# Patient Record
Sex: FEMALE
[De-identification: names, ages, dates, MRNs, and addresses within clinical notes are randomized per-mention and may not be internally consistent; named-entity substitution may affect disease eponyms.]

---

## 2017-07-15 ENCOUNTER — RX ONLY (OUTPATIENT)
Age: 52
Setting detail: RX ONLY
End: 2017-07-15

## 2018-05-18 RX ORDER — NITROGLYCERIN 0.4 MG/1
0.4 TABLET SUBLINGUAL
COMMUNITY
End: 2018-11-21 | Stop reason: SDUPTHER

## 2018-05-18 RX ORDER — ATORVASTATIN CALCIUM 80 MG/1
80 TABLET, FILM COATED ORAL DAILY
COMMUNITY
Start: 2018-01-15 | End: 2018-05-21 | Stop reason: SDUPTHER

## 2018-05-18 RX ORDER — LISINOPRIL 10 MG/1
10 TABLET ORAL DAILY
COMMUNITY
Start: 2018-01-15 | End: 2018-05-21 | Stop reason: SDUPTHER

## 2018-05-18 RX ORDER — METOPROLOL SUCCINATE 100 MG/1
100 TABLET, EXTENDED RELEASE ORAL DAILY
COMMUNITY
Start: 2018-01-15 | End: 2018-05-21 | Stop reason: SDUPTHER

## 2018-05-18 RX ORDER — METFORMIN HYDROCHLORIDE 750 MG/1
750 TABLET, EXTENDED RELEASE ORAL DAILY
COMMUNITY
End: 2019-05-14

## 2018-05-18 RX ORDER — ASPIRIN 81 MG/1
81 TABLET ORAL DAILY
COMMUNITY
Start: 2017-08-23

## 2018-05-21 ENCOUNTER — OFFICE VISIT (OUTPATIENT)
Dept: CARDIOLOGY | Facility: CLINIC | Age: 53
End: 2018-05-21
Attending: INTERNAL MEDICINE
Payer: COMMERCIAL

## 2018-05-21 VITALS
HEIGHT: 64 IN | BODY MASS INDEX: 39.98 KG/M2 | DIASTOLIC BLOOD PRESSURE: 70 MMHG | WEIGHT: 234.2 LBS | SYSTOLIC BLOOD PRESSURE: 110 MMHG | HEART RATE: 83 BPM

## 2018-05-21 DIAGNOSIS — E66.9 OBESITY WITH SERIOUS COMORBIDITY, UNSPECIFIED CLASSIFICATION, UNSPECIFIED OBESITY TYPE: ICD-10-CM

## 2018-05-21 DIAGNOSIS — R94.31 ABNORMAL EKG: ICD-10-CM

## 2018-05-21 DIAGNOSIS — I73.9 CLAUDICATION (CMS/HCC): ICD-10-CM

## 2018-05-21 DIAGNOSIS — E78.5 ELEVATED LIPIDS: ICD-10-CM

## 2018-05-21 DIAGNOSIS — Z72.0 TOBACCO USE: ICD-10-CM

## 2018-05-21 DIAGNOSIS — I21.4 NON-ST ELEVATION (NSTEMI) MYOCARDIAL INFARCTION (CMS/HCC): ICD-10-CM

## 2018-05-21 DIAGNOSIS — I73.9 CLAUDICATION IN PERIPHERAL VASCULAR DISEASE (CMS/HCC): Primary | ICD-10-CM

## 2018-05-21 DIAGNOSIS — I73.9 PERIPHERAL VASCULAR DISEASE (CMS/HCC): ICD-10-CM

## 2018-05-21 PROBLEM — I10 ESSENTIAL HYPERTENSION: Status: ACTIVE | Noted: 2018-05-21

## 2018-05-21 PROCEDURE — 93000 ELECTROCARDIOGRAM COMPLETE: CPT | Performed by: INTERNAL MEDICINE

## 2018-05-21 PROCEDURE — 99214 OFFICE O/P EST MOD 30 MIN: CPT | Performed by: INTERNAL MEDICINE

## 2018-05-21 RX ORDER — METOPROLOL SUCCINATE 100 MG/1
100 TABLET, EXTENDED RELEASE ORAL DAILY
Qty: 30 TABLET | Refills: 5 | Status: SHIPPED | OUTPATIENT
Start: 2018-05-21 | End: 2019-04-24 | Stop reason: SDUPTHER

## 2018-05-21 RX ORDER — LISINOPRIL 10 MG/1
10 TABLET ORAL DAILY
Qty: 30 TABLET | Refills: 5 | Status: SHIPPED | OUTPATIENT
Start: 2018-05-21 | End: 2019-04-24 | Stop reason: SDUPTHER

## 2018-05-21 RX ORDER — ATORVASTATIN CALCIUM 80 MG/1
80 TABLET, FILM COATED ORAL DAILY
Qty: 30 TABLET | Refills: 5 | Status: SHIPPED | OUTPATIENT
Start: 2018-05-21 | End: 2019-04-24 | Stop reason: SDUPTHER

## 2018-05-21 ASSESSMENT — ENCOUNTER SYMPTOMS
WHEEZING: 0
HEMOPTYSIS: 0
MEMORY LOSS: 0
CLAUDICATION: 0
SHORTNESS OF BREATH: 0
BRUISES/BLEEDS EASILY: 0
WEIGHT GAIN: 1
SYNCOPE: 0
IRREGULAR HEARTBEAT: 0
NEAR-SYNCOPE: 0
FLANK PAIN: 0
EXCESSIVE DAYTIME SLEEPINESS: 0
DYSPNEA ON EXERTION: 0
LIGHT-HEADEDNESS: 0
HEMATEMESIS: 0
SNORING: 0
UNUSUAL HAIR DISTRIBUTION: 0
FOCAL WEAKNESS: 0
FEVER: 0
JAUNDICE: 0
COLOR CHANGE: 0
BACK PAIN: 0
FALLS: 0
HEADACHES: 0
PALPITATIONS: 0
CHILLS: 0
DIZZINESS: 0
PND: 0
SPUTUM PRODUCTION: 0
ORTHOPNEA: 0
NERVOUS/ANXIOUS: 0
HEMATURIA: 0
MYALGIAS: 0

## 2018-05-21 NOTE — LETTER
May 21, 2018     Semaj Reina  [address] [address]    Patient: Mirela Lopez   YOB: 1965   Date of Visit: 5/21/2018       Dear Dr. Reina:    Thank you for referring Mirela Lopez to me for evaluation. Below are my notes for this consultation.    If you have questions, please do not hesitate to call me. I look forward to following your patient along with you.         Sincerely,        Zara Mccloud MD        CC: MD Zara Naik MD  5/21/2018  1:37 PM  Signed  HPI   Mirela comes to the office today for follow-up of her coronary artery disease status post non-ST segment elevation myocardial infarction while on vacation in [place] in August 2017.  She has a history of diabetes, hyperlipidemia and obesity.  During her cardiac catheterization, she was also found to have a right iliac occlusion, but in follow-up visit she describes symptoms of iliac claudication.  She had an ultrasound performed that showed a small amount of flow and the subsequent CTA showed short occlusion with reconstitution.  Mirela was referred to vascular surgery and underwent successful percutaneous intervention of the right iliac with Dr. Sandip Ugalde at [facility].  She denies any claudication symptoms.  From a cardiac perspective, she feels well.  She has not had any recurrent symptoms indicative of her angina.  She is exercising several days a week and denies any chest discomfort or unusual shortness of breath with exercise.  She has not smoked since her heart attack.  We reviewed all of her noninvasive studies up to this point.  In February of this year she had PVRs done at [facility] that were abnormal on the left side, although a previous angiogram did not show any significant disease on this side.  She denies any left-sided claudication symptoms.     Medical History:  Past Medical History:   Diagnosis Date   • Coronary artery disease    • GERD (gastroesophageal reflux  disease)    • Hyperlipidemia    • Impaired fasting glucose    • Myocardial infarction     NSTEMI with stent left circumflex   • Obesity    • Peripheral vascular disease (CMS/HCC) (HCC) 2017    Right iliac claudication.  Aortogram demonstrated near occlusion of the right common iliac.  Successful right iliac stent 2017     Surgical History:  Past Surgical History:   Procedure Laterality Date   • CARDIAC CATHETERIZATION      LAD tortuous, no significant disease.  Dominant RCA-patent.  90% proximal cx, 95% mid cx and distal thrombus   • CARPAL TUNNEL RELEASE     •  SECTION     • CORONARY STENT PLACEMENT      NERI ×2 left circumflex-performed in [place]   • ILIAC ARTERY STENT  10/2017    Dr. Ugalde     Social History: reports that she quit smoking about 9 months ago. She has never used smokeless tobacco. She reports that she drinks alcohol.    Family History:  Family History   Problem Relation Age of Onset   • Lung cancer Mother    • Heart disease Father    • Hyperlipidemia Father    • Diabetes type II Maternal Grandmother         Allergies:Adhesive and Adhesive tape-silicones    Current Medications:  Current Outpatient Prescriptions:   •  aspirin 81 mg enteric coated tablet, Take 81 mg by mouth daily., Disp: , Rfl:   •  atorvastatin (LIPITOR) 80 mg tablet, Take 1 tablet (80 mg total) by mouth daily., Disp: 30 tablet, Rfl: 5  •  ferrous sulfate 325 mg (65 mg iron) tablet, Take 1 tablet by mouth 3 (three) times a day., Disp: , Rfl: 6  •  lisinopril (PRINIVIL) 10 mg tablet, Take 1 tablet (10 mg total) by mouth daily., Disp: 30 tablet, Rfl: 5  •  metFORMIN XR (GLUCOPHAGE-XR) 750 mg 24 hr tablet, Take 750 mg by mouth daily. With evening meal., Disp: , Rfl:   •  metoprolol succinate XL (TOPROL-XL) 100 mg 24 hr tablet, Take 1 tablet (100 mg total) by mouth daily., Disp: 30 tablet, Rfl: 5  •  nitroglycerin (NITROSTAT) 0.4 mg SL tablet, Place 0.4 mg under the tongue. place 1 tablet by sublingual  "route at the 1st sign of attack; may repeat every 5 min until relief; if pain persists after 3 tablets in 15 min, prompt medical attention is recommended, Disp: , Rfl:   •  ticagrelor (BRILINTA) 90 mg tablet, Take 1 tablet (90 mg total) by mouth 2 (two) times a day., Disp: 60 tablet, Rfl: 5     Review of Systems   Constitution: Positive for weight gain. Negative for chills and fever.   HENT: Negative for nosebleeds.    Eyes: Negative for visual disturbance.   Cardiovascular: Negative for chest pain, claudication, dyspnea on exertion, irregular heartbeat, leg swelling, near-syncope, orthopnea, palpitations, paroxysmal nocturnal dyspnea and syncope.   Respiratory: Negative for hemoptysis, shortness of breath, snoring, sputum production and wheezing.    Endocrine: Negative for heat intolerance.   Hematologic/Lymphatic: Negative for bleeding problem. Does not bruise/bleed easily.   Skin: Negative for color change and unusual hair distribution.   Musculoskeletal: Negative for back pain, falls and myalgias.   Gastrointestinal: Negative for hematemesis, jaundice and melena.   Genitourinary: Negative for flank pain and hematuria.   Neurological: Negative for excessive daytime sleepiness, dizziness, focal weakness, headaches and light-headedness.   Psychiatric/Behavioral: Negative for memory loss. The patient is not nervous/anxious.    Allergic/Immunologic: Negative for environmental allergies.     Vitals:    05/21/18 1114   BP: 110/70   BP Location: Right upper arm   Patient Position: Sitting   Pulse: 83   Weight: 106 kg (234 lb 3.2 oz)   Height: 1.626 m (5' 4\")     BP Readings from Last 3 Encounters:   05/21/18 110/70     Wt Readings from Last 3 Encounters:   05/21/18 106 kg (234 lb 3.2 oz)     Physical Exam   Constitutional: She is oriented to person, place, and time. She appears well-developed. She is cooperative.   Obese white female in no apparent distress   HENT:   Head: Normocephalic and atraumatic.   Mouth/Throat: " Oropharynx is clear and moist and mucous membranes are normal.   Eyes: Conjunctivae and EOM are normal. No scleral icterus.   Neck: Trachea normal and normal range of motion. Neck supple. No hepatojugular reflux and no JVD present. Carotid bruit is not present. No thyromegaly present.   Cardiovascular: Normal rate, regular rhythm, S1 normal, S2 normal, normal heart sounds and intact distal pulses.  Exam reveals no gallop, no S3, no S4 and no friction rub.    Pulses:       Radial pulses are 2+ on the right side, and 2+ on the left side.        Posterior tibial pulses are 1+ on the right side, and 1+ on the left side.   There is a 1/6 systolic murmur at the left sternal border.   Pulmonary/Chest: Effort normal and breath sounds normal. She has no wheezes. She has no rhonchi. She has no rales.   Abdominal: Soft. Bowel sounds are normal. She exhibits no abdominal bruit. There is no tenderness.   Obese abdomen   Musculoskeletal: Normal range of motion. She exhibits no tenderness or deformity.   Neurological: She is alert and oriented to person, place, and time. No cranial nerve deficit.   Skin: Skin is warm, dry and intact. No rash noted. No cyanosis. Nails show no clubbing.   Psychiatric: She has a normal mood and affect. Her speech is normal and behavior is normal.          Lab Results   Component Value Date    WBC 8.59 10/27/2017    HGB 8.2 (L) 10/27/2017     10/27/2017     10/27/2017    K 3.7 10/27/2017    CREATININE 0.6 10/27/2017    INR 1.1 10/27/2017       Imaging:  CATH LAB:  Cath (LAD tortuous, no significant disease. RCA dominant and patent. Left circumflex with 90% proximal lesion, 95% mid lesion and distal thrombus. Successful NERI ×2 left circumflex.) - 8/13/2017    ECHO/MUGA:  Echo (EF 0.65 (65%), Top normal LV size with overall preserved function. No significant pulmonary hypertension by Doppler. Technically difficult study.) - 9/2017    VASCULAR:  Aortogram w/Runoff (Occluded right common  iliac at origin with collaterals. Patent left common iliac and common femoral.) - 8/13/2017    Abd Ao Duplex (Elevated velocities right common iliac with monophasic flow.) - 9/2017    Periph Intervention (Successful right iliac stent.) - 10/2017    ECG: Sinus Rhythm, RSR(V1) -nondiagnostic.  Mild NSST-T abnl       Diagnoses and all orders for this visit:    Claudication in peripheral vascular disease (CMS/HCC) ([facility])  We reviewed her prior noninvasive studies, angiogram and PVRs.  She has no claudication symptoms in her left leg.  Dr. Del Rio will be following her closely.  If the patient does start to notice any left leg symptoms, she should let us know right away.  In the meantime, she will continue with antiplatelet therapy and statin therapy.  LDL goal less than 70 mg/dL.  She just had lab work done and I am awaiting copies of this.    Non-ST elevation (NSTEMI) myocardial infarction (CMS/HCC) ([facility])  She remains on aspirin 81 mg daily and Brilinta 90 mg twice daily.  At the end of August, she may discontinue her Brilinta and resume single antiplatelet therapy.  Continue to abstain from tobacco.  LDL goal less than 70 mg/dL.  Unfortunately, she has gained weight since her last visit and needs to continue efforts with diet and weight loss.    Tobacco use  Continue to abstain from tobacco.  This was reinforced with patient today.    Elevated lipids  Continue atorvastatin with LDL goal less than 70 mg/dL.  Awaiting updated blood work.    Abnormal EKG  EKG is abnormal but unchanged.  She denies any anginal symptoms.  She states that her exercise capacity is good with no changes recently.  Continue regular physical activity.  Continue aspirin 81 mg daily, Toprol- mg once daily and lisinopril.    Obesity with serious comorbidity, unspecified classification, unspecified obesity type  Continue with weight loss and regular physical activity.  BMI is 40.    Essential hypertension  Controlled on lisinopril 10 mg daily  and Toprol- mg daily.  Continue efforts with weight loss, exercise and dietary discretion.    Other orders-medications renewed today  -     atorvastatin (LIPITOR) 80 mg tablet; Take 1 tablet (80 mg total) by mouth daily.  -     lisinopril (PRINIVIL) 10 mg tablet; Take 1 tablet (10 mg total) by mouth daily.  -     metoprolol succinate XL (TOPROL-XL) 100 mg 24 hr tablet; Take 1 tablet (100 mg total) by mouth daily.  -     ticagrelor (BRILINTA) 90 mg tablet; Take 1 tablet (90 mg total) by mouth 2 (two) times a day.    Return in about 6 months (around 11/21/2018).  She is scheduled for follow-up with Dr. Ugalde over the summer.  I gave her written and verbal instructions that she may discontinue her Brilinta at the end of August.  If she has any issues that arise over the next few months, she will contact the office.  Thank you for allowing me to participate in the care of this patient.  I hope this information is helpful.    Zara Mccloud MD [facility]  5/21/2018  1:22 PM

## 2018-05-21 NOTE — PROGRESS NOTES
EVELIN Dietz comes to the office today for follow-up of her coronary artery disease status post non-ST segment elevation myocardial infarction while on vacation in [place] in August 2017.  She has a history of diabetes, hyperlipidemia and obesity.  During her cardiac catheterization, she was also found to have a right iliac occlusion, but in follow-up visit she describes symptoms of iliac claudication.  She had an ultrasound performed that showed a small amount of flow and the subsequent CTA showed short occlusion with reconstitution.  Mirela was referred to vascular surgery and underwent successful percutaneous intervention of the right iliac with Dr. Sandip Ugalde at [facility].  She denies any claudication symptoms.  From a cardiac perspective, she feels well.  She has not had any recurrent symptoms indicative of her angina.  She is exercising several days a week and denies any chest discomfort or unusual shortness of breath with exercise.  She has not smoked since her heart attack.  We reviewed all of her noninvasive studies up to this point.  In February of this year she had PVRs done at [facility] that were abnormal on the left side, although a previous angiogram did not show any significant disease on this side.  She denies any left-sided claudication symptoms.     Medical History:  Past Medical History:   Diagnosis Date   • Coronary artery disease    • GERD (gastroesophageal reflux disease)    • Hyperlipidemia    • Impaired fasting glucose    • Myocardial infarction 2017    NSTEMI with stent left circumflex   • Obesity    • Peripheral vascular disease (CMS/HCC) ([facility]) 2017    Right iliac claudication.  Aortogram demonstrated near occlusion of the right common iliac.  Successful right iliac stent October 2017     Surgical History:  Past Surgical History:   Procedure Laterality Date   • CARDIAC CATHETERIZATION  2017    LAD tortuous, no significant disease.  Dominant RCA-patent.  90% proximal cx, 95% mid cx and  distal thrombus   • CARPAL TUNNEL RELEASE     •  SECTION     • CORONARY STENT PLACEMENT  2017    NERI ×2 left circumflex-performed in [place]   • ILIAC ARTERY STENT  10/2017    Dr. Ugalde     Social History: reports that she quit smoking about 9 months ago. She has never used smokeless tobacco. She reports that she drinks alcohol.    Family History:  Family History   Problem Relation Age of Onset   • Lung cancer Mother    • Heart disease Father    • Hyperlipidemia Father    • Diabetes type II Maternal Grandmother         Allergies:Adhesive and Adhesive tape-silicones    Current Medications:  Current Outpatient Prescriptions:   •  aspirin 81 mg enteric coated tablet, Take 81 mg by mouth daily., Disp: , Rfl:   •  atorvastatin (LIPITOR) 80 mg tablet, Take 1 tablet (80 mg total) by mouth daily., Disp: 30 tablet, Rfl: 5  •  ferrous sulfate 325 mg (65 mg iron) tablet, Take 1 tablet by mouth 3 (three) times a day., Disp: , Rfl: 6  •  lisinopril (PRINIVIL) 10 mg tablet, Take 1 tablet (10 mg total) by mouth daily., Disp: 30 tablet, Rfl: 5  •  metFORMIN XR (GLUCOPHAGE-XR) 750 mg 24 hr tablet, Take 750 mg by mouth daily. With evening meal., Disp: , Rfl:   •  metoprolol succinate XL (TOPROL-XL) 100 mg 24 hr tablet, Take 1 tablet (100 mg total) by mouth daily., Disp: 30 tablet, Rfl: 5  •  nitroglycerin (NITROSTAT) 0.4 mg SL tablet, Place 0.4 mg under the tongue. place 1 tablet by sublingual route at the 1st sign of attack; may repeat every 5 min until relief; if pain persists after 3 tablets in 15 min, prompt medical attention is recommended, Disp: , Rfl:   •  ticagrelor (BRILINTA) 90 mg tablet, Take 1 tablet (90 mg total) by mouth 2 (two) times a day., Disp: 60 tablet, Rfl: 5     Review of Systems   Constitution: Positive for weight gain. Negative for chills and fever.   HENT: Negative for nosebleeds.    Eyes: Negative for visual disturbance.   Cardiovascular: Negative for chest pain, claudication, dyspnea on  "exertion, irregular heartbeat, leg swelling, near-syncope, orthopnea, palpitations, paroxysmal nocturnal dyspnea and syncope.   Respiratory: Negative for hemoptysis, shortness of breath, snoring, sputum production and wheezing.    Endocrine: Negative for heat intolerance.   Hematologic/Lymphatic: Negative for bleeding problem. Does not bruise/bleed easily.   Skin: Negative for color change and unusual hair distribution.   Musculoskeletal: Negative for back pain, falls and myalgias.   Gastrointestinal: Negative for hematemesis, jaundice and melena.   Genitourinary: Negative for flank pain and hematuria.   Neurological: Negative for excessive daytime sleepiness, dizziness, focal weakness, headaches and light-headedness.   Psychiatric/Behavioral: Negative for memory loss. The patient is not nervous/anxious.    Allergic/Immunologic: Negative for environmental allergies.     Vitals:    05/21/18 1114   BP: 110/70   BP Location: Right upper arm   Patient Position: Sitting   Pulse: 83   Weight: 106 kg (234 lb 3.2 oz)   Height: 1.626 m (5' 4\")     BP Readings from Last 3 Encounters:   05/21/18 110/70     Wt Readings from Last 3 Encounters:   05/21/18 106 kg (234 lb 3.2 oz)     Physical Exam   Constitutional: She is oriented to person, place, and time. She appears well-developed. She is cooperative.   Obese white female in no apparent distress   HENT:   Head: Normocephalic and atraumatic.   Mouth/Throat: Oropharynx is clear and moist and mucous membranes are normal.   Eyes: Conjunctivae and EOM are normal. No scleral icterus.   Neck: Trachea normal and normal range of motion. Neck supple. No hepatojugular reflux and no JVD present. Carotid bruit is not present. No thyromegaly present.   Cardiovascular: Normal rate, regular rhythm, S1 normal, S2 normal, normal heart sounds and intact distal pulses.  Exam reveals no gallop, no S3, no S4 and no friction rub.    Pulses:       Radial pulses are 2+ on the right side, and 2+ on the " left side.        Posterior tibial pulses are 1+ on the right side, and 1+ on the left side.   There is a 1/6 systolic murmur at the left sternal border.   Pulmonary/Chest: Effort normal and breath sounds normal. She has no wheezes. She has no rhonchi. She has no rales.   Abdominal: Soft. Bowel sounds are normal. She exhibits no abdominal bruit. There is no tenderness.   Obese abdomen   Musculoskeletal: Normal range of motion. She exhibits no tenderness or deformity.   Neurological: She is alert and oriented to person, place, and time. No cranial nerve deficit.   Skin: Skin is warm, dry and intact. No rash noted. No cyanosis. Nails show no clubbing.   Psychiatric: She has a normal mood and affect. Her speech is normal and behavior is normal.          Lab Results   Component Value Date    WBC 8.59 10/27/2017    HGB 8.2 (L) 10/27/2017     10/27/2017     10/27/2017    K 3.7 10/27/2017    CREATININE 0.6 10/27/2017    INR 1.1 10/27/2017       Imaging:  CATH LAB:  Cath (LAD tortuous, no significant disease. RCA dominant and patent. Left circumflex with 90% proximal lesion, 95% mid lesion and distal thrombus. Successful NERI ×2 left circumflex.) - 8/13/2017    ECHO/MUGA:  Echo (EF 0.65 (65%), Top normal LV size with overall preserved function. No significant pulmonary hypertension by Doppler. Technically difficult study.) - 9/2017    VASCULAR:  Aortogram w/Runoff (Occluded right common iliac at origin with collaterals. Patent left common iliac and common femoral.) - 8/13/2017    Abd Ao Duplex (Elevated velocities right common iliac with monophasic flow.) - 9/2017    Periph Intervention (Successful right iliac stent.) - 10/2017    ECG: Sinus Rhythm, RSR(V1) -nondiagnostic.  Mild NSST-T abnl       Diagnoses and all orders for this visit:    Claudication in peripheral vascular disease (CMS/HCC) (HCC)  We reviewed her prior noninvasive studies, angiogram and PVRs.  She has no claudication symptoms in her left leg.   Dr. Del Rio will be following her closely.  If the patient does start to notice any left leg symptoms, she should let us know right away.  In the meantime, she will continue with antiplatelet therapy and statin therapy.  LDL goal less than 70 mg/dL.  She just had lab work done and I am awaiting copies of this.    Non-ST elevation (NSTEMI) myocardial infarction (CMS/HCC) (HCC)  She remains on aspirin 81 mg daily and Brilinta 90 mg twice daily.  At the end of August, she may discontinue her Brilinta and resume single antiplatelet therapy.  Continue to abstain from tobacco.  LDL goal less than 70 mg/dL.  Unfortunately, she has gained weight since her last visit and needs to continue efforts with diet and weight loss.    Tobacco use  Continue to abstain from tobacco.  This was reinforced with patient today.    Elevated lipids  Continue atorvastatin with LDL goal less than 70 mg/dL.  Awaiting updated blood work.    Abnormal EKG  EKG is abnormal but unchanged.  She denies any anginal symptoms.  She states that her exercise capacity is good with no changes recently.  Continue regular physical activity.  Continue aspirin 81 mg daily, Toprol- mg once daily and lisinopril.    Obesity with serious comorbidity, unspecified classification, unspecified obesity type  Continue with weight loss and regular physical activity.  BMI is 40.    Essential hypertension  Controlled on lisinopril 10 mg daily and Toprol- mg daily.  Continue efforts with weight loss, exercise and dietary discretion.    Other orders-medications renewed today  -     atorvastatin (LIPITOR) 80 mg tablet; Take 1 tablet (80 mg total) by mouth daily.  -     lisinopril (PRINIVIL) 10 mg tablet; Take 1 tablet (10 mg total) by mouth daily.  -     metoprolol succinate XL (TOPROL-XL) 100 mg 24 hr tablet; Take 1 tablet (100 mg total) by mouth daily.  -     ticagrelor (BRILINTA) 90 mg tablet; Take 1 tablet (90 mg total) by mouth 2 (two) times a day.    Return in  about 6 months (around 11/21/2018).  She is scheduled for follow-up with Dr. Ugalde over the summer.  I gave her written and verbal instructions that she may discontinue her Brilinta at the end of August.  If she has any issues that arise over the next few months, she will contact the office.  Thank you for allowing me to participate in the care of this patient.  I hope this information is helpful.    Zara Mccloud MD [facility]  5/21/2018  1:22 PM

## 2018-07-31 DIAGNOSIS — I73.9 PAD (PERIPHERAL ARTERY DISEASE) (CMS/HCC): Primary | ICD-10-CM

## 2018-11-21 ENCOUNTER — OFFICE VISIT (OUTPATIENT)
Dept: CARDIOLOGY | Facility: CLINIC | Age: 53
End: 2018-11-21
Payer: COMMERCIAL

## 2018-11-21 VITALS
DIASTOLIC BLOOD PRESSURE: 66 MMHG | RESPIRATION RATE: 16 BRPM | HEART RATE: 80 BPM | HEIGHT: 64 IN | WEIGHT: 238.6 LBS | BODY MASS INDEX: 40.74 KG/M2 | SYSTOLIC BLOOD PRESSURE: 110 MMHG

## 2018-11-21 DIAGNOSIS — I10 ESSENTIAL HYPERTENSION: Primary | ICD-10-CM

## 2018-11-21 DIAGNOSIS — I21.4 NON-ST ELEVATION (NSTEMI) MYOCARDIAL INFARCTION (CMS/HCC): ICD-10-CM

## 2018-11-21 DIAGNOSIS — R94.31 ABNORMAL EKG: ICD-10-CM

## 2018-11-21 DIAGNOSIS — I73.9 CLAUDICATION (CMS/HCC): ICD-10-CM

## 2018-11-21 DIAGNOSIS — Z72.0 TOBACCO USE: ICD-10-CM

## 2018-11-21 DIAGNOSIS — E78.5 ELEVATED LIPIDS: ICD-10-CM

## 2018-11-21 PROCEDURE — 93000 ELECTROCARDIOGRAM COMPLETE: CPT | Performed by: INTERNAL MEDICINE

## 2018-11-21 PROCEDURE — 99214 OFFICE O/P EST MOD 30 MIN: CPT | Performed by: INTERNAL MEDICINE

## 2018-11-21 RX ORDER — NITROGLYCERIN 0.4 MG/1
0.4 TABLET SUBLINGUAL EVERY 5 MIN PRN
Qty: 25 TABLET | Refills: 3 | Status: SHIPPED | OUTPATIENT
Start: 2018-11-21 | End: 2023-11-13 | Stop reason: SDUPTHER

## 2018-11-21 ASSESSMENT — ENCOUNTER SYMPTOMS
JAUNDICE: 0
SPUTUM PRODUCTION: 0
MEMORY LOSS: 0
FALLS: 0
SHORTNESS OF BREATH: 0
WHEEZING: 0
FEVER: 0
BRUISES/BLEEDS EASILY: 0
HEMATEMESIS: 0
FOCAL WEAKNESS: 0
FLANK PAIN: 0
NEAR-SYNCOPE: 0
NERVOUS/ANXIOUS: 0
ORTHOPNEA: 0
MYALGIAS: 0
PALPITATIONS: 0
HEMATURIA: 0
PND: 0
HEADACHES: 0
EXCESSIVE DAYTIME SLEEPINESS: 0
SNORING: 0
COLOR CHANGE: 0
BACK PAIN: 0
DYSPNEA ON EXERTION: 0
UNUSUAL HAIR DISTRIBUTION: 0
DIZZINESS: 0
LIGHT-HEADEDNESS: 0
IRREGULAR HEARTBEAT: 0
SYNCOPE: 0
CHILLS: 0
CLAUDICATION: 0
WEIGHT GAIN: 1
HEMOPTYSIS: 0

## 2018-11-21 NOTE — LETTER
November 21, 2018     Semaj Reina  [address] [address]    Patient: Mirlea Lopez   YOB: 1965   Date of Visit: 11/21/2018       Dear Dr. Reina:    Thank you for referring Mirela Lopez to me for evaluation. Below are my notes for this consultation.    If you have questions, please do not hesitate to call me. I look forward to following your patient along with you.         Sincerely,        Zara Mccloud MD        CC: MD Oneyda Naik Erin, MD  11/21/2018  9:55 AM  Signed  Patient ID: Mirela Lopez is a 53 y.o. female. 1965  HPI   Mirela comes to the office to day for follow-up regarding her coronary artery disease status post non-ST segment elevation myocardial infarction while in [place] in August 2017.  Cardiac catheterization at that time showed a 95% mid and distal circumflex with thrombus.  There is also a 90% proximal circumflex lesion and she underwent successful NERI placement x2 with that time.  She has completed 1 year of dual antiplatelet therapy with aspirin and Brilinta and is now on aspirin alone.  She was also found to have right iliac claudication and underwent successful right iliac stent in October 2017.  She says she just received notification that she needs to follow-up with Dr. Ugalde.    I am happy to report that she is not smoking.  She has been exercising 3 times a week, but not as much recently because of a vacation and the fact that she was working a second job.  Mirela denies any chest discomfort or anginal symptoms reminiscent of her original event.  She denies any claudication.  She is compliant with her medications.  She has not used any sublingual nitroglycerin.       Past Medical History:   Diagnosis Date   • Coronary artery disease    • GERD (gastroesophageal reflux disease)    • Hyperlipidemia    • Hypertension    • Impaired fasting glucose    • Myocardial infarction (CMS/[facility]) (HCC) 2017    NSTEMI with stent  left circumflex   • Obesity    • Peripheral vascular disease (CMS/HCC) ([facility]) 2017    Right iliac claudication.  Aortogram demonstrated near occlusion of the right common iliac.  Successful right iliac stent 2017     Past Surgical History:   Procedure Laterality Date   • CARDIAC CATHETERIZATION      LAD tortuous, no significant disease.  Dominant RCA-patent.  90% proximal cx, 95% mid cx and distal thrombus   • CARPAL TUNNEL RELEASE     •  SECTION     • CORONARY STENT PLACEMENT      NERI ×2 left circumflex-performed in [place]   • ILIAC ARTERY STENT  10/2017    Dr. Ugalde     Social History   Substance Use Topics   • Smoking status: Former Smoker     Quit date: 2017   • Smokeless tobacco: Never Used   • Alcohol use Yes      Comment: Occasional social alcohol     Family History   Problem Relation Age of Onset   • Lung cancer Mother    • Heart disease Father    • Hyperlipidemia Father    • Diabetes type II Maternal Grandmother         Allergies:Adhesive and Adhesive tape-silicones    Current Outpatient Prescriptions:   •  aspirin 81 mg enteric coated tablet, Take 81 mg by mouth daily.  •  atorvastatin (LIPITOR) 80 mg tablet, Take 1 tablet (80 mg total) by mouth daily.,   •  ferrous sulfate 325 mg (65 mg iron) tablet, Take 1 tablet by mouth 3 (three) times a day.,   •  lisinopril (PRINIVIL) 10 mg tablet, Take 1 tablet (10 mg total) by mouth daily., Disp:   •  metFORMIN XR (GLUCOPHAGE-XR) 750 mg 24 hr tablet, Take 750 mg by mouth daily. With evening meal.  •  metoprolol succinate XL (TOPROL-XL) 100 mg 24 hr tablet, Take 1 tablet (100 mg total) by mouth daily.,  •  nitroglycerin (NITROSTAT) 0.4 mg SL tablet, Place 0.4 mg under the tongue. place 1 tablet by sublingual route at the 1st sign of attack; may repeat every 5 min until relief; if pain persists after 3 tablets in 15 min, prompt medical attention is recommended,      Review of Systems   Constitution: Positive for weight gain. Negative  "for chills and fever.   HENT: Negative for nosebleeds.    Eyes: Negative for visual disturbance.   Cardiovascular: Negative for chest pain, claudication, dyspnea on exertion, irregular heartbeat, leg swelling, near-syncope, orthopnea, palpitations, paroxysmal nocturnal dyspnea and syncope.   Respiratory: Negative for hemoptysis, shortness of breath, snoring, sputum production and wheezing.    Endocrine: Negative for heat intolerance.   Hematologic/Lymphatic: Negative for bleeding problem. Does not bruise/bleed easily.   Skin: Negative for color change and unusual hair distribution.   Musculoskeletal: Negative for back pain, falls and myalgias.   Gastrointestinal: Negative for hematemesis, jaundice and melena.   Genitourinary: Negative for flank pain and hematuria.   Neurological: Negative for excessive daytime sleepiness, dizziness, focal weakness, headaches and light-headedness.   Psychiatric/Behavioral: Negative for memory loss. The patient is not nervous/anxious.    Allergic/Immunologic: Negative for environmental allergies.     Vitals:    11/21/18 0926   BP: 110/66 millimeters mercury, repeat blood pressure 126/76 mmHg   BP Location: Left upper arm   Patient Position: Sitting   Pulse: 80   Resp: 16   Weight: 108 kg (238 lb 9.6 oz)   Height: 1.626 m (5' 4\")     BP Readings from Last 3 Encounters:   11/21/18 110/66   05/21/18 110/70     Wt Readings from Last 3 Encounters:   11/21/18 108 kg (238 lb 9.6 oz)   05/21/18 106 kg (234 lb 3.2 oz)     Physical Exam   Constitutional: She is oriented to person, place, and time. She appears well-developed. She is cooperative.   Obese white female in no apparent distress   HENT:   Head: Normocephalic and atraumatic.   Mouth/Throat: Oropharynx is clear and moist and mucous membranes are normal.   Eyes: Conjunctivae and EOM are normal. No scleral icterus.   Neck: Trachea normal and normal range of motion. Neck supple. No hepatojugular reflux and no JVD present. Carotid bruit is " not present. No thyromegaly present.   Cardiovascular: Normal rate, regular rhythm, S1 normal, S2 normal and normal heart sounds.  Exam reveals decreased pulses. Exam reveals no gallop, no S3, no S4 and no friction rub.    Pulses:       Radial pulses are 2+ on the right side, and 2+ on the left side.        Posterior tibial pulses are 1+ on the right side, and 1+ on the left side.   There is a 1/6 systolic murmur at the left sternal border.   Pulmonary/Chest: Effort normal and breath sounds normal. She has no wheezes. She has no rhonchi. She has no rales.   Abdominal: Soft. Bowel sounds are normal. She exhibits no abdominal bruit. There is no tenderness.   Obese abdomen   Musculoskeletal: Normal range of motion. She exhibits no tenderness or deformity.   Neurological: She is alert and oriented to person, place, and time. No cranial nerve deficit.   Skin: Skin is warm, dry and intact. No rash noted. No cyanosis. Nails show no clubbing.   Psychiatric: She has a normal mood and affect. Her speech is normal and behavior is normal.          Lab Results   Component Value Date    WBC 8.59 10/27/2017    HGB 8.2 (L) 10/27/2017    HCT 24.0 (L) 10/27/2017    MCV 89.2 10/27/2017     10/27/2017     Lab Results   Component Value Date    GLUCOSE 157 (H) 10/27/2017     10/27/2017    K 3.7 10/27/2017    CO2 23 10/27/2017     10/27/2017    BUN 6 (L) 10/27/2017    CREATININE 0.6 10/27/2017     Imaging:  CATH LAB:  Cath (LAD tortuous, no significant disease. RCA dominant and patent. Left circumflex with 90% proximal lesion, 95% mid lesion and distal thrombus. Successful NERI ×2 left circumflex.) - 8/13/2017     ECHO/MUGA:  Echo (EF 0.65 (65%), Top normal LV size with overall preserved function. No significant pulmonary hypertension by Doppler. Technically difficult study.) - 9/2017     VASCULAR:  Aortogram w/Runoff (Occluded right common iliac at origin with collaterals. Patent left common iliac and common femoral.) -  "2017     Abd Ao Duplex (Elevated velocities right common iliac with monophasic flow.) - 2017     Periph Intervention (Successful right iliac stent.) - 10/2017    EK. Essential hypertension  Mirela has well-controlled blood pressure at the present time on lisinopril 10 mg daily and Toprol- mg daily. Discussed sodium restriction, maintaining ideal body weight and regular exercise program as physiologic means to help achieve blood pressure control.     2. Abnormal EKG  EKG is abnormal but unchanged.  Continue aggressive medical therapy.    3. Claudication (CMS/HCC) (HCC)  Mirela states that her claudication is under good control and really not an issue right now.The patient was encouraged regarding regular physical activity.  We discussed the importance of seeking immediate medical attention with any changes in color, sensation, nonhealing ulcers or wounds or significant change in walking distance.  LDL goal less than 70 mg/dL. Continue antiplatelet therapy.  She will be following up with Dr. Ugalde is recommended.    4. Elevated lipids  Recommend intensive lipid-lowering with LDL goal less than 70 mg/dL. Appropriate medical therapy discussed. The patient is asked to make an attempt to improve diet and exercise patterns to aid in management of this problem.  She anticipates having blood work performed in your office shortly    5. Non-ST elevation (NSTEMI) myocardial infarction (CMS/HCC) (HCC)  Mirela had significant disease in her circumflex, but her LAD and RCA did not have a significant disease.  She does have aggressive atherosclerosis and she is only 53 years old.  I asked her to return to the office for an exercise stress echocardiogram.  We will continue with aggressive medical management.  She was recently in [place] and says that she walked \"miles\" every day without any angina.  We need to be vigilant since she is diabetic.    Return in about 6 months (around 2019) for next scheduled " follow-up, earlier with any change in symptoms.  Thank you for allowing me to participate in the care of this patient.  I hope this information is helpful.    Zara Mccloud MD   11/21/2018  9:54 AM

## 2018-11-21 NOTE — PROGRESS NOTES
Patient ID: Mirela Lopez is a 53 y.o. female. 1965  HPI   Mirela comes to the office to day for follow-up regarding her coronary artery disease status post non-ST segment elevation myocardial infarction while in [place] in 2017.  Cardiac catheterization at that time showed a 95% mid and distal circumflex with thrombus.  There is also a 90% proximal circumflex lesion and she underwent successful NERI placement x2 with that time.  She has completed 1 year of dual antiplatelet therapy with aspirin and Brilinta and is now on aspirin alone.  She was also found to have right iliac claudication and underwent successful right iliac stent in 2017.  She says she just received notification that she needs to follow-up with Dr. Ugalde.    I am happy to report that she is not smoking.  She has been exercising 3 times a week, but not as much recently because of a vacation and the fact that she was working a second job.  Mirela denies any chest discomfort or anginal symptoms reminiscent of her original event.  She denies any claudication.  She is compliant with her medications.  She has not used any sublingual nitroglycerin.       Past Medical History:   Diagnosis Date   • Coronary artery disease    • GERD (gastroesophageal reflux disease)    • Hyperlipidemia    • Hypertension    • Impaired fasting glucose    • Myocardial infarction (CMS/[facility]) ([facility]) 2017    NSTEMI with stent left circumflex   • Obesity    • Peripheral vascular disease (CMS/[facility]) ([facility]) 2017    Right iliac claudication.  Aortogram demonstrated near occlusion of the right common iliac.  Successful right iliac stent 2017     Past Surgical History:   Procedure Laterality Date   • CARDIAC CATHETERIZATION      LAD tortuous, no significant disease.  Dominant RCA-patent.  90% proximal cx, 95% mid cx and distal thrombus   • CARPAL TUNNEL RELEASE     •  SECTION     • CORONARY STENT PLACEMENT  2017    NERI ×2 left circumflex-performed in  [place]   • ILIAC ARTERY STENT  10/2017    Dr. Ugalde     Social History   Substance Use Topics   • Smoking status: Former Smoker     Quit date: 8/12/2017   • Smokeless tobacco: Never Used   • Alcohol use Yes      Comment: Occasional social alcohol     Family History   Problem Relation Age of Onset   • Lung cancer Mother    • Heart disease Father    • Hyperlipidemia Father    • Diabetes type II Maternal Grandmother         Allergies:Adhesive and Adhesive tape-silicones    Current Outpatient Prescriptions:   •  aspirin 81 mg enteric coated tablet, Take 81 mg by mouth daily.  •  atorvastatin (LIPITOR) 80 mg tablet, Take 1 tablet (80 mg total) by mouth daily.,   •  ferrous sulfate 325 mg (65 mg iron) tablet, Take 1 tablet by mouth 3 (three) times a day.,   •  lisinopril (PRINIVIL) 10 mg tablet, Take 1 tablet (10 mg total) by mouth daily., Disp:   •  metFORMIN XR (GLUCOPHAGE-XR) 750 mg 24 hr tablet, Take 750 mg by mouth daily. With evening meal.  •  metoprolol succinate XL (TOPROL-XL) 100 mg 24 hr tablet, Take 1 tablet (100 mg total) by mouth daily.,  •  nitroglycerin (NITROSTAT) 0.4 mg SL tablet, Place 0.4 mg under the tongue. place 1 tablet by sublingual route at the 1st sign of attack; may repeat every 5 min until relief; if pain persists after 3 tablets in 15 min, prompt medical attention is recommended,      Review of Systems   Constitution: Positive for weight gain. Negative for chills and fever.   HENT: Negative for nosebleeds.    Eyes: Negative for visual disturbance.   Cardiovascular: Negative for chest pain, claudication, dyspnea on exertion, irregular heartbeat, leg swelling, near-syncope, orthopnea, palpitations, paroxysmal nocturnal dyspnea and syncope.   Respiratory: Negative for hemoptysis, shortness of breath, snoring, sputum production and wheezing.    Endocrine: Negative for heat intolerance.   Hematologic/Lymphatic: Negative for bleeding problem. Does not bruise/bleed easily.   Skin: Negative for  "color change and unusual hair distribution.   Musculoskeletal: Negative for back pain, falls and myalgias.   Gastrointestinal: Negative for hematemesis, jaundice and melena.   Genitourinary: Negative for flank pain and hematuria.   Neurological: Negative for excessive daytime sleepiness, dizziness, focal weakness, headaches and light-headedness.   Psychiatric/Behavioral: Negative for memory loss. The patient is not nervous/anxious.    Allergic/Immunologic: Negative for environmental allergies.     Vitals:    11/21/18 0926   BP: 110/66 millimeters mercury, repeat blood pressure 126/76 mmHg   BP Location: Left upper arm   Patient Position: Sitting   Pulse: 80   Resp: 16   Weight: 108 kg (238 lb 9.6 oz)   Height: 1.626 m (5' 4\")     BP Readings from Last 3 Encounters:   11/21/18 110/66   05/21/18 110/70     Wt Readings from Last 3 Encounters:   11/21/18 108 kg (238 lb 9.6 oz)   05/21/18 106 kg (234 lb 3.2 oz)     Physical Exam   Constitutional: She is oriented to person, place, and time. She appears well-developed. She is cooperative.   Obese white female in no apparent distress   HENT:   Head: Normocephalic and atraumatic.   Mouth/Throat: Oropharynx is clear and moist and mucous membranes are normal.   Eyes: Conjunctivae and EOM are normal. No scleral icterus.   Neck: Trachea normal and normal range of motion. Neck supple. No hepatojugular reflux and no JVD present. Carotid bruit is not present. No thyromegaly present.   Cardiovascular: Normal rate, regular rhythm, S1 normal, S2 normal and normal heart sounds.  Exam reveals decreased pulses. Exam reveals no gallop, no S3, no S4 and no friction rub.    Pulses:       Radial pulses are 2+ on the right side, and 2+ on the left side.        Posterior tibial pulses are 1+ on the right side, and 1+ on the left side.   There is a 1/6 systolic murmur at the left sternal border.   Pulmonary/Chest: Effort normal and breath sounds normal. She has no wheezes. She has no rhonchi. " She has no rales.   Abdominal: Soft. Bowel sounds are normal. She exhibits no abdominal bruit. There is no tenderness.   Obese abdomen   Musculoskeletal: Normal range of motion. She exhibits no tenderness or deformity.   Neurological: She is alert and oriented to person, place, and time. No cranial nerve deficit.   Skin: Skin is warm, dry and intact. No rash noted. No cyanosis. Nails show no clubbing.   Psychiatric: She has a normal mood and affect. Her speech is normal and behavior is normal.          Lab Results   Component Value Date    WBC 8.59 10/27/2017    HGB 8.2 (L) 10/27/2017    HCT 24.0 (L) 10/27/2017    MCV 89.2 10/27/2017     10/27/2017     Lab Results   Component Value Date    GLUCOSE 157 (H) 10/27/2017     10/27/2017    K 3.7 10/27/2017    CO2 23 10/27/2017     10/27/2017    BUN 6 (L) 10/27/2017    CREATININE 0.6 10/27/2017     Imaging:  CATH LAB:  Cath (LAD tortuous, no significant disease. RCA dominant and patent. Left circumflex with 90% proximal lesion, 95% mid lesion and distal thrombus. Successful NERI ×2 left circumflex.) - 2017     ECHO/MUGA:  Echo (EF 0.65 (65%), Top normal LV size with overall preserved function. No significant pulmonary hypertension by Doppler. Technically difficult study.) - 2017     VASCULAR:  Aortogram w/Runoff (Occluded right common iliac at origin with collaterals. Patent left common iliac and common femoral.) - 2017     Abd Ao Duplex (Elevated velocities right common iliac with monophasic flow.) - 2017     Periph Intervention (Successful right iliac stent.) - 10/2017    EK. Essential hypertension  Mirela has well-controlled blood pressure at the present time on lisinopril 10 mg daily and Toprol- mg daily. Discussed sodium restriction, maintaining ideal body weight and regular exercise program as physiologic means to help achieve blood pressure control.     2. Abnormal EKG  EKG is abnormal but unchanged.  Continue aggressive  "medical therapy.    3. Claudication (CMS/[facility]) (HCC)  Mirela states that her claudication is under good control and really not an issue right now.The patient was encouraged regarding regular physical activity.  We discussed the importance of seeking immediate medical attention with any changes in color, sensation, nonhealing ulcers or wounds or significant change in walking distance.  LDL goal less than 70 mg/dL. Continue antiplatelet therapy.  She will be following up with Dr. Ugalde is recommended.    4. Elevated lipids  Recommend intensive lipid-lowering with LDL goal less than 70 mg/dL. Appropriate medical therapy discussed. The patient is asked to make an attempt to improve diet and exercise patterns to aid in management of this problem.  She anticipates having blood work performed in your office shortly    5. Non-ST elevation (NSTEMI) myocardial infarction (CMS/[facility]) (HCC)  Mirela had significant disease in her circumflex, but her LAD and RCA did not have a significant disease.  She does have aggressive atherosclerosis and she is only 53 years old.  I asked her to return to the office for an exercise stress echocardiogram.  We will continue with aggressive medical management.  She was recently in [place] and says that she walked \"miles\" every day without any angina.  We need to be vigilant since she is diabetic.    Return in about 6 months (around 5/21/2019) for next scheduled follow-up, earlier with any change in symptoms.  Thank you for allowing me to participate in the care of this patient.  I hope this information is helpful.    Zara Mccloud MD   11/21/2018  9:54 AM    "

## 2018-11-27 ENCOUNTER — TELEPHONE (OUTPATIENT)
Dept: VASCULAR SURGERY | Facility: CLINIC | Age: 53
End: 2018-11-27

## 2018-11-27 ENCOUNTER — TRANSCRIBE ORDERS (OUTPATIENT)
Dept: SCHEDULING | Age: 53
End: 2018-11-27

## 2018-11-27 DIAGNOSIS — I73.9 PAD (PERIPHERAL ARTERY DISEASE) (CMS/HCC): Primary | ICD-10-CM

## 2018-11-27 DIAGNOSIS — I73.9 PERIPHERAL VASCULAR DISEASE (CMS/HCC): Primary | ICD-10-CM

## 2018-11-27 NOTE — TELEPHONE ENCOUNTER
Pt needs a precert for her US. Could you please obtain and I can call her with the number?      Thank you

## 2018-12-26 DIAGNOSIS — I73.9 PERIPHERAL VASCULAR DISEASE (CMS/HCC): Primary | ICD-10-CM

## 2019-01-07 ENCOUNTER — TELEPHONE (OUTPATIENT)
Dept: CARDIOLOGY | Facility: HOSPITAL | Age: 54
End: 2019-01-07

## 2019-01-09 ENCOUNTER — OFFICE VISIT (OUTPATIENT)
Dept: VASCULAR SURGERY | Facility: CLINIC | Age: 54
End: 2019-01-09
Payer: COMMERCIAL

## 2019-01-09 ENCOUNTER — HOSPITAL ENCOUNTER (OUTPATIENT)
Dept: CARDIOLOGY | Facility: HOSPITAL | Age: 54
Discharge: HOME | End: 2019-01-09
Attending: NURSE PRACTITIONER
Payer: COMMERCIAL

## 2019-01-09 VITALS — HEIGHT: 64 IN | BODY MASS INDEX: 37.56 KG/M2 | WEIGHT: 220 LBS

## 2019-01-09 DIAGNOSIS — I73.9 PAD (PERIPHERAL ARTERY DISEASE) (CMS/HCC): ICD-10-CM

## 2019-01-09 DIAGNOSIS — I73.9 CLAUDICATION (CMS/HCC): Primary | ICD-10-CM

## 2019-01-09 LAB
BSA FOR ECHO PROCEDURE: 2.12 M2
IMMEDIATE ARM BP: 115 MMHG
IMMEDIATE LEFT ABI: 0.96
IMMEDIATE LEFT TIBIAL: 110 MMHG
IMMEDIATE RIGHT ABI: 1.1
IMMEDIATE RIGHT TIBIAL: 126 MMHG
LEFT 1ST TOE INDEX: 0.72
LEFT 1ST TOE: 85 MMHG
LEFT ABI: 0.95
LEFT ARM BP: 118 MMHG
LEFT DORSALIS PEDIS INDEX: 0.92
LEFT DORSALIS PEDIS: 109 MMHG
LEFT LOW THIGH INDEX: 0.92
LEFT LOW THIGH: 108 MMHG
LEFT POST TIBIAL INDEX: 0.95
LEFT POSTERIOR TIBIAL: 112 MMHG
LEFT PROXIMAL CALF INDEX: 0.88
LEFT PROXIMAL CALF: 104 MMHG
LEFT TBI: 0.72
LT BRACHIAL PRESSURE: 118 MMHG
RIGHT 1ST TOE INDEX: 0.75
RIGHT 1ST TOE: 89 MMHG
RIGHT ABI: 1.12
RIGHT ARM BP: 106 MMHG
RIGHT DORSALIS PEDIS INDEX: 1
RIGHT DORSALIS PEDIS: 118 MMHG
RIGHT LOW THIGH INDEX: 1.34
RIGHT LOW THIGH: 158 MMHG
RIGHT POST TIBIAL INDEX: 1.12
RIGHT POSTERIOR TIBIAL: 132 MMHG
RIGHT PROXIMAL CALF INDEX: 1.08
RIGHT PROXIMAL CALF: 128 MMHG
RIGHT TBI: 0.75
RT BRACHIAL PRESSURE: 106 MMHG
TOE RAISES: 1

## 2019-01-09 PROCEDURE — 99214 OFFICE O/P EST MOD 30 MIN: CPT | Performed by: SURGERY

## 2019-01-09 PROCEDURE — 93923 UPR/LXTR ART STDY 3+ LVLS: CPT

## 2019-01-09 PROCEDURE — 93923 UPR/LXTR ART STDY 3+ LVLS: CPT | Mod: 26 | Performed by: INTERNAL MEDICINE

## 2019-01-09 NOTE — ASSESSMENT & PLAN NOTE
Will recommend another PVR with exercise in 6 months.  She is on aspirin from an antiplatelet standpoint which is adequate from my standpoint.  Applied her continued abstinence of smoking.

## 2019-01-09 NOTE — PROGRESS NOTES
[facility]  Follow-up Office Note  @ [facility]        DETAILS OF CONSULTATION   2019  Consulting Service:  [facility]    PCP:  Semaj Reina    Diagnosis: PAD     HISTORY OF PRESENT ILLNESS        This is a 53 y.o. female returning to the office for continued management of peripheral arterial disease, she is status post bilateral iliac stents about a year and a half ago.  She reports fortunately that she stop smoking which is excellent.    She is taking a number of trips with her family recently including [place] to [place] as well as [place].  She reports that she is walking without any recurrent claudication pain.  Overall she has no significant complaints and is here for routine visit.    She did have a PVR with exercise performed in the [facility] which we reviewed together.      PAST MEDICAL AND SURGICAL HISTORY        Past Medical History:   Diagnosis Date   • Coronary artery disease    • GERD (gastroesophageal reflux disease)    • Hyperlipidemia    • Hypertension    • Impaired fasting glucose    • Myocardial infarction (CMS/[facility]) ([facility]) 2017    NSTEMI with stent left circumflex   • Obesity    • Peripheral vascular disease (CMS/[facility]) ([facility]) 2017    Right iliac claudication.  Aortogram demonstrated near occlusion of the right common iliac.  Successful right iliac stent 2017       Past Surgical History:   Procedure Laterality Date   • CARDIAC CATHETERIZATION      LAD tortuous, no significant disease.  Dominant RCA-patent.  90% proximal cx, 95% mid cx and distal thrombus   • CARPAL TUNNEL RELEASE     •  SECTION     • CORONARY STENT PLACEMENT  2017    NERI ×2 left circumflex-performed in [place]   • ILIAC ARTERY STENT  10/2017    Dr. Ugalde       MEDICATIONS        Current Outpatient Prescriptions on File Prior to Visit   Medication Sig Dispense Refill   • aspirin 81 mg enteric coated tablet Take  81 mg by mouth daily.     • atorvastatin (LIPITOR) 80 mg tablet Take 1 tablet (80 mg total) by mouth daily. 30 tablet 5   • ferrous sulfate 325 mg (65 mg iron) tablet Take 1 tablet by mouth 3 (three) times a day.  6   • lisinopril (PRINIVIL) 10 mg tablet Take 1 tablet (10 mg total) by mouth daily. 30 tablet 5   • metFORMIN XR (GLUCOPHAGE-XR) 750 mg 24 hr tablet Take 750 mg by mouth daily. With evening meal.     • metoprolol succinate XL (TOPROL-XL) 100 mg 24 hr tablet Take 1 tablet (100 mg total) by mouth daily. 30 tablet 5   • nitroglycerin (NITROSTAT) 0.4 mg SL tablet Place 1 tablet (0.4 mg total) under the tongue every 5 (five) minutes as needed for chest pain. 25 tablet 3     No current facility-administered medications on file prior to visit.        ALLERGIES        Adhesive and Adhesive tape-silicones     PHYSICAL EXAMINATION      There were no vitals taken for this visit.  There is no height or weight on file to calculate BMI.      Physical Exam   Constitutional: She is oriented to person, place, and time. She appears well-developed and well-nourished.   HENT:   Head: Normocephalic and atraumatic.   Eyes: EOM are normal. Pupils are equal, round, and reactive to light.   Neck: Normal range of motion. Neck supple. No tracheal deviation present.   Cardiovascular: Normal rate.    Pulses:       Dorsalis pedis pulses are 2+ on the right side, and 2+ on the left side.   Pulmonary/Chest: Effort normal and breath sounds normal.   Abdominal: Soft. She exhibits no mass.   Musculoskeletal: Normal range of motion.   Feet:   Right Foot:   Skin Integrity: Negative for ulcer.   Left Foot:   Skin Integrity: Negative for ulcer.   Neurological: She is alert and oriented to person, place, and time.   Skin: Skin is warm and dry.   Psychiatric: She has a normal mood and affect.   Vitals reviewed.      LABS / IMAGING / EKG        Imaging  Significant findings include:       PVR from the [facility] from January 9, 2019.  I  reviewed the PVR with her, she has normal ankle indices at rest and after exercise there is no drop to speak of bilaterally.  Overall excellent result with no recurrent obstruction.        ASSESSMENT AND PLAN         Problem List Items Addressed This Visit        Nervous    Claudication (CMS/HCC) (HCC) - Primary    Overview     53-year-old former smoker had significant intermittent claudication, status post bilateral stenting, her right common iliac was completely occluded, status post procedure has had no residual symptoms, excellent clinical result.  PVR performed today for surveillance,         Current Assessment & Plan     Will recommend another PVR with exercise in 6 months.  She is on aspirin from an antiplatelet standpoint which is adequate from my standpoint.  Applied her continued abstinence of smoking.         Relevant Orders    Ultrasound arterial leg bilateral             Return in about 6 months (around 7/9/2019) for To review ordered studies.     Sandip Ugalde MD, FACS      Vascular and Endovascular Specialist  [place] [address]  Phone [phone]  Fax  [phone]     Thank you very much for allowing us to participate in the care of your patient.  Please not hesitate to call or email if there are any questions.  I can be reached at [phone] (mobile) or [email or web address].  Sincerely.

## 2019-01-09 NOTE — LETTER
January 9, 2019     Semaj Reina  [address]  [place] [place] [facility] [address]    Patient: Mirela Lopez   YOB: 1965   Date of Visit: 1/9/2019       Dear Dr. Reina:    Thank you for referring Mirela Lopez to me for evaluation. Below are my notes for this consultation.    If you have questions, please do not hesitate to call me. I look forward to following your patient along with you.         Sincerely,        Sandip Ugalde MD        CC: MD Tram Galan Robert J, MD  1/9/2019  1:06 PM  Signed       [facility]  Follow-up Office Note  @ [facility]        DETAILS OF CONSULTATION   1/9/2019  Consulting Service:  [facility]    PCP:  Semaj Reina    Diagnosis: PAD     HISTORY OF PRESENT ILLNESS        This is a 53 y.o. female returning to the office for continued management of peripheral arterial disease, she is status post bilateral iliac stents about a year and a half ago.  She reports fortunately that she stop smoking which is excellent.    She is taking a number of trips with her family recently including [place] to [place] as well as [place].  She reports that she is walking without any recurrent claudication pain.  Overall she has no significant complaints and is here for routine visit.    She did have a PVR with exercise performed in the [facility] which we reviewed together.      PAST MEDICAL AND SURGICAL HISTORY        Past Medical History:   Diagnosis Date   • Coronary artery disease    • GERD (gastroesophageal reflux disease)    • Hyperlipidemia    • Hypertension    • Impaired fasting glucose    • Myocardial infarction (CMS/[facility]) ([facility]) 2017    NSTEMI with stent left circumflex   • Obesity    • Peripheral vascular disease (CMS/[facility]) ([facility]) 2017    Right iliac claudication.  Aortogram demonstrated near occlusion of the right common iliac.  Successful right iliac stent October 2017       Past  Surgical History:   Procedure Laterality Date   • CARDIAC CATHETERIZATION      LAD tortuous, no significant disease.  Dominant RCA-patent.  90% proximal cx, 95% mid cx and distal thrombus   • CARPAL TUNNEL RELEASE     •  SECTION     • CORONARY STENT PLACEMENT  2017    NERI ×2 left circumflex-performed in [place]   • ILIAC ARTERY STENT  10/2017    Dr. Ugalde       MEDICATIONS        Current Outpatient Prescriptions on File Prior to Visit   Medication Sig Dispense Refill   • aspirin 81 mg enteric coated tablet Take 81 mg by mouth daily.     • atorvastatin (LIPITOR) 80 mg tablet Take 1 tablet (80 mg total) by mouth daily. 30 tablet 5   • ferrous sulfate 325 mg (65 mg iron) tablet Take 1 tablet by mouth 3 (three) times a day.  6   • lisinopril (PRINIVIL) 10 mg tablet Take 1 tablet (10 mg total) by mouth daily. 30 tablet 5   • metFORMIN XR (GLUCOPHAGE-XR) 750 mg 24 hr tablet Take 750 mg by mouth daily. With evening meal.     • metoprolol succinate XL (TOPROL-XL) 100 mg 24 hr tablet Take 1 tablet (100 mg total) by mouth daily. 30 tablet 5   • nitroglycerin (NITROSTAT) 0.4 mg SL tablet Place 1 tablet (0.4 mg total) under the tongue every 5 (five) minutes as needed for chest pain. 25 tablet 3     No current facility-administered medications on file prior to visit.        ALLERGIES        Adhesive and Adhesive tape-silicones     PHYSICAL EXAMINATION      There were no vitals taken for this visit.  There is no height or weight on file to calculate BMI.      Physical Exam   Constitutional: She is oriented to person, place, and time. She appears well-developed and well-nourished.   HENT:   Head: Normocephalic and atraumatic.   Eyes: EOM are normal. Pupils are equal, round, and reactive to light.   Neck: Normal range of motion. Neck supple. No tracheal deviation present.   Cardiovascular: Normal rate.    Pulses:       Dorsalis pedis pulses are 2+ on the right side, and 2+ on the left side.   Pulmonary/Chest:  Effort normal and breath sounds normal.   Abdominal: Soft. She exhibits no mass.   Musculoskeletal: Normal range of motion.   Feet:   Right Foot:   Skin Integrity: Negative for ulcer.   Left Foot:   Skin Integrity: Negative for ulcer.   Neurological: She is alert and oriented to person, place, and time.   Skin: Skin is warm and dry.   Psychiatric: She has a normal mood and affect.   Vitals reviewed.      LABS / IMAGING / EKG        Imaging  Significant findings include:       PVR from the [facility] from January 9, 2019.  I reviewed the PVR with her, she has normal ankle indices at rest and after exercise there is no drop to speak of bilaterally.  Overall excellent result with no recurrent obstruction.        ASSESSMENT AND PLAN         Problem List Items Addressed This Visit        Nervous    Claudication (CMS/HCC) (HCC) - Primary    Overview     53-year-old former smoker had significant intermittent claudication, status post bilateral stenting, her right common iliac was completely occluded, status post procedure has had no residual symptoms, excellent clinical result.  PVR performed today for surveillance,         Current Assessment & Plan     Will recommend another PVR with exercise in 6 months.  She is on aspirin from an antiplatelet standpoint which is adequate from my standpoint.  Applied her continued abstinence of smoking.         Relevant Orders    Ultrasound arterial leg bilateral             Return in about 6 months (around 7/9/2019) for To review ordered studies.     Sandip Ugalde MD, FACS      Vascular and Endovascular Specialist  [address] [place] [address]  Phone [phone]  Fax  [phone]     Thank you very much for allowing us to participate in the care of your patient.  Please not hesitate to call or email if there are any questions.  I can be reached at [phone] (mobile) or [email or web address].  Sincerely.

## 2019-03-29 ENCOUNTER — TELEPHONE (OUTPATIENT)
Dept: CARDIOLOGY | Facility: CLINIC | Age: 54
End: 2019-03-29

## 2019-03-29 NOTE — TELEPHONE ENCOUNTER
Kal is scheduled for a stress echo on 2019  Insurance personal ch  ID #[id number]  1965  Will need precert thx

## 2019-04-19 ENCOUNTER — HOSPITAL ENCOUNTER (OUTPATIENT)
Dept: CARDIOLOGY | Facility: CLINIC | Age: 54
Discharge: HOME | End: 2019-04-19
Attending: INTERNAL MEDICINE
Payer: COMMERCIAL

## 2019-04-19 VITALS
BODY MASS INDEX: 37.56 KG/M2 | HEIGHT: 64 IN | HEART RATE: 106 BPM | DIASTOLIC BLOOD PRESSURE: 72 MMHG | WEIGHT: 220 LBS | SYSTOLIC BLOOD PRESSURE: 122 MMHG

## 2019-04-19 DIAGNOSIS — R94.31 ABNORMAL EKG: ICD-10-CM

## 2019-04-19 DIAGNOSIS — I21.4 NON-ST ELEVATION (NSTEMI) MYOCARDIAL INFARCTION (CMS/HCC): ICD-10-CM

## 2019-04-19 PROCEDURE — 93350 STRESS TTE ONLY: CPT | Performed by: INTERNAL MEDICINE

## 2019-04-22 LAB
AORTIC ROOT ANNULUS: 3 CM
AORTIC VALVE MEAN VELOCITY: 1.21 M/S
AORTIC VALVE VELOCITY TIME INTEGRAL: 33.8 CM
ASCENDING AORTA: 2.7 CM
AV MEAN GRADIENT: 7 MMHG
AV PEAK GRADIENT: 10 MMHG
AV PEAK VELOCITY-S: 1.6 M/S
AV VALVE AREA: 1.36 CM2
AV VALVE AREA: 1.66 CM2
BSA FOR ECHO PROCEDURE: 2.12 M2
DOP CALC LVOT STROKE VOLUME: 56.04 ML
E WAVE DECELERATION TIME: 197 MS
E/A RATIO: 1
E/E' RATIO: 9.2
E/LAT E' RATIO: 8.7
EDV (BP): 61.8 CM3
EF (A4C): 64.3 %
EF A2C: 57.8 %
EJECTION FRACTION: 59.7 %
ESV (BP): 24.9 CM3
FRACTIONAL SHORTENING: 31.6 %
INTERVENTRICULAR SEPTUM: 0.95 CM
LA ESV (BP): 28.3 CM3
LA ESV INDEX (A2C): 12.88 CM3/M2
LA ESV INDEX (BP): 13.35 CM3/M2
LA/AORTA RATIO: 1.17
LAAS-AP2: 12.2 CM2
LAAS-AP4: 13.4 CM2
LAD 2D: 3.5 CM
LALD A4C: 4.65 CM
LALD A4C: 4.79 CM
LAV-S: 27.3 CM3
LEFT ATRIUM VOLUME INDEX: 14.15 CM3/M2
LEFT ATRIUM VOLUME: 30 CM3
LEFT INTERNAL DIMENSION IN SYSTOLE: 3.4 CM (ref 3.02–4.57)
LEFT VENTRICLE DIASTOLIC VOLUME INDEX: 29.34 CM3/M2
LEFT VENTRICLE DIASTOLIC VOLUME: 62.2 CM3
LEFT VENTRICLE SYSTOLIC VOLUME INDEX: 10.47 CM3/M2
LEFT VENTRICLE SYSTOLIC VOLUME: 22.2 CM3
LEFT VENTRICULAR INTERNAL DIMENSION IN DIASTOLE: 4.97 CM (ref 5.14–7.14)
LEFT VENTRICULAR POSTERIOR WALL IN END DIASTOLE: 0.86 CM (ref 0.66–1.23)
LV DIASTOLIC VOLUME: 59.9 CM3
LV ESV (APICAL 2 CHAMBER): 25.3 CM3
LVAD-AP2: 23.3 CM2
LVAD-AP4: 23.3 CM2
LVAS-AP2: 13.7 CM2
LVAS-AP4: 12.2 CM2
LVEDVI(A2C): 28.25 CM3/M2
LVEDVI(BP): 29.15 CM3/M2
LVESVI(A2C): 11.93 CM3/M2
LVESVI(BP): 11.75 CM3/M2
LVLD-AP2: 7.25 CM
LVLD-AP4: 7.04 CM
LVLS-AP2: 6.13 CM
LVLS-AP4: 5.51 CM
LVOT 2D: 1.7 CM
LVOT A: 2.27 CM2
LVOT MG: 2 MMHG
LVOT MV: 0.72 M/S
LVOT PEAK VELOCITY: 0.96 M/S
LVOT PG: 4 MMHG
LVOT VTI: 24.7 CM
MV E'TISSUE VEL-LAT: 0.11 M/S
MV E'TISSUE VEL-MED: 0.1 M/S
MV PEAK A VEL: 0.9 M/S
MV PEAK E VEL: 0.92 M/S
POSTERIOR WALL: 0.86 CM
RVOT VMAX: 0.78 M/S
RVOT VTI: 13.8 CM
STRESS ANGINA INDEX: 0
STRESS BASELINE BP: NORMAL MMHG
STRESS BASELINE HR: 80 BPM
STRESS ECHO POST RECOVERY HR: 118 BPM
STRESS PERCENT HR: 89 %
STRESS POST ESTIMATED WORKLOAD: 5 METS
STRESS POST EXERCISE DUR MIN: 3 MIN
STRESS POST PEAK BP: NORMAL MMHG
STRESS POST PEAK HR: 150 BPM
STRESS TARGET HR: 144 BPM
Z-SCORE OF LEFT VENTRICULAR DIMENSION IN END DIASTOLE: -1.98
Z-SCORE OF LEFT VENTRICULAR DIMENSION IN END SYSTOLE: -0.7
Z-SCORE OF LEFT VENTRICULAR POSTERIOR WALL IN END DIASTOLE: -0.25

## 2019-04-24 RX ORDER — METOPROLOL SUCCINATE 100 MG/1
100 TABLET, EXTENDED RELEASE ORAL DAILY
Qty: 30 TABLET | Refills: 5 | Status: SHIPPED | OUTPATIENT
Start: 2019-04-24 | End: 2019-11-18 | Stop reason: SDUPTHER

## 2019-04-24 RX ORDER — LISINOPRIL 10 MG/1
10 TABLET ORAL DAILY
Qty: 30 TABLET | Refills: 5 | Status: SHIPPED | OUTPATIENT
Start: 2019-04-24 | End: 2019-11-18 | Stop reason: SDUPTHER

## 2019-04-24 RX ORDER — ATORVASTATIN CALCIUM 80 MG/1
80 TABLET, FILM COATED ORAL DAILY
Qty: 30 TABLET | Refills: 5 | Status: SHIPPED | OUTPATIENT
Start: 2019-04-24 | End: 2019-11-18 | Stop reason: SDUPTHER

## 2019-05-14 ENCOUNTER — OFFICE VISIT (OUTPATIENT)
Dept: CARDIOLOGY | Facility: CLINIC | Age: 54
End: 2019-05-14
Payer: COMMERCIAL

## 2019-05-14 VITALS
HEIGHT: 64 IN | OXYGEN SATURATION: 97 % | RESPIRATION RATE: 16 BRPM | HEART RATE: 106 BPM | DIASTOLIC BLOOD PRESSURE: 84 MMHG | SYSTOLIC BLOOD PRESSURE: 120 MMHG | BODY MASS INDEX: 41.32 KG/M2 | WEIGHT: 242 LBS

## 2019-05-14 DIAGNOSIS — Z72.0 TOBACCO USE: ICD-10-CM

## 2019-05-14 DIAGNOSIS — R00.0 TACHYCARDIA: ICD-10-CM

## 2019-05-14 DIAGNOSIS — I25.10 CAD IN NATIVE ARTERY: Primary | ICD-10-CM

## 2019-05-14 DIAGNOSIS — R94.31 ABNORMAL EKG: ICD-10-CM

## 2019-05-14 DIAGNOSIS — E66.9 OBESITY WITH SERIOUS COMORBIDITY, UNSPECIFIED CLASSIFICATION, UNSPECIFIED OBESITY TYPE: ICD-10-CM

## 2019-05-14 DIAGNOSIS — I73.9 CLAUDICATION (CMS/HCC): ICD-10-CM

## 2019-05-14 DIAGNOSIS — I10 ESSENTIAL HYPERTENSION: ICD-10-CM

## 2019-05-14 DIAGNOSIS — E78.5 ELEVATED LIPIDS: ICD-10-CM

## 2019-05-14 PROCEDURE — 93000 ELECTROCARDIOGRAM COMPLETE: CPT | Performed by: INTERNAL MEDICINE

## 2019-05-14 PROCEDURE — 99214 OFFICE O/P EST MOD 30 MIN: CPT | Performed by: INTERNAL MEDICINE

## 2019-05-14 RX ORDER — METFORMIN HYDROCHLORIDE 750 MG/1
1500 TABLET, EXTENDED RELEASE ORAL DAILY
COMMUNITY
Start: 2019-05-14 | End: 2021-10-11 | Stop reason: SDUPTHER

## 2019-05-14 NOTE — LETTER
"May 15, 2019     Semaj Reina  [address]  [address]  [place] [address]    Patient: Mirela Lopez   YOB: 1965   Date of Visit: 5/14/2019       Dear Dr. Reina:    Thank you for referring Mirela Lopez to me for evaluation. Below are my notes for this consultation.    If you have questions, please do not hesitate to call me. I look forward to following your patient along with you.         Sincerely,        Zara Mccloud MD        CC: MD Oneyda Naik Erin, MD  5/15/2019 12:03 PM  Signed       Cardiology Office Visit     Patient ID: Mirela Lopez 53 y.o. female 1965    History Present Illness     Mirela Lopez returns to the office today for follow-up of her coronary artery disease and peripheral vascular disease.  As you recall she was on vacation in [place] in August 2017 she developed increasing dyspnea and some atypical type GI/chest discomfort.  She eventually went to the hospital and had a cardiac catheterization demonstrating 95% mid and distal circumflex stenosis with thrombus and a 90% proximal circumflex lesion.  She underwent successful NERI x2 at that time.  The remainder of her vessels did not demonstrate any significant stenosis.  She had an aortogram at that time as well and was found to have an occluded right common iliac artery.    Subsequent evaluation of her peripheral circulation demonstrated near occlusion, but she still had some patency and underwent a successful right common iliac stent in October 2017 with Dr. Ugalde.  She has stopped smoking since undergoing her coronary and peripheral intervention and continues to abstain from tobacco.  Unfortunately, she has not lost any weight and is actually gaining weight.  She mentioned that you discuss this at her last office visit and she said \"I can either smoke or eat\".  We reviewed her most recent lab work and her hemoglobin A1c is poorly controlled.  She did not have lipids obtained at this most " recent visit.  She anticipates having blood work again in 3 months.    On review of systems, she denies chest pain or any change in her baseline exercise capacity.  Her biggest limitation is claudication, although she states it is not progressive.  We reviewed the results of her most recent exercise stress echocardiogram which showed a small basal inferior scar and no significant ischemia.  Her exercise capacity, however, was quite poor finishing only stage I of the Abner protocol.  She says she became fatigued and had claudication at this level of exercise.    Past History     Past Medical History:   Diagnosis Date   • Coronary artery disease    • GERD (gastroesophageal reflux disease)    • Hyperlipidemia    • Hypertension    • Impaired fasting glucose    • Myocardial infarction (CMS/[facility]) ([facility]) 2017    NSTEMI with stent left circumflex   • Obesity    • Peripheral vascular disease (CMS/[facility]) ([facility]) 2017    Right iliac claudication.  Aortogram demonstrated near occlusion of the right common iliac.  Successful right iliac stent 2017     Past Surgical History:   Procedure Laterality Date   • CARDIAC CATHETERIZATION      LAD tortuous, no significant disease.  Dominant RCA-patent.  90% proximal cx, 95% mid cx and distal thrombus   • CARPAL TUNNEL RELEASE     •  SECTION     • CORONARY STENT PLACEMENT  2017    NERI ×2 left circumflex-performed in [place]   • ILIAC ARTERY STENT  10/2017    Dr. Ugalde     Social History:  reports that she quit smoking about 21 months ago. She has never used smokeless tobacco. She reports that she drinks alcohol. She reports that she does not use drugs.    Family History: family history includes Diabetes type II in her maternal grandmother; Heart disease in her father; Hyperlipidemia in her father; Lung cancer in her mother.  Allergies/Medications   Allergies:Adhesive and Adhesive tape-silicones    Medications:  Outpatient Encounter Prescriptions as of 2019:   •   "aspirin 81 mg enteric coated tablet, Take 81 mg by mouth daily.  •  atorvastatin (LIPITOR) 80 mg tablet, Take 1 tablet (80 mg total) by mouth daily.  •  ferrous sulfate 325 mg (65 mg iron) tablet, Take 1 tablet by mouth 3 (three) times a day.  •  lisinopril (PRINIVIL) 10 mg tablet, Take 1 tablet (10 mg total) by mouth daily.  •  metFORMIN XR (GLUCOPHAGE-XR) 750 mg 24 hr tablet, Take 2 tablets (1,500 mg total) by mouth daily. With evening meal.  •  metoprolol succinate XL (TOPROL-XL) 100 mg 24 hr tablet, Take 1 tablet (100 mg total) by mouth daily.  •  nitroglycerin (NITROSTAT) 0.4 mg SL tablet, Place 1 tablet (0.4 mg total) under the tongue every 5 (five) minutes as needed for chest pain.    ROS/Physical Exam   ROS  Pertinent items are noted in HPI.  Weight gain and elevated glucose.  Comprehensive (12+ point) ROS otherwise negative.  Vitals:    05/14/19 0919   BP: 120/84   BP Location: Left upper arm   Patient Position: Sitting   Pulse: (!) 106   Resp: 16   SpO2: 97%   Weight: 110 kg (242 lb)   Height: 1.626 m (5' 4\")     BP Readings from Last 3 Encounters:   05/14/19 120/84   04/19/19 122/72   11/21/18 110/66     Wt Readings from Last 3 Encounters:   05/14/19 110 kg (242 lb)   04/19/19 99.8 kg (220 lb)   01/09/19 99.8 kg (220 lb)     Physical Exam   Constitutional: She is oriented to person, place, and time. She appears well-developed. She is cooperative.   Obese white female in no apparent distress   HENT:   Head: Normocephalic and atraumatic.   Mouth/Throat: Oropharynx is clear and moist and mucous membranes are normal.   Eyes: Conjunctivae and EOM are normal. No scleral icterus.   Neck: Trachea normal and normal range of motion. Neck supple. No hepatojugular reflux and no JVD present. Carotid bruit is not present. No thyromegaly present.   Cardiovascular: Normal rate, regular rhythm, S1 normal, S2 normal and normal heart sounds.  Exam reveals decreased pulses. Exam reveals no gallop, no S3, no S4 and no " friction rub.    Pulses:       Radial pulses are 2+ on the right side, and 2+ on the left side.        Posterior tibial pulses are 1+ on the right side, and 1+ on the left side.   There is a 1/6 systolic murmur at the left sternal border.   Pulmonary/Chest: Effort normal and breath sounds normal. She has no wheezes. She has no rhonchi. She has no rales.   Abdominal: Soft. Bowel sounds are normal. She exhibits no abdominal bruit. There is no tenderness.   Obese abdomen   Musculoskeletal: Normal range of motion. She exhibits no tenderness or deformity.   Neurological: She is alert and oriented to person, place, and time. No cranial nerve deficit.   Skin: Skin is warm, dry and intact. No rash noted. No cyanosis. Nails show no clubbing.   Psychiatric: She has a normal mood and affect. Her speech is normal and behavior is normal.     Labs/Imaging/Procedures   Labs  Glucose 194, BUN 12, potassium 3.7, creatinine 0.6, hemoglobin 11.8, platelets 184,000, hemoglobin A1c 8.3    Imaging  Exercise stress echocardiogram 4/19/2019: Very technically difficult study.  Exercise EKG negative for ischemia or arrhythmia.  Poor exercise capacity completing only stage I Abner protocol.  Exercise EKG negative for ischemia or arrhythmia at that workload.  At rest, normal LV size and function with LVEF 60-65%.  Basal mid inferior hypokinesis.  Mildly dilated RV with preserved function.  Trace TR.  Trace MR.  Trileaflet aortic valve.  No significant stenosis or insufficiency.  Prominent pericardial fat pad.  Immediately following exercise overall LV function improves.  Persistent basal inferior hypokinesis consistent with scar.  No ischemia.      PVR 1/9/2019: Right JOURDAN 1.12 at rest and 1.1 following exercise.  Left JOURDAN 0.95 at rest and 0.96 following exercise.  Normal segmental pressures and PVRs.      Periph Intervention 10/2017: Successful right iliac stent.    Abd Ao Duplex 9/2017: Elevated velocities right MICHAEL with monophasic  flow.    Echo 9/2017: LVEF 65%, Top normal LV size with overall preserved function. No significant pulmonary hypertension by Doppler. Technically difficult study.    Cath 8/13/17: LAD tortuous, no significant disease. RCA dominant and patent. Left circumflex with 90% proximal lesion, 95% mid lesion and distal thrombus. Successful NERI ×2 left circumflex.    Labs 4/19/2019: Occluded right MICHAEL at origin with collaterals. Patent left MICHAEL and CFA    EKG  Assessment/Plan     1. CAD in native artery  Mirela had circumflex disease before age 60 with non-ST segment elevation myocardial infarction and successful NERI in the circumflex.  She needs aggressive risk factor modification.  We discussed the fact that her diabetes is not well controlled.  She needs weight loss and exercise, but admits that she finds this difficult.  Her son is apparently going to be getting a physical fitness patch as part of his extracurricular activities and she is going to try to work with him doing this.  She should continue Toprol- mg daily, lisinopril 10 mg daily, aspirin 81 mg daily and atorvastatin 80 mg daily.  Intensive lipid-lowering goal.    2. Tachycardia  Heart rate today is approximately 90 bpm.  I do think she has an element of anxiety.  She has no documented arrhythmia.    3. Abnormal EKG  EKG is abnormal but unchanged.  Continue current therapy.    4. Claudication (CMS/HCC) (HCC)  She has claudication at fairly low levels of exercise despite the fact that her PVRs and segmental pressures are normal.  This may reflect the fact that she has common iliac disease.  Again, we spoke about the importance of increasing her physical activity and exercise and try to increase her walking distance.  She should continue on antiplatelet therapy and abstain from smoking.    5. Tobacco use  Recommended continued complete tobacco cessation and congratulated her on the fact that she has maintained this since 2017.    6. Obesity with serious  comorbidity, unspecified classification, unspecified obesity type  The patient was counseled about potential long-term consequences of obesity including hypertension, heart disease, heart failure, pulmonary hypertension and joint/mobility problems.  Patient was encouraged to increase physical activity, including low impact exercise if necessary, follow a prudent diet and consider participating in a structured weight loss program    7. Essential hypertension  Continue lisinopril 10 mg daily and Toprol- mg daily.  Again, she would benefit from weight loss.  Low-salt diet.  We discussed the possibility of sleep apnea previously, but she does not wish to pursue any additional evaluation right now    Return in about 6 months (around 11/14/2019) for next scheduled follow-up, earlier with any change in symptoms.  I have reviewed the patient's medical history in detail and updated the computerized patient record.  Thank you for allowing me to participate in the care of this patient.  I hope this information is helpful.    Zara Mccloud MD   5/15/2019  11:33 AM

## 2019-05-15 NOTE — PROGRESS NOTES
"     Cardiology Office Visit     Patient ID: Mirela Lopez 53 y.o. female 1965    History Present Illness     Mirela Lopez returns to the office today for follow-up of her coronary artery disease and peripheral vascular disease.  As you recall she was on vacation in [place] in August 2017 she developed increasing dyspnea and some atypical type GI/chest discomfort.  She eventually went to the hospital and had a cardiac catheterization demonstrating 95% mid and distal circumflex stenosis with thrombus and a 90% proximal circumflex lesion.  She underwent successful NERI x2 at that time.  The remainder of her vessels did not demonstrate any significant stenosis.  She had an aortogram at that time as well and was found to have an occluded right common iliac artery.    Subsequent evaluation of her peripheral circulation demonstrated near occlusion, but she still had some patency and underwent a successful right common iliac stent in October 2017 with Dr. Ugalde.  She has stopped smoking since undergoing her coronary and peripheral intervention and continues to abstain from tobacco.  Unfortunately, she has not lost any weight and is actually gaining weight.  She mentioned that you discuss this at her last office visit and she said \"I can either smoke or eat\".  We reviewed her most recent lab work and her hemoglobin A1c is poorly controlled.  She did not have lipids obtained at this most recent visit.  She anticipates having blood work again in 3 months.    On review of systems, she denies chest pain or any change in her baseline exercise capacity.  Her biggest limitation is claudication, although she states it is not progressive.  We reviewed the results of her most recent exercise stress echocardiogram which showed a small basal inferior scar and no significant ischemia.  Her exercise capacity, however, was quite poor finishing only stage I of the Abner protocol.  She says she became fatigued and had claudication " at this level of exercise.    Past History     Past Medical History:   Diagnosis Date   • Coronary artery disease    • GERD (gastroesophageal reflux disease)    • Hyperlipidemia    • Hypertension    • Impaired fasting glucose    • Myocardial infarction (CMS/[facility]) ([facility])     NSTEMI with stent left circumflex   • Obesity    • Peripheral vascular disease (CMS/[facility]) ([facility])     Right iliac claudication.  Aortogram demonstrated near occlusion of the right common iliac.  Successful right iliac stent 2017     Past Surgical History:   Procedure Laterality Date   • CARDIAC CATHETERIZATION      LAD tortuous, no significant disease.  Dominant RCA-patent.  90% proximal cx, 95% mid cx and distal thrombus   • CARPAL TUNNEL RELEASE     •  SECTION     • CORONARY STENT PLACEMENT      NERI ×2 left circumflex-performed in [place]   • ILIAC ARTERY STENT  10/2017    Dr. Ugalde     Social History:  reports that she quit smoking about 21 months ago. She has never used smokeless tobacco. She reports that she drinks alcohol. She reports that she does not use drugs.    Family History: family history includes Diabetes type II in her maternal grandmother; Heart disease in her father; Hyperlipidemia in her father; Lung cancer in her mother.  Allergies/Medications   Allergies:Adhesive and Adhesive tape-silicones    Medications:  Outpatient Encounter Prescriptions as of 2019:   •  aspirin 81 mg enteric coated tablet, Take 81 mg by mouth daily.  •  atorvastatin (LIPITOR) 80 mg tablet, Take 1 tablet (80 mg total) by mouth daily.  •  ferrous sulfate 325 mg (65 mg iron) tablet, Take 1 tablet by mouth 3 (three) times a day.  •  lisinopril (PRINIVIL) 10 mg tablet, Take 1 tablet (10 mg total) by mouth daily.  •  metFORMIN XR (GLUCOPHAGE-XR) 750 mg 24 hr tablet, Take 2 tablets (1,500 mg total) by mouth daily. With evening meal.  •  metoprolol succinate XL (TOPROL-XL) 100 mg 24 hr tablet, Take 1 tablet (100 mg total) by  "mouth daily.  •  nitroglycerin (NITROSTAT) 0.4 mg SL tablet, Place 1 tablet (0.4 mg total) under the tongue every 5 (five) minutes as needed for chest pain.    ROS/Physical Exam   ROS  Pertinent items are noted in HPI.  Weight gain and elevated glucose.  Comprehensive (12+ point) ROS otherwise negative.  Vitals:    05/14/19 0919   BP: 120/84   BP Location: Left upper arm   Patient Position: Sitting   Pulse: (!) 106   Resp: 16   SpO2: 97%   Weight: 110 kg (242 lb)   Height: 1.626 m (5' 4\")     BP Readings from Last 3 Encounters:   05/14/19 120/84   04/19/19 122/72   11/21/18 110/66     Wt Readings from Last 3 Encounters:   05/14/19 110 kg (242 lb)   04/19/19 99.8 kg (220 lb)   01/09/19 99.8 kg (220 lb)     Physical Exam   Constitutional: She is oriented to person, place, and time. She appears well-developed. She is cooperative.   Obese white female in no apparent distress   HENT:   Head: Normocephalic and atraumatic.   Mouth/Throat: Oropharynx is clear and moist and mucous membranes are normal.   Eyes: Conjunctivae and EOM are normal. No scleral icterus.   Neck: Trachea normal and normal range of motion. Neck supple. No hepatojugular reflux and no JVD present. Carotid bruit is not present. No thyromegaly present.   Cardiovascular: Normal rate, regular rhythm, S1 normal, S2 normal and normal heart sounds.  Exam reveals decreased pulses. Exam reveals no gallop, no S3, no S4 and no friction rub.    Pulses:       Radial pulses are 2+ on the right side, and 2+ on the left side.        Posterior tibial pulses are 1+ on the right side, and 1+ on the left side.   There is a 1/6 systolic murmur at the left sternal border.   Pulmonary/Chest: Effort normal and breath sounds normal. She has no wheezes. She has no rhonchi. She has no rales.   Abdominal: Soft. Bowel sounds are normal. She exhibits no abdominal bruit. There is no tenderness.   Obese abdomen   Musculoskeletal: Normal range of motion. She exhibits no tenderness or " deformity.   Neurological: She is alert and oriented to person, place, and time. No cranial nerve deficit.   Skin: Skin is warm, dry and intact. No rash noted. No cyanosis. Nails show no clubbing.   Psychiatric: She has a normal mood and affect. Her speech is normal and behavior is normal.     Labs/Imaging/Procedures   Labs  Glucose 194, BUN 12, potassium 3.7, creatinine 0.6, hemoglobin 11.8, platelets 184,000, hemoglobin A1c 8.3    Imaging  Exercise stress echocardiogram 4/19/2019: Very technically difficult study.  Exercise EKG negative for ischemia or arrhythmia.  Poor exercise capacity completing only stage I Abner protocol.  Exercise EKG negative for ischemia or arrhythmia at that workload.  At rest, normal LV size and function with LVEF 60-65%.  Basal mid inferior hypokinesis.  Mildly dilated RV with preserved function.  Trace TR.  Trace MR.  Trileaflet aortic valve.  No significant stenosis or insufficiency.  Prominent pericardial fat pad.  Immediately following exercise overall LV function improves.  Persistent basal inferior hypokinesis consistent with scar.  No ischemia.      PVR 1/9/2019: Right JOURDAN 1.12 at rest and 1.1 following exercise.  Left JOURDAN 0.95 at rest and 0.96 following exercise.  Normal segmental pressures and PVRs.      Periph Intervention 10/2017: Successful right iliac stent.    Abd Ao Duplex 9/2017: Elevated velocities right MICHAEL with monophasic flow.    Echo 9/2017: LVEF 65%, Top normal LV size with overall preserved function. No significant pulmonary hypertension by Doppler. Technically difficult study.    Cath 8/13/17: LAD tortuous, no significant disease. RCA dominant and patent. Left circumflex with 90% proximal lesion, 95% mid lesion and distal thrombus. Successful NERI ×2 left circumflex.    Labs 4/19/2019: Occluded right MICHAEL at origin with collaterals. Patent left MICHAEL and CFA    EKG  Assessment/Plan     1. CAD in native artery  Mirela had circumflex disease before age 60 with non-ST  segment elevation myocardial infarction and successful NERI in the circumflex.  She needs aggressive risk factor modification.  We discussed the fact that her diabetes is not well controlled.  She needs weight loss and exercise, but admits that she finds this difficult.  Her son is apparently going to be getting a physical fitness patch as part of his extracurricular activities and she is going to try to work with him doing this.  She should continue Toprol- mg daily, lisinopril 10 mg daily, aspirin 81 mg daily and atorvastatin 80 mg daily.  Intensive lipid-lowering goal.    2. Tachycardia  Heart rate today is approximately 90 bpm.  I do think she has an element of anxiety.  She has no documented arrhythmia.    3. Abnormal EKG  EKG is abnormal but unchanged.  Continue current therapy.    4. Claudication (CMS/HCC) (HCC)  She has claudication at fairly low levels of exercise despite the fact that her PVRs and segmental pressures are normal.  This may reflect the fact that she has common iliac disease.  Again, we spoke about the importance of increasing her physical activity and exercise and try to increase her walking distance.  She should continue on antiplatelet therapy and abstain from smoking.    5. Tobacco use  Recommended continued complete tobacco cessation and congratulated her on the fact that she has maintained this since 2017.    6. Obesity with serious comorbidity, unspecified classification, unspecified obesity type  The patient was counseled about potential long-term consequences of obesity including hypertension, heart disease, heart failure, pulmonary hypertension and joint/mobility problems.  Patient was encouraged to increase physical activity, including low impact exercise if necessary, follow a prudent diet and consider participating in a structured weight loss program    7. Essential hypertension  Continue lisinopril 10 mg daily and Toprol- mg daily.  Again, she would benefit from  weight loss.  Low-salt diet.  We discussed the possibility of sleep apnea previously, but she does not wish to pursue any additional evaluation right now    Return in about 6 months (around 11/14/2019) for next scheduled follow-up, earlier with any change in symptoms.  I have reviewed the patient's medical history in detail and updated the computerized patient record.  Thank you for allowing me to participate in the care of this patient.  I hope this information is helpful.    Zara Mccloud MD   5/15/2019  11:33 AM

## 2019-07-17 ENCOUNTER — OFFICE VISIT (OUTPATIENT)
Dept: VASCULAR SURGERY | Facility: CLINIC | Age: 54
End: 2019-07-17
Payer: COMMERCIAL

## 2019-07-17 ENCOUNTER — HOSPITAL ENCOUNTER (OUTPATIENT)
Dept: CARDIOLOGY | Facility: CLINIC | Age: 54
Setting detail: NUCLEAR MEDICINE
Discharge: HOME | End: 2019-07-17
Attending: INTERNAL MEDICINE
Payer: COMMERCIAL

## 2019-07-17 VITALS — HEIGHT: 64 IN | WEIGHT: 239.4 LBS | BODY MASS INDEX: 40.87 KG/M2

## 2019-07-17 VITALS — DIASTOLIC BLOOD PRESSURE: 70 MMHG | SYSTOLIC BLOOD PRESSURE: 109 MMHG

## 2019-07-17 DIAGNOSIS — I73.9 CLAUDICATION (CMS/HCC): Primary | ICD-10-CM

## 2019-07-17 DIAGNOSIS — I73.9 CLAUDICATION (CMS/HCC): ICD-10-CM

## 2019-07-17 PROCEDURE — 93925 LOWER EXTREMITY STUDY: CPT | Performed by: SURGERY

## 2019-07-17 PROCEDURE — 99214 OFFICE O/P EST MOD 30 MIN: CPT | Performed by: SURGERY

## 2019-07-17 NOTE — PATIENT INSTRUCTIONS
Mirela Lopez    Your doctor is requesting that you follow up in the office  in 1 year after repeat ultrasound.  You will be notified by the office to remind you of your appointment.

## 2019-07-17 NOTE — PROGRESS NOTES
"     [facility]  Follow-up Office Note  @ [facility]        DETAILS OF CONSULTATION   2019  Mirela Lopez               YOB: 1965  [id number]    Consulting Service:  [facility]    PCP:  Semaj Reina    Diagnosis:  PAD     HISTORY OF PRESENT ILLNESS        Mirela Lopez  is a 54 y.o. female returning to the office for continued management of PAD,   Sp bilateral \"kissing\" stents in Oct of 2017 (she had MI with PCI in Aug of 2017), prior to that was a heavy smoker with very limiting claudication pain, now is non-smoker and claudication free.  Continues to feel well, here for routine vascular follow-up, she is on ASA / Lipitor.      PAST MEDICAL AND SURGICAL HISTORY        Past Medical History:   Diagnosis Date   • Coronary artery disease    • GERD (gastroesophageal reflux disease)    • Hyperlipidemia    • Hypertension    • Impaired fasting glucose    • Myocardial infarction (CMS/[facility]) ([facility])     NSTEMI with stent left circumflex   • Obesity    • Peripheral vascular disease (CMS/[facility]) ([facility])     Right iliac claudication.  Aortogram demonstrated near occlusion of the right common iliac.  Successful right iliac stent 2017       Past Surgical History:   Procedure Laterality Date   • CARDIAC CATHETERIZATION      LAD tortuous, no significant disease.  Dominant RCA-patent.  90% proximal cx, 95% mid cx and distal thrombus   • CARPAL TUNNEL RELEASE     •  SECTION     • CORONARY STENT PLACEMENT  2017    NERI ×2 left circumflex-performed in [place]   • ILIAC ARTERY STENT  10/2017    Dr. Ugalde       MEDICATIONS        Current Outpatient Prescriptions on File Prior to Visit   Medication Sig Dispense Refill   • aspirin 81 mg enteric coated tablet Take 81 mg by mouth daily.     • atorvastatin (LIPITOR) 80 mg tablet Take 1 tablet (80 mg total) by mouth daily. 30 tablet 5   • ferrous sulfate 325 mg (65 mg iron) tablet Take 1 " tablet by mouth 3 (three) times a day.  6   • lisinopril (PRINIVIL) 10 mg tablet Take 1 tablet (10 mg total) by mouth daily. 30 tablet 5   • metFORMIN XR (GLUCOPHAGE-XR) 750 mg 24 hr tablet Take 2 tablets (1,500 mg total) by mouth daily. With evening meal.     • metoprolol succinate XL (TOPROL-XL) 100 mg 24 hr tablet Take 1 tablet (100 mg total) by mouth daily. 30 tablet 5   • nitroglycerin (NITROSTAT) 0.4 mg SL tablet Place 1 tablet (0.4 mg total) under the tongue every 5 (five) minutes as needed for chest pain. 25 tablet 3     No current facility-administered medications on file prior to visit.        ALLERGIES        Adhesive and Adhesive tape-silicones     PHYSICAL EXAMINATION      /70   There is no height or weight on file to calculate BMI.      Physical Exam   Constitutional: She is oriented to person, place, and time. She appears well-developed and well-nourished.   HENT:   Head: Normocephalic and atraumatic.   Eyes: Pupils are equal, round, and reactive to light. EOM are normal.   Neck: Normal range of motion. Neck supple. No tracheal deviation present.   Cardiovascular: Normal rate.    Pulses:       Dorsalis pedis pulses are 2+ on the right side, and 1+ on the left side.   Pulmonary/Chest: Effort normal and breath sounds normal.   Abdominal: Soft. She exhibits no mass.   Musculoskeletal: Normal range of motion.   Feet:   Right Foot:   Skin Integrity: Negative for ulcer.   Left Foot:   Skin Integrity: Negative for ulcer.   Neurological: She is alert and oriented to person, place, and time.   Skin: Skin is warm and dry.   Psychiatric: She has a normal mood and affect.   Vitals reviewed.      LABS / IMAGING / EKG        Imaging  I have independently reviewed the pertinent imaging from the last 24 hrs.    Bilateral arterial duplexes were reviewed today in the office.      ASSESSMENT AND PLAN         Problem List Items Addressed This Visit        Nervous    Claudication (CMS/HCC) (HCC) - Primary     Overview     53-year-old former smoker had significant intermittent claudication, status post bilateral stenting, her right common iliac was completely occluded, status post procedure has had no residual symptoms, excellent clinical result.  Noninvasive vascular tests performed today for surveillance,         Current Assessment & Plan     Plan for continued antiplatelet and statin therapy.  Healthy lifestyle, weight loss, exercise, cardiovascular healthy diet was discussed as well.  She continues to be a non-smoker which is excellent.  We will continue to see her at regular intervals, based on exam and imaging her iliac stent continues to be patent and perform well.         Relevant Orders    Ultrasound arterial leg bilateral             Return in about 1 year (around 7/17/2020) for Recheck, To review ordered studies.     Sandip Ugalde MD, FACS      Vascular and Endovascular Specialist  [place] [address]  Phone [phone]  Fax  [phone]     Thank you very much for allowing us to participate in the care of your patient.  Please not hesitate to call or email if there are any questions.  I can be reached at [phone] (mobile) or [email or web address].  Sincerely.

## 2019-07-17 NOTE — LETTER
"July 18, 2019     Semaj Reina  [address]  [address]  [place] [address]    Patient: Mirela Lopez  YOB: 1965  Date of Visit: 7/17/2019      Dear Dr. Reina:    Thank you for referring Mirela Lopez to me for evaluation. Below are my notes for this consultation.    If you have questions, please do not hesitate to call me. I look forward to following your patient along with you.         Sincerely,        Sandip Ugalde MD        CC: No Recipients  Sandip Ugalde MD  7/18/2019  7:09 AM  Signed       [facility]  Follow-up Office Note  @ [facility]        DETAILS OF CONSULTATION   7/18/2019  Mirela Lopez               YOB: 1965  [id number]    Consulting Service:  [facility]    PCP:  Semaj Reina    Diagnosis:  PAD     HISTORY OF PRESENT ILLNESS        Mirela Lopez  is a 54 y.o. female returning to the office for continued management of PAD,   Sp bilateral \"kissing\" stents in Oct of 2017 (she had MI with PCI in Aug of 2017), prior to that was a heavy smoker with very limiting claudication pain, now is non-smoker and claudication free.  Continues to feel well, here for routine vascular follow-up, she is on ASA / Lipitor.      PAST MEDICAL AND SURGICAL HISTORY        Past Medical History:   Diagnosis Date   • Coronary artery disease    • GERD (gastroesophageal reflux disease)    • Hyperlipidemia    • Hypertension    • Impaired fasting glucose    • Myocardial infarction (CMS/HCC) ([facility]) 2017    NSTEMI with stent left circumflex   • Obesity    • Peripheral vascular disease (CMS/HCC) ([facility]) 2017    Right iliac claudication.  Aortogram demonstrated near occlusion of the right common iliac.  Successful right iliac stent October 2017       Past Surgical History:   Procedure Laterality Date   • CARDIAC CATHETERIZATION  2017    LAD tortuous, no significant disease.  Dominant RCA-patent.  90% proximal cx, 95% mid cx and distal " thrombus   • CARPAL TUNNEL RELEASE     •  SECTION     • CORONARY STENT PLACEMENT  2017    NERI ×2 left circumflex-performed in [place]   • ILIAC ARTERY STENT  10/2017    Dr. Ugalde       MEDICATIONS        Current Outpatient Prescriptions on File Prior to Visit   Medication Sig Dispense Refill   • aspirin 81 mg enteric coated tablet Take 81 mg by mouth daily.     • atorvastatin (LIPITOR) 80 mg tablet Take 1 tablet (80 mg total) by mouth daily. 30 tablet 5   • ferrous sulfate 325 mg (65 mg iron) tablet Take 1 tablet by mouth 3 (three) times a day.  6   • lisinopril (PRINIVIL) 10 mg tablet Take 1 tablet (10 mg total) by mouth daily. 30 tablet 5   • metFORMIN XR (GLUCOPHAGE-XR) 750 mg 24 hr tablet Take 2 tablets (1,500 mg total) by mouth daily. With evening meal.     • metoprolol succinate XL (TOPROL-XL) 100 mg 24 hr tablet Take 1 tablet (100 mg total) by mouth daily. 30 tablet 5   • nitroglycerin (NITROSTAT) 0.4 mg SL tablet Place 1 tablet (0.4 mg total) under the tongue every 5 (five) minutes as needed for chest pain. 25 tablet 3     No current facility-administered medications on file prior to visit.        ALLERGIES        Adhesive and Adhesive tape-silicones     PHYSICAL EXAMINATION      /70   There is no height or weight on file to calculate BMI.      Physical Exam   Constitutional: She is oriented to person, place, and time. She appears well-developed and well-nourished.   HENT:   Head: Normocephalic and atraumatic.   Eyes: Pupils are equal, round, and reactive to light. EOM are normal.   Neck: Normal range of motion. Neck supple. No tracheal deviation present.   Cardiovascular: Normal rate.    Pulses:       Dorsalis pedis pulses are 2+ on the right side, and 1+ on the left side.   Pulmonary/Chest: Effort normal and breath sounds normal.   Abdominal: Soft. She exhibits no mass.   Musculoskeletal: Normal range of motion.   Feet:   Right Foot:   Skin Integrity: Negative for ulcer.   Left Foot:    Skin Integrity: Negative for ulcer.   Neurological: She is alert and oriented to person, place, and time.   Skin: Skin is warm and dry.   Psychiatric: She has a normal mood and affect.   Vitals reviewed.      LABS / IMAGING / EKG        Imaging  I have independently reviewed the pertinent imaging from the last 24 hrs.    Bilateral arterial duplexes were reviewed today in the office.      ASSESSMENT AND PLAN         Problem List Items Addressed This Visit        Nervous    Claudication (CMS/HCC) (HCC) - Primary    Overview     53-year-old former smoker had significant intermittent claudication, status post bilateral stenting, her right common iliac was completely occluded, status post procedure has had no residual symptoms, excellent clinical result.  Noninvasive vascular tests performed today for surveillance,         Current Assessment & Plan     Plan for continued antiplatelet and statin therapy.  Healthy lifestyle, weight loss, exercise, cardiovascular healthy diet was discussed as well.  She continues to be a non-smoker which is excellent.  We will continue to see her at regular intervals, based on exam and imaging her iliac stent continues to be patent and perform well.         Relevant Orders    Ultrasound arterial leg bilateral             Return in about 1 year (around 7/17/2020) for Recheck, To review ordered studies.     Sandip Ugalde MD, FACS      Vascular and Endovascular Specialist  [place] [address]  Phone [phone]  Fax  [phone]     Thank you very much for allowing us to participate in the care of your patient.  Please not hesitate to call or email if there are any questions.  I can be reached at [phone] (mobile) or [email or web address].  Sincerely.

## 2019-07-18 NOTE — ASSESSMENT & PLAN NOTE
Plan for continued antiplatelet and statin therapy.  Healthy lifestyle, weight loss, exercise, cardiovascular healthy diet was discussed as well.  She continues to be a non-smoker which is excellent.  We will continue to see her at regular intervals, based on exam and imaging her iliac stent continues to be patent and perform well.

## 2019-07-20 LAB
BSA FOR ECHO PROCEDURE: 2.21 M2
LT CFA DIST PSV: 179 CM/S
LT DORSALIS PEDIS PROX PSV: 47.4 CM/S
LT EXT ILIAC DISTAL PSV: 76 CM/S
LT POPLITEAL DIST PSV: 53.6 CM/S
LT POPLITEAL DIST RATIO: 1.01
LT POPLITEAL MID PSV: 53.1 CM/S
LT POPLITEAL MID RATIO: 1.13
LT POPLITEAL PROX PSV: 47 CM/S
LT POPLITEAL PROX RATIO: 0.56
LT POST TIBIAL DIST PSV: 55.3 CM/S
LT PROFUNDA PROX PSV: 101 CM/S
LT PROFUNDA PROX RATIO: 0.56
LT SFA DIST PSV: 83.2 CM/S
LT SFA MID PSV: 91.3 CM/S
LT SFA PROX PSV: 111 CM/S
LT SFA PROX RATIO: 0.62
RT CFA DIST PSV: 118 CM/S
RT DORSALIS PEDIS PROX PSV: 77.8 CM/S
RT POPLITEAL DIST PSV: 75 CM/S
RT POPLITEAL DIST RATIO: 0.78
RT POPLITEAL MID PSV: 96.7 CM/S
RT POPLITEAL MID RATIO: 1.35
RT POPLITEAL PROX PSV: 71.5 CM/S
RT POPLITEAL PROX RATIO: 0.49
RT POST TIBIAL DIST PSV: 86.2 CM/S
RT PROFUNDA PROX PSV: 186 CM/S
RT PROFUNDA PROX RATIO: 1.58
RT SFA DIST PSV: 146 CM/S
RT SFA MID PSV: 158 CM/S
RT SFA PROX PSV: 127 CM/S
RT SFA PROX RATIO: 1.08

## 2019-08-19 ENCOUNTER — APPOINTMENT (RX ONLY)
Dept: URBAN - METROPOLITAN AREA CLINIC 31 | Facility: CLINIC | Age: 54
Setting detail: DERMATOLOGY
End: 2019-08-19

## 2019-08-19 DIAGNOSIS — D22 MELANOCYTIC NEVI: ICD-10-CM

## 2019-08-19 DIAGNOSIS — L82.0 INFLAMED SEBORRHEIC KERATOSIS: ICD-10-CM

## 2019-08-19 PROBLEM — E13.9 OTHER SPECIFIED DIABETES MELLITUS WITHOUT COMPLICATIONS: Status: ACTIVE | Noted: 2019-08-19

## 2019-08-19 PROBLEM — E78.5 HYPERLIPIDEMIA, UNSPECIFIED: Status: ACTIVE | Noted: 2019-08-19

## 2019-08-19 PROBLEM — D22.39 MELANOCYTIC NEVI OF OTHER PARTS OF FACE: Status: ACTIVE | Noted: 2019-08-19

## 2019-08-19 PROBLEM — D48.5 NEOPLASM OF UNCERTAIN BEHAVIOR OF SKIN: Status: ACTIVE | Noted: 2019-08-19

## 2019-08-19 PROBLEM — I10 ESSENTIAL (PRIMARY) HYPERTENSION: Status: ACTIVE | Noted: 2019-08-19

## 2019-08-19 PROCEDURE — 11102 TANGNTL BX SKIN SINGLE LES: CPT

## 2019-08-19 PROCEDURE — ? BIOPSY BY SHAVE METHOD

## 2019-08-19 PROCEDURE — ? COUNSELING

## 2019-08-19 PROCEDURE — 99202 OFFICE O/P NEW SF 15 MIN: CPT | Mod: 25

## 2019-08-19 ASSESSMENT — LOCATION DETAILED DESCRIPTION DERM
LOCATION DETAILED: LEFT LATERAL MALAR CHEEK
LOCATION DETAILED: RIGHT MEDIAL MALAR CHEEK

## 2019-08-19 ASSESSMENT — LOCATION ZONE DERM: LOCATION ZONE: FACE

## 2019-08-19 ASSESSMENT — LOCATION SIMPLE DESCRIPTION DERM
LOCATION SIMPLE: LEFT CHEEK
LOCATION SIMPLE: RIGHT CHEEK

## 2019-08-19 NOTE — PROCEDURE: BIOPSY BY SHAVE METHOD
Biopsy Type: H and E
Consent: Written consent was obtained and risks were reviewed including but not limited to scarring, infection, bleeding, scabbing, incomplete removal, nerve damage and allergy to anesthesia.
Depth Of Biopsy: dermis
X Size Of Lesion In Cm: 0
Anesthesia Volume In Cc (Will Not Render If 0): 0.3
Type Of Destruction Used: Curettage
Electrodesiccation And Curettage Text: The wound bed was treated with electrodesiccation and curettage after the biopsy was performed.
Dressing: bandage
Render In Bullet Format When Appropriate: No
Billing Type: Third-Party Bill
Biopsy Method: Personna blade
Hemostasis: Aluminum Chloride
Was A Bandage Applied: Yes
Silver Nitrate Text: The wound bed was treated with silver nitrate after the biopsy was performed.
Lab: -20
Curettage Text: The wound bed was treated with curettage after the biopsy was performed.
Detail Level: Detailed
Anesthesia Type: 1% lidocaine with epinephrine and a 1:10 solution of 8.4% sodium bicarbonate
Post-Care Instructions: I reviewed with the patient in detail post-care instructions. Patient is to keep the biopsy site dry overnight, and then apply bacitracin twice daily until healed. Patient may apply hydrogen peroxide soaks to remove any crusting.
Cryotherapy Text: The wound bed was treated with cryotherapy after the biopsy was performed.
Electrodesiccation Text: The wound bed was treated with electrodesiccation after the biopsy was performed.
Notification Instructions: Patient will be notified of biopsy results. However, patient instructed to call the office if not contacted within 2 weeks.
Wound Care: Petrolatum

## 2019-08-19 NOTE — HPI: SKIN LESION
What Type Of Note Output Would You Prefer (Optional)?: Standard Output
How Severe Is Your Skin Lesion?: mild
Has Your Skin Lesion Been Treated?: not been treated
Is This A New Presentation, Or A Follow-Up?: Skin Lesion
Additional History: Patient has a growth on her left cheek for a couple of months that has gotten slightly larger.

## 2019-11-18 ENCOUNTER — OFFICE VISIT (OUTPATIENT)
Dept: CARDIOLOGY | Facility: CLINIC | Age: 54
End: 2019-11-18
Payer: COMMERCIAL

## 2019-11-18 VITALS
DIASTOLIC BLOOD PRESSURE: 80 MMHG | RESPIRATION RATE: 16 BRPM | HEIGHT: 64 IN | HEART RATE: 95 BPM | BODY MASS INDEX: 41.21 KG/M2 | SYSTOLIC BLOOD PRESSURE: 140 MMHG | WEIGHT: 241.4 LBS

## 2019-11-18 DIAGNOSIS — R94.31 ABNORMAL EKG: ICD-10-CM

## 2019-11-18 DIAGNOSIS — I73.9 PERIPHERAL VASCULAR DISEASE (CMS/HCC): ICD-10-CM

## 2019-11-18 DIAGNOSIS — I10 ESSENTIAL HYPERTENSION: Primary | ICD-10-CM

## 2019-11-18 DIAGNOSIS — E66.9 OBESITY WITH SERIOUS COMORBIDITY, UNSPECIFIED CLASSIFICATION, UNSPECIFIED OBESITY TYPE: ICD-10-CM

## 2019-11-18 DIAGNOSIS — I25.10 CORONARY ARTERY DISEASE INVOLVING NATIVE CORONARY ARTERY OF NATIVE HEART WITHOUT ANGINA PECTORIS: ICD-10-CM

## 2019-11-18 PROCEDURE — 99214 OFFICE O/P EST MOD 30 MIN: CPT | Performed by: INTERNAL MEDICINE

## 2019-11-18 PROCEDURE — 93000 ELECTROCARDIOGRAM COMPLETE: CPT | Performed by: INTERNAL MEDICINE

## 2019-11-18 RX ORDER — METOPROLOL SUCCINATE 100 MG/1
100 TABLET, EXTENDED RELEASE ORAL DAILY
Qty: 30 TABLET | Refills: 5 | Status: SHIPPED | OUTPATIENT
Start: 2019-11-18 | End: 2020-08-17 | Stop reason: SDUPTHER

## 2019-11-18 RX ORDER — LISINOPRIL 10 MG/1
10 TABLET ORAL DAILY
Qty: 30 TABLET | Refills: 5 | Status: SHIPPED | OUTPATIENT
Start: 2019-11-18 | End: 2020-08-17 | Stop reason: SDUPTHER

## 2019-11-18 RX ORDER — ATORVASTATIN CALCIUM 80 MG/1
80 TABLET, FILM COATED ORAL DAILY
Qty: 30 TABLET | Refills: 5 | Status: SHIPPED | OUTPATIENT
Start: 2019-11-18 | End: 2020-08-17 | Stop reason: SDUPTHER

## 2019-11-18 NOTE — LETTER
November 26, 2019     Semaj Reina  [address] [address]    Patient: Mirela Lopez  YOB: 1965  Date of Visit: 11/18/2019      Dear Dr. Reina:    Thank you for referring Mirela Lopez to me for evaluation. Below are my notes for this consultation.    If you have questions, please do not hesitate to call me. I look forward to following your patient along with you.         Sincerely,        Zara Mccloud MD        CC: MD Oneyda Naik Erin, MD  11/26/2019  5:26 PM  Signed       Cardiology Office Visit     Patient ID: Mirela Lopez 54 y.o. female 1965  PCP: Smeaj Reina   History Present Illness     Mirela Lopez returns to the office today for follow-up of her CAD status post non-ST segment elevation myocardial infarction while on vacation in [place] in August 2017.  At that time she was found to have a 95% mid and distal circumflex stenosis with thrombus as well as a 90% proximal circumflex lesion.  She underwent successful NERI x2.  The remainder of her vessels did not demonstrate any significant stenosis, but an aortogram was reported as an occluded right common iliac artery.  Subsequent evaluation of her peripheral circulation showed near occlusion but she still had some patency and underwent a successful right common iliac stents in October 2017 with Dr. Ugalde.  Fortunately, Mirela stoped smoking and continues to abstain from tobacco, but has not made much progress with weight loss.  She denies any exertional chest pain or symptoms of her presenting angina.  Mirela denies PND or orthopnea.  Her claudication is not much of an issue at this time.    Past History     Past Medical History:   Diagnosis Date   • Coronary artery disease    • GERD (gastroesophageal reflux disease)    • Hyperlipidemia    • Hypertension    • Impaired fasting glucose    • Myocardial infarction (CMS/[facility]) 2017    NSTEMI with stent left circumflex   • Obesity    • Peripheral  vascular disease (CMS/HCC) 2017    Right iliac claudication.  Aortogram demonstrated near occlusion of the right common iliac.  Successful right iliac stent 2017     Past Surgical History:   Procedure Laterality Date   • CARDIAC CATHETERIZATION      LAD tortuous, no significant disease.  Dominant RCA-patent.  90% proximal cx, 95% mid cx and distal thrombus   • CARPAL TUNNEL RELEASE     •  SECTION     • CORONARY STENT PLACEMENT      NERI ×2 left circumflex-performed in [place]   • ILIAC ARTERY STENT  10/2017    Dr. Ugalde     Social History:  reports that she quit smoking about 2 years ago. She has never used smokeless tobacco. She reports that she drinks alcohol. She reports that she does not use drugs.    Family History: family history includes Diabetes type II in her maternal grandmother; Heart disease in her biological father; Hyperlipidemia in her biological father; Lung cancer in her biological mother.  Allergies/Medications   Allergies:Adhesive and Adhesive tape-silicones    Medications:  Outpatient Encounter Medications as of 2019:   •  aspirin 81 mg enteric coated tablet, Take 81 mg by mouth daily.  •  atorvastatin (LIPITOR) 80 mg tablet, Take 1 tablet (80 mg total) by mouth daily.  •  ferrous sulfate 134 mg (27 mg iron) tablet, Take 1 tablet by mouth daily with breakfast.    •  lisinopril (PRINIVIL) 10 mg tablet, Take 1 tablet (10 mg total) by mouth daily.  •  metFORMIN XR (GLUCOPHAGE-XR) 750 mg 24 hr tablet, Take 2 tablets (1,500 mg total) by mouth daily. With evening meal.  •  metoprolol succinate XL (TOPROL-XL) 100 mg 24 hr tablet, Take 1 tablet (100 mg total) by mouth daily.  •  nitroglycerin (NITROSTAT) 0.4 mg SL tablet, Place 1 tablet (0.4 mg total) under the tongue every 5 (five) minutes as needed for chest pain.    ROS/Physical Exam   ROS  Pertinent items are noted in HPI.  Comprehensive (12+ point) ROS otherwise negative.  Vitals:    19 1045   BP: 140/80   BP  "Location: Left upper arm   Patient Position: Sitting   Pulse: 95   Resp: 16   Weight: 109 kg (241 lb 6.4 oz)   Height: 1.626 m (5' 4\")     BP Readings from Last 3 Encounters:   11/18/19 140/80   07/17/19 109/70   05/14/19 120/84     Wt Readings from Last 3 Encounters:   11/18/19 109 kg (241 lb 6.4 oz)   07/17/19 109 kg (239 lb 6.4 oz)   05/14/19 110 kg (242 lb)     Physical Exam   Constitutional: She is oriented to person, place, and time. She appears well-developed. She is cooperative.   Obese white female in no apparent distress   HENT:   Head: Normocephalic and atraumatic.   Mouth/Throat: Oropharynx is clear and moist and mucous membranes are normal.   Eyes: Conjunctivae and EOM are normal. No scleral icterus.   Neck: Trachea normal and normal range of motion. Neck supple. No hepatojugular reflux and no JVD present. Carotid bruit is not present. No thyromegaly present.   Cardiovascular: Normal rate, regular rhythm, S1 normal, S2 normal and normal heart sounds. Exam reveals decreased pulses. Exam reveals no gallop, no S3, no S4 and no friction rub.   Pulses:       Radial pulses are 2+ on the right side, and 2+ on the left side.        Posterior tibial pulses are 1+ on the right side, and 1+ on the left side.   There is a 1/6 systolic murmur at the left sternal border.   Pulmonary/Chest: Effort normal and breath sounds normal. She has no wheezes. She has no rhonchi. She has no rales.   Abdominal: Soft. Bowel sounds are normal. She exhibits no abdominal bruit. There is no tenderness.   Obese abdomen   Musculoskeletal: Normal range of motion. She exhibits no tenderness or deformity.   Neurological: She is alert and oriented to person, place, and time. No cranial nerve deficit.   Skin: Skin is warm, dry and intact. No rash noted. No cyanosis. Nails show no clubbing.   Psychiatric: She has a normal mood and affect. Her speech is normal and behavior is normal.     Labs/Imaging/Procedures   Labs  April 2019:Glucose 194, " BUN 12, potassium 3.7, creatinine 0.6, hemoglobin 11.8, platelets 184,000, hemoglobin A1c 8.3    Imaging  LE arterial duplex bilateral 7/17/2019: Triphasic and biphasic waveforms throughout the right and left lower extremities.  No evidence of discrete stenosis or obstruction.      Exercise stress echocardiogram 4/19/2019: Very technically difficult study.  Exercise EKG negative for ischemia or arrhythmia.  Poor exercise capacity completing only stage I Abner protocol.  Exercise EKG negative for ischemia or arrhythmia at that workload.  At rest, normal LV size and function with LVEF 60-65%.  Basal mid inferior hypokinesis.  Mildly dilated RV with preserved function.  Trace TR.  Trace MR.  Trileaflet aortic valve.  No significant stenosis or insufficiency.  Prominent pericardial fat pad.  Immediately following exercise overall LV function improves.  Persistent basal inferior hypokinesis consistent with scar.  No ischemia.       PVR 1/9/2019: Right JOURDAN 1.12 at rest and 1.1 following exercise.  Left JOURDAN 0.95 at rest and 0.96 following exercise.  Normal segmental pressures and PVRs.       Periph Intervention 10/2017: Successful right iliac stent.     Abd Ao Duplex 9/2017: Elevated velocities right MICHAEL with monophasic flow.     Echo 9/2017: LVEF 65%, Top normal LV size with overall preserved function. No significant pulmonary hypertension by Doppler. Technically difficult study.     Cath 8/13/17: LAD tortuous, no significant disease. RCA dominant and patent. Left circumflex with 90% proximal lesion, 95% mid lesion and distal thrombus. Successful NERI ×2 left circumflex.     Labs 4/19/2019: Occluded right MICHAEL at origin with collaterals. Patent left MICHAEL and CFA    EKG  Assessment/Plan     1. Essential hypertension  Mirela has a controlled blood pressure, but I would not want to see it any higher.  On recheck in the office it came down to approximately 130/78 mmHg.  She admits to some feelings of anxiety, but she would  certainly benefit from weight loss, which we have discussed.  I also told her to speak with you about the addition of Jardiance, which could also have some effect on her blood pressure.  She is awaiting follow-up blood work.    2. Peripheral vascular disease (CMS/HCC)  We reviewed her recent noninvasive studies.  She follows regularly with Dr. Ugalde.  She should continue to abstain from smoking.  Continue aspirin and atorvastatin.  Intensive lipid-lowering goal.  - atorvastatin (LIPITOR) 80 mg tablet; Take 1 tablet (80 mg total) by mouth daily.  Dispense: 30 tablet; Refill: 5    3. Coronary artery disease involving native coronary artery of native heart without angina pectoris  Continue aspirin, beta-blocker and statin.  LDL should be less than 70 mg/dL.  She would benefit from increased physical activity.  No recurrent anginal symptoms.    4. Abnormal EKG  EKG is abnormal but unchanged.  Continue current therapy.    5. Obesity with serious comorbidity, unspecified classification, unspecified obesity type  The patient was counseled about potential long-term consequences of obesity including hypertension, heart disease, heart failure, pulmonary hypertension and joint/mobility problems.  Patient was encouraged to increase physical activity, including low impact exercise if necessary, follow a prudent diet and consider participating in a structured weight loss program. Strategies for gradual weight loss reviewed.    Return in about 6 months (around 5/18/2020) for next scheduled follow-up, earlier with any change in symptoms.  I have reviewed the patient's medical history in detail and updated the computerized patient record.  Thank you for allowing me to participate in the care of this patient.  I hope this information is helpful.    Zara Mccloud MD [facility]  11/26/2019  5:21 PM  This document was generated utilizing voice recognition technology. Please excuse any typographical errors which may be present.

## 2019-11-26 NOTE — PROGRESS NOTES
Cardiology Office Visit     Patient ID: Mirela Lopez 54 y.o. female 1965  PCP: Semaj Reina   History Present Illness     Mirela Lopez returns to the office today for follow-up of her CAD status post non-ST segment elevation myocardial infarction while on vacation in [place] in 2017.  At that time she was found to have a 95% mid and distal circumflex stenosis with thrombus as well as a 90% proximal circumflex lesion.  She underwent successful NERI x2.  The remainder of her vessels did not demonstrate any significant stenosis, but an aortogram was reported as an occluded right common iliac artery.  Subsequent evaluation of her peripheral circulation showed near occlusion but she still had some patency and underwent a successful right common iliac stents in 2017 with Dr. Ugalde.  Fortunately, Mirela stoped smoking and continues to abstain from tobacco, but has not made much progress with weight loss.  She denies any exertional chest pain or symptoms of her presenting angina.  Mirela denies PND or orthopnea.  Her claudication is not much of an issue at this time.    Past History     Past Medical History:   Diagnosis Date   • Coronary artery disease    • GERD (gastroesophageal reflux disease)    • Hyperlipidemia    • Hypertension    • Impaired fasting glucose    • Myocardial infarction (CMS/[facility]) 2017    NSTEMI with stent left circumflex   • Obesity    • Peripheral vascular disease (CMS/[facility]) 2017    Right iliac claudication.  Aortogram demonstrated near occlusion of the right common iliac.  Successful right iliac stent 2017     Past Surgical History:   Procedure Laterality Date   • CARDIAC CATHETERIZATION      LAD tortuous, no significant disease.  Dominant RCA-patent.  90% proximal cx, 95% mid cx and distal thrombus   • CARPAL TUNNEL RELEASE     •  SECTION     • CORONARY STENT PLACEMENT  2017    NERI ×2 left circumflex-performed in [place]   • ILIAC ARTERY STENT  10/2017  "   Dr. Ugalde     Social History:  reports that she quit smoking about 2 years ago. She has never used smokeless tobacco. She reports that she drinks alcohol. She reports that she does not use drugs.    Family History: family history includes Diabetes type II in her maternal grandmother; Heart disease in her biological father; Hyperlipidemia in her biological father; Lung cancer in her biological mother.  Allergies/Medications   Allergies:Adhesive and Adhesive tape-silicones    Medications:  Outpatient Encounter Medications as of 11/18/2019:   •  aspirin 81 mg enteric coated tablet, Take 81 mg by mouth daily.  •  atorvastatin (LIPITOR) 80 mg tablet, Take 1 tablet (80 mg total) by mouth daily.  •  ferrous sulfate 134 mg (27 mg iron) tablet, Take 1 tablet by mouth daily with breakfast.    •  lisinopril (PRINIVIL) 10 mg tablet, Take 1 tablet (10 mg total) by mouth daily.  •  metFORMIN XR (GLUCOPHAGE-XR) 750 mg 24 hr tablet, Take 2 tablets (1,500 mg total) by mouth daily. With evening meal.  •  metoprolol succinate XL (TOPROL-XL) 100 mg 24 hr tablet, Take 1 tablet (100 mg total) by mouth daily.  •  nitroglycerin (NITROSTAT) 0.4 mg SL tablet, Place 1 tablet (0.4 mg total) under the tongue every 5 (five) minutes as needed for chest pain.    ROS/Physical Exam   ROS  Pertinent items are noted in HPI.  Comprehensive (12+ point) ROS otherwise negative.  Vitals:    11/18/19 1045   BP: 140/80   BP Location: Left upper arm   Patient Position: Sitting   Pulse: 95   Resp: 16   Weight: 109 kg (241 lb 6.4 oz)   Height: 1.626 m (5' 4\")     BP Readings from Last 3 Encounters:   11/18/19 140/80   07/17/19 109/70   05/14/19 120/84     Wt Readings from Last 3 Encounters:   11/18/19 109 kg (241 lb 6.4 oz)   07/17/19 109 kg (239 lb 6.4 oz)   05/14/19 110 kg (242 lb)     Physical Exam   Constitutional: She is oriented to person, place, and time. She appears well-developed. She is cooperative.   Obese white female in no apparent distress "   HENT:   Head: Normocephalic and atraumatic.   Mouth/Throat: Oropharynx is clear and moist and mucous membranes are normal.   Eyes: Conjunctivae and EOM are normal. No scleral icterus.   Neck: Trachea normal and normal range of motion. Neck supple. No hepatojugular reflux and no JVD present. Carotid bruit is not present. No thyromegaly present.   Cardiovascular: Normal rate, regular rhythm, S1 normal, S2 normal and normal heart sounds. Exam reveals decreased pulses. Exam reveals no gallop, no S3, no S4 and no friction rub.   Pulses:       Radial pulses are 2+ on the right side, and 2+ on the left side.        Posterior tibial pulses are 1+ on the right side, and 1+ on the left side.   There is a 1/6 systolic murmur at the left sternal border.   Pulmonary/Chest: Effort normal and breath sounds normal. She has no wheezes. She has no rhonchi. She has no rales.   Abdominal: Soft. Bowel sounds are normal. She exhibits no abdominal bruit. There is no tenderness.   Obese abdomen   Musculoskeletal: Normal range of motion. She exhibits no tenderness or deformity.   Neurological: She is alert and oriented to person, place, and time. No cranial nerve deficit.   Skin: Skin is warm, dry and intact. No rash noted. No cyanosis. Nails show no clubbing.   Psychiatric: She has a normal mood and affect. Her speech is normal and behavior is normal.     Labs/Imaging/Procedures   Labs  April 2019:Glucose 194, BUN 12, potassium 3.7, creatinine 0.6, hemoglobin 11.8, platelets 184,000, hemoglobin A1c 8.3    Imaging  LE arterial duplex bilateral 7/17/2019: Triphasic and biphasic waveforms throughout the right and left lower extremities.  No evidence of discrete stenosis or obstruction.      Exercise stress echocardiogram 4/19/2019: Very technically difficult study.  Exercise EKG negative for ischemia or arrhythmia.  Poor exercise capacity completing only stage I Abner protocol.  Exercise EKG negative for ischemia or arrhythmia at that  workload.  At rest, normal LV size and function with LVEF 60-65%.  Basal mid inferior hypokinesis.  Mildly dilated RV with preserved function.  Trace TR.  Trace MR.  Trileaflet aortic valve.  No significant stenosis or insufficiency.  Prominent pericardial fat pad.  Immediately following exercise overall LV function improves.  Persistent basal inferior hypokinesis consistent with scar.  No ischemia.       PVR 1/9/2019: Right JOURDAN 1.12 at rest and 1.1 following exercise.  Left JOURDAN 0.95 at rest and 0.96 following exercise.  Normal segmental pressures and PVRs.       Periph Intervention 10/2017: Successful right iliac stent.     Abd Ao Duplex 9/2017: Elevated velocities right MICHAEL with monophasic flow.     Echo 9/2017: LVEF 65%, Top normal LV size with overall preserved function. No significant pulmonary hypertension by Doppler. Technically difficult study.     Cath 8/13/17: LAD tortuous, no significant disease. RCA dominant and patent. Left circumflex with 90% proximal lesion, 95% mid lesion and distal thrombus. Successful NERI ×2 left circumflex.     Labs 4/19/2019: Occluded right MICHAEL at origin with collaterals. Patent left MICHAEL and CFA    EKG  Assessment/Plan     1. Essential hypertension  Mirela has a controlled blood pressure, but I would not want to see it any higher.  On recheck in the office it came down to approximately 130/78 mmHg.  She admits to some feelings of anxiety, but she would certainly benefit from weight loss, which we have discussed.  I also told her to speak with you about the addition of Jardiance, which could also have some effect on her blood pressure.  She is awaiting follow-up blood work.    2. Peripheral vascular disease (CMS/HCC)  We reviewed her recent noninvasive studies.  She follows regularly with Dr. Ugalde.  She should continue to abstain from smoking.  Continue aspirin and atorvastatin.  Intensive lipid-lowering goal.  - atorvastatin (LIPITOR) 80 mg tablet; Take 1 tablet (80 mg total)  by mouth daily.  Dispense: 30 tablet; Refill: 5    3. Coronary artery disease involving native coronary artery of native heart without angina pectoris  Continue aspirin, beta-blocker and statin.  LDL should be less than 70 mg/dL.  She would benefit from increased physical activity.  No recurrent anginal symptoms.    4. Abnormal EKG  EKG is abnormal but unchanged.  Continue current therapy.    5. Obesity with serious comorbidity, unspecified classification, unspecified obesity type  The patient was counseled about potential long-term consequences of obesity including hypertension, heart disease, heart failure, pulmonary hypertension and joint/mobility problems.  Patient was encouraged to increase physical activity, including low impact exercise if necessary, follow a prudent diet and consider participating in a structured weight loss program. Strategies for gradual weight loss reviewed.    Return in about 6 months (around 5/18/2020) for next scheduled follow-up, earlier with any change in symptoms.  I have reviewed the patient's medical history in detail and updated the computerized patient record.  Thank you for allowing me to participate in the care of this patient.  I hope this information is helpful.    Zara Mccloud MD [facility]  11/26/2019  5:21 PM  This document was generated utilizing voice recognition technology. Please excuse any typographical errors which may be present.

## 2020-04-09 ENCOUNTER — TELEPHONE (OUTPATIENT)
Dept: CARDIOLOGY | Facility: CLINIC | Age: 55
End: 2020-04-09

## 2020-04-09 NOTE — TELEPHONE ENCOUNTER
RAMSEY HOFFMAN for Mirela that we changed her ov on 4- to a telemed visit. Asked that she call back to confirm  THX

## 2020-04-28 ENCOUNTER — TELEPHONE (OUTPATIENT)
Dept: CARDIOLOGY | Facility: CLINIC | Age: 55
End: 2020-04-28

## 2020-04-29 NOTE — TELEPHONE ENCOUNTER
Pt stated she didn't get a call for her telemed appt,, pt would like a call back she can be reached at [phone]

## 2020-04-29 NOTE — TELEPHONE ENCOUNTER
Lm for Mirela that we tried several time yesterday to reach her, unfortunatly she didn't answer her phone. Asked her to call back and we would set her up for another tele medicine visit.

## 2020-07-22 ENCOUNTER — HOSPITAL ENCOUNTER (OUTPATIENT)
Dept: CARDIOLOGY | Facility: CLINIC | Age: 55
Discharge: HOME | End: 2020-07-22
Attending: SURGERY
Payer: COMMERCIAL

## 2020-07-22 ENCOUNTER — OFFICE VISIT (OUTPATIENT)
Dept: VASCULAR SURGERY | Facility: CLINIC | Age: 55
End: 2020-07-22
Payer: COMMERCIAL

## 2020-07-22 VITALS — HEIGHT: 64 IN | BODY MASS INDEX: 41.15 KG/M2 | WEIGHT: 241 LBS

## 2020-07-22 DIAGNOSIS — I73.9 CLAUDICATION (CMS/HCC): ICD-10-CM

## 2020-07-22 DIAGNOSIS — I73.9 CLAUDICATION (CMS/HCC): Primary | ICD-10-CM

## 2020-07-22 PROBLEM — Z72.0 TOBACCO USE: Status: RESOLVED | Noted: 2017-08-23 | Resolved: 2020-07-22

## 2020-07-22 LAB
BSA FOR ECHO PROCEDURE: 2.22 M2
LT CFA DIST PSV: 135 CM/S
LT DORSALIS PEDIS PROX PSV: 49.2 CM/S
LT POPLITEAL DIST PSV: 45.8 CM/S
LT POPLITEAL PROX PSV: 36.1 CM/S
LT POPLITEAL PROX RATIO: 0.53
LT POST TIBIAL DIST PSV: 56.2 CM/S
LT PROFUNDA PROX PSV: 31.5 CM/S
LT PROFUNDA PROX RATIO: 0.23
LT SFA DIST PSV: 68 CM/S
LT SFA MID PSV: 88.9 CM/S
LT SFA PROX PSV: 82.9 CM/S
LT SFA PROX RATIO: 0.61
RT CFA DIST PSV: 99.6 CM/S
RT POPLITEAL DIST PSV: 76.3 CM/S
RT POPLITEAL PROX PSV: 57.1 CM/S
RT POPLITEAL PROX RATIO: 0.54
RT POST TIBIAL DIST PSV: 106 CM/S
RT PROFUNDA PROX PSV: 150 CM/S
RT PROFUNDA PROX RATIO: 1.51
RT SFA DIST PSV: 105 CM/S
RT SFA MID PSV: 131 CM/S
RT SFA PROX PSV: 106 CM/S
RT SFA PROX RATIO: 1.06

## 2020-07-22 PROCEDURE — 93925 LOWER EXTREMITY STUDY: CPT | Performed by: SURGERY

## 2020-07-22 PROCEDURE — 99214 OFFICE O/P EST MOD 30 MIN: CPT | Performed by: SURGERY

## 2020-07-22 NOTE — ASSESSMENT & PLAN NOTE
Continue aspirin therapy.  Continue statin therapy.  We will make arrangements for a one-year follow-up with PVR.

## 2020-07-22 NOTE — LETTER
July 22, 2020     Semaj Reina MD  [address] [address]    Patient: Mirela Lopez  YOB: 1965  Date of Visit: 7/22/2020      Dear Dr. Reina:    Thank you for referring Mirela Lopez to me for evaluation. Below are my notes for this consultation.    If you have questions, please do not hesitate to call me. I look forward to following your patient along with you.         Sincerely,        Sandip Ugalde MD        CC: No Recipients  Sandip Ugalde MD  7/22/2020  3:16 PM  Signed       [facility]  Follow-up Office Note  @ [facility]        DETAILS OF CONSULTATION   7/22/2020  Mirela Lopez               YOB: 1965  [id number]    Consulting Service:  [facility] IN [facility]    PCP:  Semaj Reina MD    Diagnosis: PAD     HISTORY OF PRESENT ILLNESS        Mirela Lopez  is a 55 y.o. female returning to the office for continued management of PAD, Mirela is following up after kissing iliac stent placement in 2017.  She was previously a heavy smoker but has been a non-smoker for the last 3 years or more.  She continues to be claudication free, she is here for routine vascular follow-up.    She does continue to be compliant with her home medication including aspirin and Lipitor.    She did have a PVR study which we reviewed together today.      PAST MEDICAL AND SURGICAL HISTORY        Past Medical History:   Diagnosis Date   • Coronary artery disease    • GERD (gastroesophageal reflux disease)    • Hyperlipidemia    • Hypertension    • Impaired fasting glucose    • Myocardial infarction (CMS/[facility]) 2017    NSTEMI with stent left circumflex   • Obesity    • Peripheral vascular disease (CMS/[facility]) 2017    Right iliac claudication.  Aortogram demonstrated near occlusion of the right common iliac.  Successful right iliac stent October 2017       Past Surgical History:   Procedure Laterality Date   • CARDIAC CATHETERIZATION       LAD tortuous, no significant disease.  Dominant RCA-patent.  90% proximal cx, 95% mid cx and distal thrombus   • CARPAL TUNNEL RELEASE     •  SECTION     • CORONARY STENT PLACEMENT  2017    NERI ×2 left circumflex-performed in [place]   • ILIAC ARTERY STENT  10/2017    Dr. Ugalde       MEDICATIONS        Current Outpatient Medications on File Prior to Visit   Medication Sig Dispense Refill   • aspirin 81 mg enteric coated tablet Take 81 mg by mouth daily.     • atorvastatin (LIPITOR) 80 mg tablet Take 1 tablet (80 mg total) by mouth daily. 30 tablet 5   • ferrous sulfate 134 mg (27 mg iron) tablet Take 1 tablet by mouth daily with breakfast.    6   • lisinopril (PRINIVIL) 10 mg tablet Take 1 tablet (10 mg total) by mouth daily. 30 tablet 5   • metFORMIN XR (GLUCOPHAGE-XR) 750 mg 24 hr tablet Take 2 tablets (1,500 mg total) by mouth daily. With evening meal.     • metoprolol succinate XL (TOPROL-XL) 100 mg 24 hr tablet Take 1 tablet (100 mg total) by mouth daily. 30 tablet 5   • nitroglycerin (NITROSTAT) 0.4 mg SL tablet Place 1 tablet (0.4 mg total) under the tongue every 5 (five) minutes as needed for chest pain. 25 tablet 3     No current facility-administered medications on file prior to visit.        ALLERGIES        Adhesive and Adhesive tape-silicones     PHYSICAL EXAMINATION      There were no vitals taken for this visit.  There is no height or weight on file to calculate BMI.      Physical Exam   Constitutional: She is oriented to person, place, and time. She appears well-developed and well-nourished.   HENT:   Head: Normocephalic and atraumatic.   Eyes: Pupils are equal, round, and reactive to light. EOM are normal.   Neck: Normal range of motion. Neck supple. No tracheal deviation present.   Cardiovascular: Normal rate.   Pulses:       Dorsalis pedis pulses are 2+ on the right side, and 2+ on the left side.   Pulmonary/Chest: Effort normal and breath sounds normal.   Abdominal: Soft. She  exhibits no mass.   Musculoskeletal: Normal range of motion.   Feet:   Right Foot:   Skin Integrity: Negative for ulcer.   Left Foot:   Skin Integrity: Negative for ulcer.   Neurological: She is alert and oriented to person, place, and time.   Skin: Skin is warm and dry.   Psychiatric: She has a normal mood and affect.   Vitals reviewed.      LABS / IMAGING / EKG        Imaging  I have independently reviewed the pertinent imaging from the last 24 hrs. and Significant findings include:     Lower extremity arterial performed today July 22 thousand 20    Study Findings and Details     Study Details Arterial duplex examination of the bilateral lower extremities using B-mode, color Doppler and spectral Doppler.   Vascular Findings     Right Lower Arterial The right distal common femoral artery has triphasic flow.   The right proximal femoral artery has triphasic flow.   The right mid femoral artery has triphasic flow.   The right distal femoral artery has triphasic flow.   The right proximal popliteal artery has triphasic flow.   The right mid popliteal artery has triphasic flow.   The right distal popliteal artery has triphasic flow.   The right distal posterior tibial artery has triphasic flow.   The right proximal dorsalis pedis artery has triphasic flow.   Left Lower Arterial The left distal common femoral artery has biphasic flow.   The left proximal profunda artery has monophasic flow.   The left proximal femoral artery has biphasic flow.   The left mid femoral artery has biphasic flow.   The left distal femoral artery has biphasic flow.   The left proximal popliteal artery has biphasic flow.   The left distal popliteal artery has biphasic flow.   The left distal posterior tibial artery has biphasic flow.   The left proximal dorsalis pedis artery has biphasic flow.   Right Lower Arterial Measurements     Upper Leg PSV (cm/s) Ratio   CFA Dist 99.6 cm/s             Profunda Prox 150 cm/s       1.51          FA Prox  106 cm/s       1.06          FA Mid 131 cm/s             FA Dist 105 cm/s             Popliteal Prox 57.1 cm/s       0.54          Popliteal Dist 76.3 cm/s       1.34             Lower Leg PSV (cm/s) Ratio   Post Tibial Dist 106 cm/s       1.39                      Left Lower Arterial Measurements     Upper Leg PSV (cm/s) Ratio   CFA Dist 135 cm/s             Profunda Prox 31.5 cm/s       0.23          FA Prox 82.9 cm/s       0.61          FA Mid 88.9 cm/s       1.07          FA Dist 68 cm/s             Popliteal Prox 36.1 cm/s       0.53          Popliteal Dist 45.8 cm/s       1.27             Lower Leg PSV (cm/s) Ratio   Post Tibial Dist 56.2 cm/s       1.23          Dorsalis Pedis Prox 49.2 cm/s                   ASSESSMENT AND PLAN         Problem List Items Addressed This Visit        Nervous    Claudication (CMS/HCC) - Primary    Overview     55-year-old former smoker had significant intermittent claudication, status post bilateral stenting, her right common iliac was completely occluded, status post procedure has had no residual symptoms, excellent clinical result.  Noninvasive vascular tests performed today for surveillance,         Current Assessment & Plan     Continue aspirin therapy.  Continue statin therapy.  We will make arrangements for a one-year follow-up with PVR.           Relevant Orders    Ultrasound JOURDAN extremity             Return in about 1 year (around 7/22/2021) for Recheck.     Sandip Ugalde MD, FACS      Vascular and Endovascular Specialist  [place] [address]  Phone [phone]  Fax  [phone]     Thank you very much for allowing us to participate in the care of your patient.  Please not hesitate to call or email if there are any questions.  I can be reached at [phone] (mobile) or [email or web address].  Sincerely.    This document was generated utilizing voice recognition technology. An attempt at proofreading has been made to minimize  errors but typographical errors may be present.

## 2020-07-22 NOTE — PROGRESS NOTES
[facility]  Follow-up Office Note  @ [facility]        DETAILS OF CONSULTATION   2020  Mirela Lopez               YOB: 1965  [id number]    Consulting Service:  [facility] IN [facility]    PCP:  Semaj Reina MD    Diagnosis: PAD     HISTORY OF PRESENT ILLNESS        Mirela Lopez  is a 55 y.o. female returning to the office for continued management of PAD, Mirela is following up after kissing iliac stent placement in .  She was previously a heavy smoker but has been a non-smoker for the last 3 years or more.  She continues to be claudication free, she is here for routine vascular follow-up.    She does continue to be compliant with her home medication including aspirin and Lipitor.    She did have a PVR study which we reviewed together today.      PAST MEDICAL AND SURGICAL HISTORY        Past Medical History:   Diagnosis Date   • Coronary artery disease    • GERD (gastroesophageal reflux disease)    • Hyperlipidemia    • Hypertension    • Impaired fasting glucose    • Myocardial infarction (CMS/[facility]) 2017    NSTEMI with stent left circumflex   • Obesity    • Peripheral vascular disease (CMS/[facility]) 2017    Right iliac claudication.  Aortogram demonstrated near occlusion of the right common iliac.  Successful right iliac stent 2017       Past Surgical History:   Procedure Laterality Date   • CARDIAC CATHETERIZATION  2017    LAD tortuous, no significant disease.  Dominant RCA-patent.  90% proximal cx, 95% mid cx and distal thrombus   • CARPAL TUNNEL RELEASE     •  SECTION     • CORONARY STENT PLACEMENT  2017    NERI ×2 left circumflex-performed in [place]   • ILIAC ARTERY STENT  10/2017    Dr. Ugalde       MEDICATIONS        Current Outpatient Medications on File Prior to Visit   Medication Sig Dispense Refill   • aspirin 81 mg enteric coated tablet Take 81 mg by mouth daily.     • atorvastatin (LIPITOR) 80 mg tablet Take 1  tablet (80 mg total) by mouth daily. 30 tablet 5   • ferrous sulfate 134 mg (27 mg iron) tablet Take 1 tablet by mouth daily with breakfast.    6   • lisinopril (PRINIVIL) 10 mg tablet Take 1 tablet (10 mg total) by mouth daily. 30 tablet 5   • metFORMIN XR (GLUCOPHAGE-XR) 750 mg 24 hr tablet Take 2 tablets (1,500 mg total) by mouth daily. With evening meal.     • metoprolol succinate XL (TOPROL-XL) 100 mg 24 hr tablet Take 1 tablet (100 mg total) by mouth daily. 30 tablet 5   • nitroglycerin (NITROSTAT) 0.4 mg SL tablet Place 1 tablet (0.4 mg total) under the tongue every 5 (five) minutes as needed for chest pain. 25 tablet 3     No current facility-administered medications on file prior to visit.        ALLERGIES        Adhesive and Adhesive tape-silicones     PHYSICAL EXAMINATION      There were no vitals taken for this visit.  There is no height or weight on file to calculate BMI.      Physical Exam   Constitutional: She is oriented to person, place, and time. She appears well-developed and well-nourished.   HENT:   Head: Normocephalic and atraumatic.   Eyes: Pupils are equal, round, and reactive to light. EOM are normal.   Neck: Normal range of motion. Neck supple. No tracheal deviation present.   Cardiovascular: Normal rate.   Pulses:       Dorsalis pedis pulses are 2+ on the right side, and 2+ on the left side.   Pulmonary/Chest: Effort normal and breath sounds normal.   Abdominal: Soft. She exhibits no mass.   Musculoskeletal: Normal range of motion.   Feet:   Right Foot:   Skin Integrity: Negative for ulcer.   Left Foot:   Skin Integrity: Negative for ulcer.   Neurological: She is alert and oriented to person, place, and time.   Skin: Skin is warm and dry.   Psychiatric: She has a normal mood and affect.   Vitals reviewed.      LABS / IMAGING / EKG        Imaging  I have independently reviewed the pertinent imaging from the last 24 hrs. and Significant findings include:     Lower extremity arterial  performed today July 22 thousand 20    Study Findings and Details     Study Details Arterial duplex examination of the bilateral lower extremities using B-mode, color Doppler and spectral Doppler.   Vascular Findings     Right Lower Arterial The right distal common femoral artery has triphasic flow.   The right proximal femoral artery has triphasic flow.   The right mid femoral artery has triphasic flow.   The right distal femoral artery has triphasic flow.   The right proximal popliteal artery has triphasic flow.   The right mid popliteal artery has triphasic flow.   The right distal popliteal artery has triphasic flow.   The right distal posterior tibial artery has triphasic flow.   The right proximal dorsalis pedis artery has triphasic flow.   Left Lower Arterial The left distal common femoral artery has biphasic flow.   The left proximal profunda artery has monophasic flow.   The left proximal femoral artery has biphasic flow.   The left mid femoral artery has biphasic flow.   The left distal femoral artery has biphasic flow.   The left proximal popliteal artery has biphasic flow.   The left distal popliteal artery has biphasic flow.   The left distal posterior tibial artery has biphasic flow.   The left proximal dorsalis pedis artery has biphasic flow.   Right Lower Arterial Measurements     Upper Leg PSV (cm/s) Ratio   CFA Dist 99.6 cm/s             Profunda Prox 150 cm/s       1.51          FA Prox 106 cm/s       1.06          FA Mid 131 cm/s             FA Dist 105 cm/s             Popliteal Prox 57.1 cm/s       0.54          Popliteal Dist 76.3 cm/s       1.34             Lower Leg PSV (cm/s) Ratio   Post Tibial Dist 106 cm/s       1.39                      Left Lower Arterial Measurements     Upper Leg PSV (cm/s) Ratio   CFA Dist 135 cm/s             Profunda Prox 31.5 cm/s       0.23          FA Prox 82.9 cm/s       0.61          FA Mid 88.9 cm/s       1.07          FA Dist 68 cm/s             Popliteal  Prox 36.1 cm/s       0.53          Popliteal Dist 45.8 cm/s       1.27             Lower Leg PSV (cm/s) Ratio   Post Tibial Dist 56.2 cm/s       1.23          Dorsalis Pedis Prox 49.2 cm/s                   ASSESSMENT AND PLAN         Problem List Items Addressed This Visit        Nervous    Claudication (CMS/HCC) - Primary    Overview     55-year-old former smoker had significant intermittent claudication, status post bilateral stenting, her right common iliac was completely occluded, status post procedure has had no residual symptoms, excellent clinical result.  Noninvasive vascular tests performed today for surveillance,         Current Assessment & Plan     Continue aspirin therapy.  Continue statin therapy.  We will make arrangements for a one-year follow-up with PVR.           Relevant Orders    Ultrasound JOURDAN extremity             Return in about 1 year (around 7/22/2021) for Recheck.     Sandip Ugalde MD, FACS      Vascular and Endovascular Specialist  [place] [address]  Phone [phone]  Fax  [phone]     Thank you very much for allowing us to participate in the care of your patient.  Please not hesitate to call or email if there are any questions.  I can be reached at [phone] (mobile) or [email or web address].  Sincerely.    This document was generated utilizing voice recognition technology. An attempt at proofreading has been made to minimize errors but typographical errors may be present.

## 2020-07-22 NOTE — PATIENT INSTRUCTIONS
Mirela Lopez    Your doctor is requesting that you follow up in the office  in 1 year.  You will be notified by the office to remind you to make an appointment.  At that time we will include scheduling instructions on the tests the doctor has requested you to have prior to that appointment.

## 2020-07-22 NOTE — Clinical Note
July 22, 2020     Semaj Reina MD  [address] [address]    Patient: Mirela Lopez  YOB: 1965  Date of Visit: 7/22/2020      Dear Dr. Reina:    Thank you for referring Mirela Lopez to me for evaluation. Below are my notes for this consultation.    If you have questions, please do not hesitate to call me. I look forward to following your patient along with you.         Sincerely,        Sandip Ugalde MD        CC: No Recipients  Sandip Ugalde MD  7/22/2020  3:16 PM  Sign at close encounter       [facility]  Follow-up Office Note  @ [facility]        DETAILS OF CONSULTATION   7/22/2020  Mirela Lopez               YOB: 1965  [id number]    Consulting Service:  [facility] IN [facility]    PCP:  Semaj Reina MD    Diagnosis: PAD     HISTORY OF PRESENT ILLNESS        Mirela Lopez  is a 55 y.o. female returning to the office for continued management of PAD, Mirela is following up after kissing iliac stent placement in 2017.  She was previously a heavy smoker but has been a non-smoker for the last 3 years or more.  She continues to be claudication free, she is here for routine vascular follow-up.    She does continue to be compliant with her home medication including aspirin and Lipitor.    She did have a PVR study which we reviewed together today.      PAST MEDICAL AND SURGICAL HISTORY        Past Medical History:   Diagnosis Date   • Coronary artery disease    • GERD (gastroesophageal reflux disease)    • Hyperlipidemia    • Hypertension    • Impaired fasting glucose    • Myocardial infarction (CMS/[facility]) 2017    NSTEMI with stent left circumflex   • Obesity    • Peripheral vascular disease (CMS/[facility]) 2017    Right iliac claudication.  Aortogram demonstrated near occlusion of the right common iliac.  Successful right iliac stent October 2017       Past Surgical History:   Procedure Laterality Date   • CARDIAC  CATHETERIZATION      LAD tortuous, no significant disease.  Dominant RCA-patent.  90% proximal cx, 95% mid cx and distal thrombus   • CARPAL TUNNEL RELEASE     •  SECTION     • CORONARY STENT PLACEMENT  2017    NERI ×2 left circumflex-performed in [place]   • ILIAC ARTERY STENT  10/2017    Dr. Ugalde       MEDICATIONS        Current Outpatient Medications on File Prior to Visit   Medication Sig Dispense Refill   • aspirin 81 mg enteric coated tablet Take 81 mg by mouth daily.     • atorvastatin (LIPITOR) 80 mg tablet Take 1 tablet (80 mg total) by mouth daily. 30 tablet 5   • ferrous sulfate 134 mg (27 mg iron) tablet Take 1 tablet by mouth daily with breakfast.    6   • lisinopril (PRINIVIL) 10 mg tablet Take 1 tablet (10 mg total) by mouth daily. 30 tablet 5   • metFORMIN XR (GLUCOPHAGE-XR) 750 mg 24 hr tablet Take 2 tablets (1,500 mg total) by mouth daily. With evening meal.     • metoprolol succinate XL (TOPROL-XL) 100 mg 24 hr tablet Take 1 tablet (100 mg total) by mouth daily. 30 tablet 5   • nitroglycerin (NITROSTAT) 0.4 mg SL tablet Place 1 tablet (0.4 mg total) under the tongue every 5 (five) minutes as needed for chest pain. 25 tablet 3     No current facility-administered medications on file prior to visit.        ALLERGIES        Adhesive and Adhesive tape-silicones     PHYSICAL EXAMINATION      There were no vitals taken for this visit.  There is no height or weight on file to calculate BMI.      Physical Exam   Constitutional: She is oriented to person, place, and time. She appears well-developed and well-nourished.   HENT:   Head: Normocephalic and atraumatic.   Eyes: Pupils are equal, round, and reactive to light. EOM are normal.   Neck: Normal range of motion. Neck supple. No tracheal deviation present.   Cardiovascular: Normal rate.   Pulses:       Dorsalis pedis pulses are 2+ on the right side, and 2+ on the left side.   Pulmonary/Chest: Effort normal and breath sounds normal.    Abdominal: Soft. She exhibits no mass.   Musculoskeletal: Normal range of motion.   Feet:   Right Foot:   Skin Integrity: Negative for ulcer.   Left Foot:   Skin Integrity: Negative for ulcer.   Neurological: She is alert and oriented to person, place, and time.   Skin: Skin is warm and dry.   Psychiatric: She has a normal mood and affect.   Vitals reviewed.      LABS / IMAGING / EKG        Imaging  I have independently reviewed the pertinent imaging from the last 24 hrs. and Significant findings include:     Lower extremity arterial performed today July 22 thousand 20    Study Findings and Details     Study Details Arterial duplex examination of the bilateral lower extremities using B-mode, color Doppler and spectral Doppler.   Vascular Findings     Right Lower Arterial The right distal common femoral artery has triphasic flow.   The right proximal femoral artery has triphasic flow.   The right mid femoral artery has triphasic flow.   The right distal femoral artery has triphasic flow.   The right proximal popliteal artery has triphasic flow.   The right mid popliteal artery has triphasic flow.   The right distal popliteal artery has triphasic flow.   The right distal posterior tibial artery has triphasic flow.   The right proximal dorsalis pedis artery has triphasic flow.   Left Lower Arterial The left distal common femoral artery has biphasic flow.   The left proximal profunda artery has monophasic flow.   The left proximal femoral artery has biphasic flow.   The left mid femoral artery has biphasic flow.   The left distal femoral artery has biphasic flow.   The left proximal popliteal artery has biphasic flow.   The left distal popliteal artery has biphasic flow.   The left distal posterior tibial artery has biphasic flow.   The left proximal dorsalis pedis artery has biphasic flow.   Right Lower Arterial Measurements     Upper Leg PSV (cm/s) Ratio   CFA Dist 99.6 cm/s             Profunda Prox 150 cm/s        1.51          FA Prox 106 cm/s       1.06          FA Mid 131 cm/s             FA Dist 105 cm/s             Popliteal Prox 57.1 cm/s       0.54          Popliteal Dist 76.3 cm/s       1.34             Lower Leg PSV (cm/s) Ratio   Post Tibial Dist 106 cm/s       1.39                      Left Lower Arterial Measurements     Upper Leg PSV (cm/s) Ratio   CFA Dist 135 cm/s             Profunda Prox 31.5 cm/s       0.23          FA Prox 82.9 cm/s       0.61          FA Mid 88.9 cm/s       1.07          FA Dist 68 cm/s             Popliteal Prox 36.1 cm/s       0.53          Popliteal Dist 45.8 cm/s       1.27             Lower Leg PSV (cm/s) Ratio   Post Tibial Dist 56.2 cm/s       1.23          Dorsalis Pedis Prox 49.2 cm/s                   ASSESSMENT AND PLAN         Problem List Items Addressed This Visit        Nervous    Claudication (CMS/HCC) - Primary    Overview     55-year-old former smoker had significant intermittent claudication, status post bilateral stenting, her right common iliac was completely occluded, status post procedure has had no residual symptoms, excellent clinical result.  Noninvasive vascular tests performed today for surveillance,         Current Assessment & Plan     Continue aspirin therapy.  Continue statin therapy.  We will make arrangements for a one-year follow-up with PVR.           Relevant Orders    Ultrasound JOURDAN extremity             Return in about 1 year (around 7/22/2021) for Recheck.     Sandip Ugalde MD, FACS      Vascular and Endovascular Specialist  [place] [address]  Phone [phone]  Fax  [phone]     Thank you very much for allowing us to participate in the care of your patient.  Please not hesitate to call or email if there are any questions.  I can be reached at [phone] (mobile) or [email or web address].  Sincerely.    This document was generated utilizing voice recognition technology. An attempt at proofreading has  been made to minimize errors but typographical errors may be present.

## 2020-08-17 ENCOUNTER — OFFICE VISIT (OUTPATIENT)
Dept: CARDIOLOGY | Facility: CLINIC | Age: 55
End: 2020-08-17
Payer: COMMERCIAL

## 2020-08-17 VITALS
HEIGHT: 64 IN | SYSTOLIC BLOOD PRESSURE: 120 MMHG | DIASTOLIC BLOOD PRESSURE: 80 MMHG | BODY MASS INDEX: 40.19 KG/M2 | WEIGHT: 235.4 LBS | RESPIRATION RATE: 16 BRPM | HEART RATE: 75 BPM

## 2020-08-17 DIAGNOSIS — I25.10 CAD IN NATIVE ARTERY: ICD-10-CM

## 2020-08-17 DIAGNOSIS — R94.31 ABNORMAL EKG: ICD-10-CM

## 2020-08-17 DIAGNOSIS — I10 ESSENTIAL HYPERTENSION: Primary | ICD-10-CM

## 2020-08-17 DIAGNOSIS — I73.9 PERIPHERAL VASCULAR DISEASE (CMS/HCC): ICD-10-CM

## 2020-08-17 PROCEDURE — 93000 ELECTROCARDIOGRAM COMPLETE: CPT | Performed by: INTERNAL MEDICINE

## 2020-08-17 PROCEDURE — 99214 OFFICE O/P EST MOD 30 MIN: CPT | Performed by: INTERNAL MEDICINE

## 2020-08-17 RX ORDER — ATORVASTATIN CALCIUM 80 MG/1
80 TABLET, FILM COATED ORAL DAILY
Qty: 30 TABLET | Refills: 5 | Status: SHIPPED | OUTPATIENT
Start: 2020-08-17 | End: 2021-04-19

## 2020-08-17 RX ORDER — METOPROLOL SUCCINATE 100 MG/1
100 TABLET, EXTENDED RELEASE ORAL DAILY
Qty: 30 TABLET | Refills: 5 | Status: SHIPPED | OUTPATIENT
Start: 2020-08-17 | End: 2021-04-19

## 2020-08-17 RX ORDER — LISINOPRIL 10 MG/1
10 TABLET ORAL DAILY
Qty: 30 TABLET | Refills: 5 | Status: SHIPPED | OUTPATIENT
Start: 2020-08-17 | End: 2021-04-19

## 2020-08-17 NOTE — LETTER
August 24, 2020     Semaj Reina MD  [address] [place] [id number]    Patient: Mirela Lopez  YOB: 1965  Date of Visit: 8/17/2020      Dear Dr. Reina:    Thank you for referring Mirela Lopez to me for evaluation. Below are my notes for this consultation.    If you have questions, please do not hesitate to call me. I look forward to following your patient along with you.         Sincerely,        Zara Mccloud MD        CC: MD Oneyda Naik Erin, MD  8/24/2020  6:10 PM  Signed       Cardiology Office Visit     Patient ID: Mirela Lopez 55 y.o. female 1965  PCP: Semaj Reina MD   History Present Illness     Mirela Lopez returns to the office today for follow-up of her coronary artery disease status post non-ST segment elevation MI modification in [place] in August 2017.  At that time she was found to be 95% mid and distal circumflex stenosis with thrombus as well as a 90% proximal circumflex lesion.  Both of these lesions underwent successful NERI and she had no other obstructive disease in other vessels.  Aortography performed at the time of her catheterization showed an occluded right iliac but subsequent evaluation after returning home showed some patency.  Mirela was referred to vascular surgery and underwent successful right common iliac stenting in October 2017.  Mirela has stopped smoking and continues to abstain from tobacco.    Today in the office she says she feels well.  She denies any claudication.  She has not had any nonhealing wounds.  She anticipates having blood work done later today.  I reviewed the records from her last primary care visit and her blood pressure was 120/68 mmHg.  Mirela denies any recurrent symptoms of angina and continues to work full-time.  She does not engage in any regular exercise.    Past History   History review: Pertinent allergies, medications, medical history, surgical history, social history and family history  "reviewed and updated appropriately.    Allergies/Medications   Allergies:Adhesive and Adhesive tape-silicones    Medications:  Outpatient Encounter Medications as of 8/17/2020:   •  aspirin 81 mg enteric coated tablet, Take 81 mg by mouth daily.  •  atorvastatin (LIPITOR) 80 mg tablet, Take 1 tablet (80 mg total) by mouth daily.  •  ferrous sulfate 134 mg (27 mg iron) tablet, Take 1 tablet by mouth daily with breakfast.    •  lisinopriL (PRINIVIL) 10 mg tablet, Take 1 tablet (10 mg total) by mouth daily.  •  metFORMIN XR (GLUCOPHAGE-XR) 750 mg 24 hr tablet, Take 2 tablets (1,500 mg total) by mouth daily. With evening meal.  •  metoprolol succinate XL (TOPROL-XL) 100 mg 24 hr tablet, Take 1 tablet (100 mg total) by mouth daily.  •  nitroglycerin (NITROSTAT) 0.4 mg SL tablet, Place 1 tablet (0.4 mg total) under the tongue every 5 (five) minutes as needed for chest pain.    ROS/Physical Exam   ROS  Pertinent items are noted in HPI.  Weight is down approximately 6 pounds.  Comprehensive (12+ point) ROS otherwise negative.  Vitals:    08/17/20 1024   BP: 120/80   BP Location: Left upper arm   Patient Position: Sitting   Pulse: 75   Resp: 16   Weight: 107 kg (235 lb 6.4 oz)   Height: 1.626 m (5' 4\")     BP Readings from Last 3 Encounters:   08/17/20 120/80   11/18/19 140/80   07/17/19 109/70     Wt Readings from Last 3 Encounters:   08/17/20 107 kg (235 lb 6.4 oz)   07/22/20 109 kg (241 lb)   11/18/19 109 kg (241 lb 6.4 oz)     Physical Exam   Constitutional: She is oriented to person, place, and time. She appears well-developed. She is cooperative.   Obese white female in no apparent distress   HENT:   Head: Normocephalic and atraumatic.   Mouth/Throat: Oropharynx is clear and moist and mucous membranes are normal.   Eyes: Conjunctivae and EOM are normal. No scleral icterus.   Neck: Trachea normal and normal range of motion. Neck supple. No hepatojugular reflux and no JVD present. Carotid bruit is not present. No " thyromegaly present.   Cardiovascular: Normal rate, regular rhythm, S1 normal, S2 normal and normal heart sounds. Exam reveals decreased pulses. Exam reveals no gallop, no S3, no S4 and no friction rub.   Pulses:       Radial pulses are 2+ on the right side, and 2+ on the left side.        Posterior tibial pulses are 1+ on the right side, and 1+ on the left side.   There is a 1/6 systolic murmur at the left sternal border.   Pulmonary/Chest: Effort normal and breath sounds normal. She has no wheezes. She has no rhonchi. She has no rales.   Abdominal: Soft. Bowel sounds are normal. She exhibits no abdominal bruit. There is no tenderness.   Obese abdomen   Musculoskeletal: Normal range of motion. She exhibits no tenderness or deformity.   Neurological: She is alert and oriented to person, place, and time. No cranial nerve deficit.   Skin: Skin is warm, dry and intact. No rash noted. No cyanosis. Nails show no clubbing.   Psychiatric: She has a normal mood and affect. Her speech is normal and behavior is normal.     Labs/Imaging/Procedures   Labs  8/17/2020: WBC 6.4, hemoglobin 11.8, platelets 164,000, glucose 292, BUN 9, creatinine 0.68, sodium 137, potassium 4.4, chloride 100, CO2 21, alkaline phosphatase 119, otherwise LFTs normal.  Hemoglobin A1c 9.  Total cholesterol 103, triglycerides 172, HDL 31, LDL 38 mg/dL.      April 2019:Glucose 194, BUN 12, potassium 3.7, creatinine 0.6, hemoglobin 11.8, platelets 184,000, hemoglobin A1c 8.3     Imaging  LE arterial duplex bilateral 7/17/2019: Triphasic and biphasic waveforms throughout the right and left lower extremities.  No evidence of discrete stenosis or obstruction.       Exercise stress echocardiogram 4/19/2019: Very technically difficult study.  Exercise EKG negative for ischemia or arrhythmia.  Poor exercise capacity completing only stage I Abner protocol.  Exercise EKG negative for ischemia or arrhythmia at that workload.  At rest, normal LV size and function  with LVEF 60-65%.  Basal mid inferior hypokinesis.  Mildly dilated RV with preserved function.  Trace TR.  Trace MR.  Trileaflet aortic valve.  No significant stenosis or insufficiency.  Prominent pericardial fat pad.  Immediately following exercise overall LV function improves.  Persistent basal inferior hypokinesis consistent with scar.  No ischemia.       PVR 1/9/2019: Right JOURDAN 1.12 at rest and 1.1 following exercise.  Left JUORDAN 0.95 at rest and 0.96 following exercise.  Normal segmental pressures and PVRs.       Periph Intervention 10/2017: Successful right iliac stent.     Abd Ao Duplex 9/2017: Elevated velocities right MICHAEL with monophasic flow.     Echo 9/2017: LVEF 65%, Top normal LV size with overall preserved function. No significant pulmonary hypertension by Doppler. Technically difficult study.     Cath 8/13/17: LAD tortuous, no significant disease. RCA dominant and patent. Left circumflex with 90% proximal lesion, 95% mid lesion and distal thrombus. Successful NERI ×2 left circumflex. Occluded right MICHAEL at origin with collaterals. Patent left MICHAEL and CFA    EKG  Assessment/Plan     1. Essential hypertension  Mirela has a well-controlled blood pressure and should remain on lisinopril 10 mg daily with Toprol- mg daily.    2. Peripheral vascular disease (CMS/HCC)  Mirela denies claudication and her examination is stable.  Continue aspirin and statin therapy.  She has achieved an LDL goal less than 70 mg/dL.    3. CAD in native artery  No new anginal symptoms and EKG is unchanged.  Continue aspirin, atorvastatin, lisinopril and beta-blocker.    4. Abnormal EKG  EKG is abnormal but unchanged.  No additional recommendations at this time.    5.  Diabetes mellitus  Patient's lab work was reviewed after the visit and her hemoglobin A1c is up to 9.  She is on metformin 1500 mg daily. I recommended the following to the patient: lose weight, increase physical activity with a goal of 150 minutes of moderate  intensity or 75 minutes vigorous intensity aerobic exercise per week, Mediterranean diet incorporating vegetables, fruits, whole grains, plant-based protein, lean animal protein and fish, decrease sugar intake including sweetened drinks and refined carbohydrates and consider addition of SGLT2 inhibitor given her cardiovascular history.    Return in about 6 months (around 2/17/2021) for follow-up, earlier if any change in symptoms.  I have reviewed the patient's medical history in detail and updated the computerized patient record.  Thank you for allowing me to participate in the care of this patient.  I hope this information is helpful.  Social distancing and careful monitoring for any COVID symptoms discussed with patient.      Zara Mccloud MD [facility], [facility]  8/24/2020  6:01 PM  This document was generated utilizing voice recognition technology. Please excuse any typographical errors which may be present.

## 2020-08-21 ENCOUNTER — APPOINTMENT (RX ONLY)
Dept: URBAN - METROPOLITAN AREA CLINIC 23 | Facility: CLINIC | Age: 55
Setting detail: DERMATOLOGY
End: 2020-08-21

## 2020-08-21 DIAGNOSIS — L72.8 OTHER FOLLICULAR CYSTS OF THE SKIN AND SUBCUTANEOUS TISSUE: ICD-10-CM

## 2020-08-21 PROBLEM — D48.5 NEOPLASM OF UNCERTAIN BEHAVIOR OF SKIN: Status: ACTIVE | Noted: 2020-08-21

## 2020-08-21 PROCEDURE — 99212 OFFICE O/P EST SF 10 MIN: CPT

## 2020-08-21 PROCEDURE — ? COUNSELING

## 2020-08-21 PROCEDURE — ? DEFER

## 2020-08-21 ASSESSMENT — LOCATION ZONE DERM: LOCATION ZONE: ARM

## 2020-08-21 ASSESSMENT — LOCATION DETAILED DESCRIPTION DERM: LOCATION DETAILED: RIGHT POSTERIOR SHOULDER

## 2020-08-21 ASSESSMENT — LOCATION SIMPLE DESCRIPTION DERM: LOCATION SIMPLE: RIGHT SHOULDER

## 2020-08-21 NOTE — PROCEDURE: DEFER
Introduction Text (Please End With A Colon): The following procedure was deferred:
Detail Level: Detailed
Procedure To Be Performed At Next Visit: Excision
Scheduling Instructions (Optional): schedule with Dr. Bellamy

## 2020-08-24 PROBLEM — I25.10 CORONARY ARTERY DISEASE: Status: ACTIVE | Noted: 2020-08-11

## 2020-08-24 PROBLEM — R22.1 SUBCUTANEOUS MASS OF NECK: Status: ACTIVE | Noted: 2020-08-11

## 2020-08-24 NOTE — PROGRESS NOTES
Cardiology Office Visit     Patient ID: Mirela Lopez 55 y.o. female 1965  PCP: Semaj Reina MD   History Present Illness     Mirela Lopez returns to the office today for follow-up of her coronary artery disease status post non-ST segment elevation MI modification in [place] in August 2017.  At that time she was found to be 95% mid and distal circumflex stenosis with thrombus as well as a 90% proximal circumflex lesion.  Both of these lesions underwent successful NERI and she had no other obstructive disease in other vessels.  Aortography performed at the time of her catheterization showed an occluded right iliac but subsequent evaluation after returning home showed some patency.  Mirela was referred to vascular surgery and underwent successful right common iliac stenting in October 2017.  Mirela has stopped smoking and continues to abstain from tobacco.    Today in the office she says she feels well.  She denies any claudication.  She has not had any nonhealing wounds.  She anticipates having blood work done later today.  I reviewed the records from her last primary care visit and her blood pressure was 120/68 mmHg.  Mirela denies any recurrent symptoms of angina and continues to work full-time.  She does not engage in any regular exercise.    Past History   History review: Pertinent allergies, medications, medical history, surgical history, social history and family history reviewed and updated appropriately.    Allergies/Medications   Allergies:Adhesive and Adhesive tape-silicones    Medications:  Outpatient Encounter Medications as of 8/17/2020:   •  aspirin 81 mg enteric coated tablet, Take 81 mg by mouth daily.  •  atorvastatin (LIPITOR) 80 mg tablet, Take 1 tablet (80 mg total) by mouth daily.  •  ferrous sulfate 134 mg (27 mg iron) tablet, Take 1 tablet by mouth daily with breakfast.    •  lisinopriL (PRINIVIL) 10 mg tablet, Take 1 tablet (10 mg total) by mouth daily.  •  metFORMIN XR  "(GLUCOPHAGE-XR) 750 mg 24 hr tablet, Take 2 tablets (1,500 mg total) by mouth daily. With evening meal.  •  metoprolol succinate XL (TOPROL-XL) 100 mg 24 hr tablet, Take 1 tablet (100 mg total) by mouth daily.  •  nitroglycerin (NITROSTAT) 0.4 mg SL tablet, Place 1 tablet (0.4 mg total) under the tongue every 5 (five) minutes as needed for chest pain.    ROS/Physical Exam   ROS  Pertinent items are noted in HPI.  Weight is down approximately 6 pounds.  Comprehensive (12+ point) ROS otherwise negative.  Vitals:    08/17/20 1024   BP: 120/80   BP Location: Left upper arm   Patient Position: Sitting   Pulse: 75   Resp: 16   Weight: 107 kg (235 lb 6.4 oz)   Height: 1.626 m (5' 4\")     BP Readings from Last 3 Encounters:   08/17/20 120/80   11/18/19 140/80   07/17/19 109/70     Wt Readings from Last 3 Encounters:   08/17/20 107 kg (235 lb 6.4 oz)   07/22/20 109 kg (241 lb)   11/18/19 109 kg (241 lb 6.4 oz)     Physical Exam   Constitutional: She is oriented to person, place, and time. She appears well-developed. She is cooperative.   Obese white female in no apparent distress   HENT:   Head: Normocephalic and atraumatic.   Mouth/Throat: Oropharynx is clear and moist and mucous membranes are normal.   Eyes: Conjunctivae and EOM are normal. No scleral icterus.   Neck: Trachea normal and normal range of motion. Neck supple. No hepatojugular reflux and no JVD present. Carotid bruit is not present. No thyromegaly present.   Cardiovascular: Normal rate, regular rhythm, S1 normal, S2 normal and normal heart sounds. Exam reveals decreased pulses. Exam reveals no gallop, no S3, no S4 and no friction rub.   Pulses:       Radial pulses are 2+ on the right side, and 2+ on the left side.        Posterior tibial pulses are 1+ on the right side, and 1+ on the left side.   There is a 1/6 systolic murmur at the left sternal border.   Pulmonary/Chest: Effort normal and breath sounds normal. She has no wheezes. She has no rhonchi. She has " no rales.   Abdominal: Soft. Bowel sounds are normal. She exhibits no abdominal bruit. There is no tenderness.   Obese abdomen   Musculoskeletal: Normal range of motion. She exhibits no tenderness or deformity.   Neurological: She is alert and oriented to person, place, and time. No cranial nerve deficit.   Skin: Skin is warm, dry and intact. No rash noted. No cyanosis. Nails show no clubbing.   Psychiatric: She has a normal mood and affect. Her speech is normal and behavior is normal.     Labs/Imaging/Procedures   Labs  8/17/2020: WBC 6.4, hemoglobin 11.8, platelets 164,000, glucose 292, BUN 9, creatinine 0.68, sodium 137, potassium 4.4, chloride 100, CO2 21, alkaline phosphatase 119, otherwise LFTs normal.  Hemoglobin A1c 9.  Total cholesterol 103, triglycerides 172, HDL 31, LDL 38 mg/dL.      April 2019:Glucose 194, BUN 12, potassium 3.7, creatinine 0.6, hemoglobin 11.8, platelets 184,000, hemoglobin A1c 8.3     Imaging  LE arterial duplex bilateral 7/17/2019: Triphasic and biphasic waveforms throughout the right and left lower extremities.  No evidence of discrete stenosis or obstruction.       Exercise stress echocardiogram 4/19/2019: Very technically difficult study.  Exercise EKG negative for ischemia or arrhythmia.  Poor exercise capacity completing only stage I Abner protocol.  Exercise EKG negative for ischemia or arrhythmia at that workload.  At rest, normal LV size and function with LVEF 60-65%.  Basal mid inferior hypokinesis.  Mildly dilated RV with preserved function.  Trace TR.  Trace MR.  Trileaflet aortic valve.  No significant stenosis or insufficiency.  Prominent pericardial fat pad.  Immediately following exercise overall LV function improves.  Persistent basal inferior hypokinesis consistent with scar.  No ischemia.       PVR 1/9/2019: Right JOURDAN 1.12 at rest and 1.1 following exercise.  Left JOURDAN 0.95 at rest and 0.96 following exercise.  Normal segmental pressures and PVRs.       Periph  Intervention 10/2017: Successful right iliac stent.     Abd Ao Duplex 9/2017: Elevated velocities right MICHAEL with monophasic flow.     Echo 9/2017: LVEF 65%, Top normal LV size with overall preserved function. No significant pulmonary hypertension by Doppler. Technically difficult study.     Cath 8/13/17: LAD tortuous, no significant disease. RCA dominant and patent. Left circumflex with 90% proximal lesion, 95% mid lesion and distal thrombus. Successful NERI ×2 left circumflex. Occluded right MICHAEL at origin with collaterals. Patent left MICHAEL and CFA    EKG  Assessment/Plan     1. Essential hypertension  Mirela has a well-controlled blood pressure and should remain on lisinopril 10 mg daily with Toprol- mg daily.    2. Peripheral vascular disease (CMS/HCC)  Mirela denies claudication and her examination is stable.  Continue aspirin and statin therapy.  She has achieved an LDL goal less than 70 mg/dL.    3. CAD in native artery  No new anginal symptoms and EKG is unchanged.  Continue aspirin, atorvastatin, lisinopril and beta-blocker.    4. Abnormal EKG  EKG is abnormal but unchanged.  No additional recommendations at this time.    5.  Diabetes mellitus  Patient's lab work was reviewed after the visit and her hemoglobin A1c is up to 9.  She is on metformin 1500 mg daily. I recommended the following to the patient: lose weight, increase physical activity with a goal of 150 minutes of moderate intensity or 75 minutes vigorous intensity aerobic exercise per week, Mediterranean diet incorporating vegetables, fruits, whole grains, plant-based protein, lean animal protein and fish, decrease sugar intake including sweetened drinks and refined carbohydrates and consider addition of SGLT2 inhibitor given her cardiovascular history.    Return in about 6 months (around 2/17/2021) for follow-up, earlier if any change in symptoms.  I have reviewed the patient's medical history in detail and updated the computerized patient  record.  Thank you for allowing me to participate in the care of this patient.  I hope this information is helpful.  Social distancing and careful monitoring for any COVID symptoms discussed with patient.      Zara Mccloud MD [facility]  8/24/2020  6:01 PM  This document was generated utilizing voice recognition technology. Please excuse any typographical errors which may be present.

## 2020-09-28 ENCOUNTER — APPOINTMENT (RX ONLY)
Dept: URBAN - METROPOLITAN AREA CLINIC 23 | Facility: CLINIC | Age: 55
Setting detail: DERMATOLOGY
End: 2020-09-28

## 2020-09-28 DIAGNOSIS — D17 BENIGN LIPOMATOUS NEOPLASM: ICD-10-CM

## 2020-09-28 PROBLEM — D48.5 NEOPLASM OF UNCERTAIN BEHAVIOR OF SKIN: Status: ACTIVE | Noted: 2020-09-28

## 2020-09-28 PROCEDURE — 12032 INTMD RPR S/A/T/EXT 2.6-7.5: CPT

## 2020-09-28 PROCEDURE — ? EXCISION

## 2020-09-28 PROCEDURE — 11404 EXC TR-EXT B9+MARG 3.1-4 CM: CPT

## 2020-09-28 ASSESSMENT — LOCATION SIMPLE DESCRIPTION DERM: LOCATION SIMPLE: RIGHT SHOULDER

## 2020-09-28 ASSESSMENT — LOCATION ZONE DERM: LOCATION ZONE: ARM

## 2020-09-28 ASSESSMENT — LOCATION DETAILED DESCRIPTION DERM: LOCATION DETAILED: RIGHT POSTERIOR SHOULDER

## 2020-09-28 NOTE — PROCEDURE: EXCISION
Medical Necessity Information: It is in your best interest to select a reason for this procedure from the list below. All of these items fulfill various CMS LCD requirements except lesion extends to a margin.
Include Z78.9 (Other Specified Conditions Influencing Health Status) As An Associated Diagnosis?: No
Medical Necessity Clause: This procedure was medically necessary because the lesion that was treated was:
Lab: -[id number]
Biopsy Photograph Reviewed: Yes
Size Of Lesion In Cm: 4
X Size Of Lesion In Cm (Optional): 0
Anesthesia Volume In Cc: 10
Excision Method: Elliptical
Repair Type: Intermediate
Suturegard Retention Suture: 2-0 Nylon
Retention Suture Bite Size: 3 mm
Number Of Hemigard Strips Per Side: 1
Intermediate / Complex Repair - Final Wound Length In Cm: 3.3
Undermining Type: Entire Wound
Debridement Text: The wound edges were debrided prior to proceeding with the closure to facilitate wound healing.
Helical Rim Text: The closure involved the helical rim.
Vermilion Border Text: The closure involved the vermilion border.
Nostril Rim Text: The closure involved the nostril rim.
Retention Suture Text: Retention sutures were placed to support the closure and prevent dehiscence.
Suture Removal: 11 days
Epidermal Closure Graft Donor Site (Optional): simple interrupted
Detail Level: Detailed
Excision Depth: adipose tissue
Scalpel Size: 15 blade
Anesthesia Type: 1% lidocaine without epinephrine
Additional Anesthesia Volume In Cc: 6
Hemostasis: Electrocautery
Estimated Blood Loss (Cc): minimal
Anesthesia Type: 1% lidocaine with epinephrine
Deep Sutures: 3-0 Monocryl
Epidermal Sutures: 4-0 Ethilon
Additional Epidermal Closure (Optional): vertical mattress
Wound Care: Petrolatum
Dressing: steri-strips and pressure dressing
Suturegard Intro: Intraoperative tissue expansion was performed, utilizing the SUTUREGARD device, in order to reduce wound tension.
Suturegard Body: The suture ends were repeatedly re-tightened and re-clamped to achieve the desired tissue expansion.
Hemigard Intro: Due to skin fragility and wound tension, it was decided to use HEMIGARD adhesive retention suture devices to permit a linear closure. The skin was cleaned and dried for a 6cm distance away from the wound. Excessive hair, if present, was removed to allow for adhesion.
Hemigard Postcare Instructions: The HEMIGARD strips are to remain completely dry for at least 5-7 days.
Complex Repair Preamble Text (Leave Blank If You Do Not Want): Extensive wide undermining was performed.
Intermediate Repair Preamble Text (Leave Blank If You Do Not Want): Undermining was performed with blunt dissection.
Fusiform Excision Additional Text (Leave Blank If You Do Not Want): The margin was drawn around the clinically apparent lesion.  A fusiform shape was then drawn on the skin incorporating the lesion and margins.  Incisions were then made along these lines to the appropriate tissue plane and the lesion was extirpated.
Eliptical Excision Additional Text (Leave Blank If You Do Not Want): The margin was drawn around the clinically apparent lesion.  An elliptical shape was then drawn on the skin incorporating the lesion and margins.  Incisions were then made along these lines to the appropriate tissue plane and the lesion was extirpated.
Saucerization Excision Additional Text (Leave Blank If You Do Not Want): The margin was drawn around the clinically apparent lesion.  Incisions were then made along these lines, in a tangential fashion, to the appropriate tissue plane and the lesion was extirpated.
Slit Excision Additional Text (Leave Blank If You Do Not Want): A linear line was drawn on the skin overlying the lesion. An incision was made slowly until the lesion was visualized.  Once visualized, the lesion was removed with blunt dissection.
Excisional Biopsy Additional Text (Leave Blank If You Do Not Want): The margin was drawn around the clinically apparent lesion. An elliptical shape was then drawn on the skin incorporating the lesion and margins.  Incisions were then made along these lines to the appropriate tissue plane and the lesion was extirpated.
Perilesional Excision Additional Text (Leave Blank If You Do Not Want): The margin was drawn around the clinically apparent lesion. Incisions were then made along these lines to the appropriate tissue plane and the lesion was extirpated.
Repair Performed By Another Provider Text (Leave Blank If You Do Not Want): After the tissue was excised the defect was repaired by another provider.
No Repair - Repaired With Adjacent Surgical Defect Text (Leave Blank If You Do Not Want): After the excision the defect was repaired concurrently with another surgical defect which was in close approximation.
Advancement Flap (Single) Text: The defect edges were debeveled with a #15 scalpel blade.  Given the location of the defect and the proximity to free margins a single advancement flap was deemed most appropriate.  Using a sterile surgical marker, an appropriate advancement flap was drawn incorporating the defect and placing the expected incisions within the relaxed skin tension lines where possible.    The area thus outlined was incised deep to adipose tissue with a #15 scalpel blade.  The skin margins were undermined to an appropriate distance in all directions utilizing iris scissors.
Advancement Flap (Double) Text: The defect edges were debeveled with a #15 scalpel blade.  Given the location of the defect and the proximity to free margins a double advancement flap was deemed most appropriate.  Using a sterile surgical marker, the appropriate advancement flaps were drawn incorporating the defect and placing the expected incisions within the relaxed skin tension lines where possible.    The area thus outlined was incised deep to adipose tissue with a #15 scalpel blade.  The skin margins were undermined to an appropriate distance in all directions utilizing iris scissors.
Burow's Advancement Flap Text: The defect edges were debeveled with a #15 scalpel blade.  Given the location of the defect and the proximity to free margins a Burow's advancement flap was deemed most appropriate.  Using a sterile surgical marker, the appropriate advancement flap was drawn incorporating the defect and placing the expected incisions within the relaxed skin tension lines where possible.    The area thus outlined was incised deep to adipose tissue with a #15 scalpel blade.  The skin margins were undermined to an appropriate distance in all directions utilizing iris scissors.
Chonodrocutaneous Helical Advancement Flap Text: The defect edges were debeveled with a #15 scalpel blade.  Given the location of the defect and the proximity to free margins a chondrocutaneous helical advancement flap was deemed most appropriate.  Using a sterile surgical marker, the appropriate advancement flap was drawn incorporating the defect and placing the expected incisions within the relaxed skin tension lines where possible.    The area thus outlined was incised deep to adipose tissue with a #15 scalpel blade.  The skin margins were undermined to an appropriate distance in all directions utilizing iris scissors.
Crescentic Advancement Flap Text: The defect edges were debeveled with a #15 scalpel blade.  Given the location of the defect and the proximity to free margins a crescentic advancement flap was deemed most appropriate.  Using a sterile surgical marker, the appropriate advancement flap was drawn incorporating the defect and placing the expected incisions within the relaxed skin tension lines where possible.    The area thus outlined was incised deep to adipose tissue with a #15 scalpel blade.  The skin margins were undermined to an appropriate distance in all directions utilizing iris scissors.
A-T Advancement Flap Text: The defect edges were debeveled with a #15 scalpel blade.  Given the location of the defect, shape of the defect and the proximity to free margins an A-T advancement flap was deemed most appropriate.  Using a sterile surgical marker, an appropriate advancement flap was drawn incorporating the defect and placing the expected incisions within the relaxed skin tension lines where possible.    The area thus outlined was incised deep to adipose tissue with a #15 scalpel blade.  The skin margins were undermined to an appropriate distance in all directions utilizing iris scissors.
O-T Advancement Flap Text: The defect edges were debeveled with a #15 scalpel blade.  Given the location of the defect, shape of the defect and the proximity to free margins an O-T advancement flap was deemed most appropriate.  Using a sterile surgical marker, an appropriate advancement flap was drawn incorporating the defect and placing the expected incisions within the relaxed skin tension lines where possible.    The area thus outlined was incised deep to adipose tissue with a #15 scalpel blade.  The skin margins were undermined to an appropriate distance in all directions utilizing iris scissors.
O-L Flap Text: The defect edges were debeveled with a #15 scalpel blade.  Given the location of the defect, shape of the defect and the proximity to free margins an O-L flap was deemed most appropriate.  Using a sterile surgical marker, an appropriate advancement flap was drawn incorporating the defect and placing the expected incisions within the relaxed skin tension lines where possible.    The area thus outlined was incised deep to adipose tissue with a #15 scalpel blade.  The skin margins were undermined to an appropriate distance in all directions utilizing iris scissors.
O-Z Flap Text: The defect edges were debeveled with a #15 scalpel blade.  Given the location of the defect, shape of the defect and the proximity to free margins an O-Z flap was deemed most appropriate.  Using a sterile surgical marker, an appropriate transposition flap was drawn incorporating the defect and placing the expected incisions within the relaxed skin tension lines where possible. The area thus outlined was incised deep to adipose tissue with a #15 scalpel blade.  The skin margins were undermined to an appropriate distance in all directions utilizing iris scissors.
Double O-Z Flap Text: The defect edges were debeveled with a #15 scalpel blade.  Given the location of the defect, shape of the defect and the proximity to free margins a Double O-Z flap was deemed most appropriate.  Using a sterile surgical marker, an appropriate transposition flap was drawn incorporating the defect and placing the expected incisions within the relaxed skin tension lines where possible. The area thus outlined was incised deep to adipose tissue with a #15 scalpel blade.  The skin margins were undermined to an appropriate distance in all directions utilizing iris scissors.
V-Y Flap Text: The defect edges were debeveled with a #15 scalpel blade.  Given the location of the defect, shape of the defect and the proximity to free margins a V-Y flap was deemed most appropriate.  Using a sterile surgical marker, an appropriate advancement flap was drawn incorporating the defect and placing the expected incisions within the relaxed skin tension lines where possible.    The area thus outlined was incised deep to adipose tissue with a #15 scalpel blade.  The skin margins were undermined to an appropriate distance in all directions utilizing iris scissors.
Mercedes Flap Text: The defect edges were debeveled with a #15 scalpel blade.  Given the location of the defect, shape of the defect and the proximity to free margins a Mercedes flap was deemed most appropriate.  Using a sterile surgical marker, an appropriate advancement flap was drawn incorporating the defect and placing the expected incisions within the relaxed skin tension lines where possible. The area thus outlined was incised deep to adipose tissue with a #15 scalpel blade.  The skin margins were undermined to an appropriate distance in all directions utilizing iris scissors.
Modified Advancement Flap Text: The defect edges were debeveled with a #15 scalpel blade.  Given the location of the defect, shape of the defect and the proximity to free margins a modified advancement flap was deemed most appropriate.  Using a sterile surgical marker, an appropriate advancement flap was drawn incorporating the defect and placing the expected incisions within the relaxed skin tension lines where possible.    The area thus outlined was incised deep to adipose tissue with a #15 scalpel blade.  The skin margins were undermined to an appropriate distance in all directions utilizing iris scissors.
Mucosal Advancement Flap Text: Given the location of the defect, shape of the defect and the proximity to free margins a mucosal advancement flap was deemed most appropriate. Incisions were made with a 15 blade scalpel in the appropriate fashion along the cutaneous vermillion border and the mucosal lip. The remaining actinically damaged mucosal tissue was excised.  The mucosal advancement flap was then elevated to the gingival sulcus with care taken to preserve the neurovascular structures and advanced into the primary defect. Care was taken to ensure that precise realignment of the vermilion border was achieved.
Hatchet Flap Text: The defect edges were debeveled with a #15 scalpel blade.  Given the location of the defect, shape of the defect and the proximity to free margins a hatchet flap was deemed most appropriate.  Using a sterile surgical marker, an appropriate hatchet flap was drawn incorporating the defect and placing the expected incisions within the relaxed skin tension lines where possible.    The area thus outlined was incised deep to adipose tissue with a #15 scalpel blade.  The skin margins were undermined to an appropriate distance in all directions utilizing iris scissors.
Rotation Flap Text: The defect edges were debeveled with a #15 scalpel blade.  Given the location of the defect, shape of the defect and the proximity to free margins a rotation flap was deemed most appropriate.  Using a sterile surgical marker, an appropriate rotation flap was drawn incorporating the defect and placing the expected incisions within the relaxed skin tension lines where possible.    The area thus outlined was incised deep to adipose tissue with a #15 scalpel blade.  The skin margins were undermined to an appropriate distance in all directions utilizing iris scissors.
Spiral Flap Text: The defect edges were debeveled with a #15 scalpel blade.  Given the location of the defect, shape of the defect and the proximity to free margins a spiral flap was deemed most appropriate.  Using a sterile surgical marker, an appropriate rotation flap was drawn incorporating the defect and placing the expected incisions within the relaxed skin tension lines where possible. The area thus outlined was incised deep to adipose tissue with a #15 scalpel blade.  The skin margins were undermined to an appropriate distance in all directions utilizing iris scissors.
Star Wedge Flap Text: The defect edges were debeveled with a #15 scalpel blade.  Given the location of the defect, shape of the defect and the proximity to free margins a star wedge flap was deemed most appropriate.  Using a sterile surgical marker, an appropriate rotation flap was drawn incorporating the defect and placing the expected incisions within the relaxed skin tension lines where possible. The area thus outlined was incised deep to adipose tissue with a #15 scalpel blade.  The skin margins were undermined to an appropriate distance in all directions utilizing iris scissors.
Transposition Flap Text: The defect edges were debeveled with a #15 scalpel blade.  Given the location of the defect and the proximity to free margins a transposition flap was deemed most appropriate.  Using a sterile surgical marker, an appropriate transposition flap was drawn incorporating the defect.    The area thus outlined was incised deep to adipose tissue with a #15 scalpel blade.  The skin margins were undermined to an appropriate distance in all directions utilizing iris scissors.
Muscle Hinge Flap Text: The defect edges were debeveled with a #15 scalpel blade.  Given the size, depth and location of the defect and the proximity to free margins a muscle hinge flap was deemed most appropriate.  Using a sterile surgical marker, an appropriate hinge flap was drawn incorporating the defect. The area thus outlined was incised with a #15 scalpel blade.  The skin margins were undermined to an appropriate distance in all directions utilizing iris scissors.
Melolabial Transposition Flap Text: The defect edges were debeveled with a #15 scalpel blade.  Given the location of the defect and the proximity to free margins a melolabial flap was deemed most appropriate.  Using a sterile surgical marker, an appropriate melolabial transposition flap was drawn incorporating the defect.    The area thus outlined was incised deep to adipose tissue with a #15 scalpel blade.  The skin margins were undermined to an appropriate distance in all directions utilizing iris scissors.
Rhombic Flap Text: The defect edges were debeveled with a #15 scalpel blade.  Given the location of the defect and the proximity to free margins a rhombic flap was deemed most appropriate.  Using a sterile surgical marker, an appropriate rhombic flap was drawn incorporating the defect.    The area thus outlined was incised deep to adipose tissue with a #15 scalpel blade.  The skin margins were undermined to an appropriate distance in all directions utilizing iris scissors.
Rhomboid Transposition Flap Text: The defect edges were debeveled with a #15 scalpel blade.  Given the location of the defect and the proximity to free margins a rhomboid transposition flap was deemed most appropriate.  Using a sterile surgical marker, an appropriate rhomboid flap was drawn incorporating the defect.    The area thus outlined was incised deep to adipose tissue with a #15 scalpel blade.  The skin margins were undermined to an appropriate distance in all directions utilizing iris scissors.
Bi-Rhombic Flap Text: The defect edges were debeveled with a #15 scalpel blade.  Given the location of the defect and the proximity to free margins a bi-rhombic flap was deemed most appropriate.  Using a sterile surgical marker, an appropriate rhombic flap was drawn incorporating the defect. The area thus outlined was incised deep to adipose tissue with a #15 scalpel blade.  The skin margins were undermined to an appropriate distance in all directions utilizing iris scissors.
Helical Rim Advancement Flap Text: The defect edges were debeveled with a #15 blade scalpel.  Given the location of the defect and the proximity to free margins (helical rim) a double helical rim advancement flap was deemed most appropriate.  Using a sterile surgical marker, the appropriate advancement flaps were drawn incorporating the defect and placing the expected incisions between the helical rim and antihelix where possible.  The area thus outlined was incised through and through with a #15 scalpel blade.  With a skin hook and iris scissors, the flaps were gently and sharply undermined and freed up.
Bilateral Helical Rim Advancement Flap Text: The defect edges were debeveled with a #15 blade scalpel.  Given the location of the defect and the proximity to free margins (helical rim) a bilateral helical rim advancement flap was deemed most appropriate.  Using a sterile surgical marker, the appropriate advancement flaps were drawn incorporating the defect and placing the expected incisions between the helical rim and antihelix where possible.  The area thus outlined was incised through and through with a #15 scalpel blade.  With a skin hook and iris scissors, the flaps were gently and sharply undermined and freed up.
Ear Star Wedge Flap Text: The defect edges were debeveled with a #15 blade scalpel.  Given the location of the defect and the proximity to free margins (helical rim) an ear star wedge flap was deemed most appropriate.  Using a sterile surgical marker, the appropriate flap was drawn incorporating the defect and placing the expected incisions between the helical rim and antihelix where possible.  The area thus outlined was incised through and through with a #15 scalpel blade.
Banner Transposition Flap Text: The defect edges were debeveled with a #15 scalpel blade.  Given the location of the defect and the proximity to free margins a Banner transposition flap was deemed most appropriate.  Using a sterile surgical marker, an appropriate flap drawn around the defect. The area thus outlined was incised deep to adipose tissue with a #15 scalpel blade.  The skin margins were undermined to an appropriate distance in all directions utilizing iris scissors.
Bilobed Flap Text: The defect edges were debeveled with a #15 scalpel blade.  Given the location of the defect and the proximity to free margins a bilobe flap was deemed most appropriate.  Using a sterile surgical marker, an appropriate bilobe flap drawn around the defect.    The area thus outlined was incised deep to adipose tissue with a #15 scalpel blade.  The skin margins were undermined to an appropriate distance in all directions utilizing iris scissors.
Bilobed Transposition Flap Text: The defect edges were debeveled with a #15 scalpel blade.  Given the location of the defect and the proximity to free margins a bilobed transposition flap was deemed most appropriate.  Using a sterile surgical marker, an appropriate bilobe flap drawn around the defect.    The area thus outlined was incised deep to adipose tissue with a #15 scalpel blade.  The skin margins were undermined to an appropriate distance in all directions utilizing iris scissors.
Trilobed Flap Text: The defect edges were debeveled with a #15 scalpel blade.  Given the location of the defect and the proximity to free margins a trilobed flap was deemed most appropriate.  Using a sterile surgical marker, an appropriate trilobed flap drawn around the defect.    The area thus outlined was incised deep to adipose tissue with a #15 scalpel blade.  The skin margins were undermined to an appropriate distance in all directions utilizing iris scissors.
Dorsal Nasal Flap Text: The defect edges were debeveled with a #15 scalpel blade.  Given the location of the defect and the proximity to free margins a dorsal nasal flap was deemed most appropriate.  Using a sterile surgical marker, an appropriate dorsal nasal flap was drawn around the defect.    The area thus outlined was incised deep to adipose tissue with a #15 scalpel blade.  The skin margins were undermined to an appropriate distance in all directions utilizing iris scissors.
Island Pedicle Flap Text: The defect edges were debeveled with a #15 scalpel blade.  Given the location of the defect, shape of the defect and the proximity to free margins an island pedicle advancement flap was deemed most appropriate.  Using a sterile surgical marker, an appropriate advancement flap was drawn incorporating the defect, outlining the appropriate donor tissue and placing the expected incisions within the relaxed skin tension lines where possible.    The area thus outlined was incised deep to adipose tissue with a #15 scalpel blade.  The skin margins were undermined to an appropriate distance in all directions around the primary defect and laterally outward around the island pedicle utilizing iris scissors.  There was minimal undermining beneath the pedicle flap.
Island Pedicle Flap With Canthal Suspension Text: The defect edges were debeveled with a #15 scalpel blade.  Given the location of the defect, shape of the defect and the proximity to free margins an island pedicle advancement flap was deemed most appropriate.  Using a sterile surgical marker, an appropriate advancement flap was drawn incorporating the defect, outlining the appropriate donor tissue and placing the expected incisions within the relaxed skin tension lines where possible. The area thus outlined was incised deep to adipose tissue with a #15 scalpel blade.  The skin margins were undermined to an appropriate distance in all directions around the primary defect and laterally outward around the island pedicle utilizing iris scissors.  There was minimal undermining beneath the pedicle flap. A suspension suture was placed in the canthal tendon to prevent tension and prevent ectropion.
Alar Island Pedicle Flap Text: The defect edges were debeveled with a #15 scalpel blade.  Given the location of the defect, shape of the defect and the proximity to the alar rim an island pedicle advancement flap was deemed most appropriate.  Using a sterile surgical marker, an appropriate advancement flap was drawn incorporating the defect, outlining the appropriate donor tissue and placing the expected incisions within the nasal ala running parallel to the alar rim. The area thus outlined was incised with a #15 scalpel blade.  The skin margins were undermined minimally to an appropriate distance in all directions around the primary defect and laterally outward around the island pedicle utilizing iris scissors.  There was minimal undermining beneath the pedicle flap.
Double Island Pedicle Flap Text: The defect edges were debeveled with a #15 scalpel blade.  Given the location of the defect, shape of the defect and the proximity to free margins a double island pedicle advancement flap was deemed most appropriate.  Using a sterile surgical marker, an appropriate advancement flap was drawn incorporating the defect, outlining the appropriate donor tissue and placing the expected incisions within the relaxed skin tension lines where possible.    The area thus outlined was incised deep to adipose tissue with a #15 scalpel blade.  The skin margins were undermined to an appropriate distance in all directions around the primary defect and laterally outward around the island pedicle utilizing iris scissors.  There was minimal undermining beneath the pedicle flap.
Island Pedicle Flap-Requiring Vessel Identification Text: The defect edges were debeveled with a #15 scalpel blade.  Given the location of the defect, shape of the defect and the proximity to free margins an island pedicle advancement flap was deemed most appropriate.  Using a sterile surgical marker, an appropriate advancement flap was drawn, based on the axial vessel mentioned above, incorporating the defect, outlining the appropriate donor tissue and placing the expected incisions within the relaxed skin tension lines where possible.    The area thus outlined was incised deep to adipose tissue with a #15 scalpel blade.  The skin margins were undermined to an appropriate distance in all directions around the primary defect and laterally outward around the island pedicle utilizing iris scissors.  There was minimal undermining beneath the pedicle flap.
Keystone Flap Text: The defect edges were debeveled with a #15 scalpel blade.  Given the location of the defect, shape of the defect a keystone flap was deemed most appropriate.  Using a sterile surgical marker, an appropriate keystone flap was drawn incorporating the defect, outlining the appropriate donor tissue and placing the expected incisions within the relaxed skin tension lines where possible. The area thus outlined was incised deep to adipose tissue with a #15 scalpel blade.  The skin margins were undermined to an appropriate distance in all directions around the primary defect and laterally outward around the flap utilizing iris scissors.
O-T Plasty Text: The defect edges were debeveled with a #15 scalpel blade.  Given the location of the defect, shape of the defect and the proximity to free margins an O-T plasty was deemed most appropriate.  Using a sterile surgical marker, an appropriate O-T plasty was drawn incorporating the defect and placing the expected incisions within the relaxed skin tension lines where possible.    The area thus outlined was incised deep to adipose tissue with a #15 scalpel blade.  The skin margins were undermined to an appropriate distance in all directions utilizing iris scissors.
O-Z Plasty Text: The defect edges were debeveled with a #15 scalpel blade.  Given the location of the defect, shape of the defect and the proximity to free margins an O-Z plasty (double transposition flap) was deemed most appropriate.  Using a sterile surgical marker, the appropriate transposition flaps were drawn incorporating the defect and placing the expected incisions within the relaxed skin tension lines where possible.    The area thus outlined was incised deep to adipose tissue with a #15 scalpel blade.  The skin margins were undermined to an appropriate distance in all directions utilizing iris scissors.  Hemostasis was achieved with electrocautery.  The flaps were then transposed into place, one clockwise and the other counterclockwise, and anchored with interrupted buried subcutaneous sutures.
Double O-Z Plasty Text: The defect edges were debeveled with a #15 scalpel blade.  Given the location of the defect, shape of the defect and the proximity to free margins a Double O-Z plasty (double transposition flap) was deemed most appropriate.  Using a sterile surgical marker, the appropriate transposition flaps were drawn incorporating the defect and placing the expected incisions within the relaxed skin tension lines where possible. The area thus outlined was incised deep to adipose tissue with a #15 scalpel blade.  The skin margins were undermined to an appropriate distance in all directions utilizing iris scissors.  Hemostasis was achieved with electrocautery.  The flaps were then transposed into place, one clockwise and the other counterclockwise, and anchored with interrupted buried subcutaneous sutures.
V-Y Plasty Text: The defect edges were debeveled with a #15 scalpel blade.  Given the location of the defect, shape of the defect and the proximity to free margins an V-Y advancement flap was deemed most appropriate.  Using a sterile surgical marker, an appropriate advancement flap was drawn incorporating the defect and placing the expected incisions within the relaxed skin tension lines where possible.    The area thus outlined was incised deep to adipose tissue with a #15 scalpel blade.  The skin margins were undermined to an appropriate distance in all directions utilizing iris scissors.
H Plasty Text: Given the location of the defect, shape of the defect and the proximity to free margins a H-plasty was deemed most appropriate for repair.  Using a sterile surgical marker, the appropriate advancement arms of the H-plasty were drawn incorporating the defect and placing the expected incisions within the relaxed skin tension lines where possible. The area thus outlined was incised deep to adipose tissue with a #15 scalpel blade. The skin margins were undermined to an appropriate distance in all directions utilizing iris scissors.  The opposing advancement arms were then advanced into place in opposite direction and anchored with interrupted buried subcutaneous sutures.
W Plasty Text: The lesion was extirpated to the level of the fat with a #15 scalpel blade.  Given the location of the defect, shape of the defect and the proximity to free margins a W-plasty was deemed most appropriate for repair.  Using a sterile surgical marker, the appropriate transposition arms of the W-plasty were drawn incorporating the defect and placing the expected incisions within the relaxed skin tension lines where possible.    The area thus outlined was incised deep to adipose tissue with a #15 scalpel blade.  The skin margins were undermined to an appropriate distance in all directions utilizing iris scissors.  The opposing transposition arms were then transposed into place in opposite direction and anchored with interrupted buried subcutaneous sutures.
Z Plasty Text: The lesion was extirpated to the level of the fat with a #15 scalpel blade.  Given the location of the defect, shape of the defect and the proximity to free margins a Z-plasty was deemed most appropriate for repair.  Using a sterile surgical marker, the appropriate transposition arms of the Z-plasty were drawn incorporating the defect and placing the expected incisions within the relaxed skin tension lines where possible.    The area thus outlined was incised deep to adipose tissue with a #15 scalpel blade.  The skin margins were undermined to an appropriate distance in all directions utilizing iris scissors.  The opposing transposition arms were then transposed into place in opposite direction and anchored with interrupted buried subcutaneous sutures.
Zygomaticofacial Flap Text: Given the location of the defect, shape of the defect and the proximity to free margins a zygomaticofacial flap was deemed most appropriate for repair.  Using a sterile surgical marker, the appropriate flap was drawn incorporating the defect and placing the expected incisions within the relaxed skin tension lines where possible. The area thus outlined was incised deep to adipose tissue with a #15 scalpel blade with preservation of a vascular pedicle.  The skin margins were undermined to an appropriate distance in all directions utilizing iris scissors.  The flap was then placed into the defect and anchored with interrupted buried subcutaneous sutures.
Cheek Interpolation Flap Text: A decision was made to reconstruct the defect utilizing an interpolation axial flap and a staged reconstruction.  A telfa template was made of the defect.  This telfa template was then used to outline the Cheek Interpolation flap.  The donor area for the pedicle flap was then injected with anesthesia.  The flap was excised through the skin and subcutaneous tissue down to the layer of the underlying musculature.  The interpolation flap was carefully excised within this deep plane to maintain its blood supply.  The edges of the donor site were undermined.   The donor site was closed in a primary fashion.  The pedicle was then rotated into position and sutured.  Once the tube was sutured into place, adequate blood supply was confirmed with blanching and refill.  The pedicle was then wrapped with xeroform gauze and dressed appropriately with a telfa and gauze bandage to ensure continued blood supply and protect the attached pedicle.
Cheek-To-Nose Interpolation Flap Text: A decision was made to reconstruct the defect utilizing an interpolation axial flap and a staged reconstruction.  A telfa template was made of the defect.  This telfa template was then used to outline the Cheek-To-Nose Interpolation flap.  The donor area for the pedicle flap was then injected with anesthesia.  The flap was excised through the skin and subcutaneous tissue down to the layer of the underlying musculature.  The interpolation flap was carefully excised within this deep plane to maintain its blood supply.  The edges of the donor site were undermined.   The donor site was closed in a primary fashion.  The pedicle was then rotated into position and sutured.  Once the tube was sutured into place, adequate blood supply was confirmed with blanching and refill.  The pedicle was then wrapped with xeroform gauze and dressed appropriately with a telfa and gauze bandage to ensure continued blood supply and protect the attached pedicle.
Interpolation Flap Text: A decision was made to reconstruct the defect utilizing an interpolation axial flap and a staged reconstruction.  A telfa template was made of the defect.  This telfa template was then used to outline the interpolation flap.  The donor area for the pedicle flap was then injected with anesthesia.  The flap was excised through the skin and subcutaneous tissue down to the layer of the underlying musculature.  The interpolation flap was carefully excised within this deep plane to maintain its blood supply.  The edges of the donor site were undermined.   The donor site was closed in a primary fashion.  The pedicle was then rotated into position and sutured.  Once the tube was sutured into place, adequate blood supply was confirmed with blanching and refill.  The pedicle was then wrapped with xeroform gauze and dressed appropriately with a telfa and gauze bandage to ensure continued blood supply and protect the attached pedicle.
Melolabial Interpolation Flap Text: A decision was made to reconstruct the defect utilizing an interpolation axial flap and a staged reconstruction.  A telfa template was made of the defect.  This telfa template was then used to outline the melolabial interpolation flap.  The donor area for the pedicle flap was then injected with anesthesia.  The flap was excised through the skin and subcutaneous tissue down to the layer of the underlying musculature.  The pedicle flap was carefully excised within this deep plane to maintain its blood supply.  The edges of the donor site were undermined.   The donor site was closed in a primary fashion.  The pedicle was then rotated into position and sutured.  Once the tube was sutured into place, adequate blood supply was confirmed with blanching and refill.  The pedicle was then wrapped with xeroform gauze and dressed appropriately with a telfa and gauze bandage to ensure continued blood supply and protect the attached pedicle.
Mastoid Interpolation Flap Text: A decision was made to reconstruct the defect utilizing an interpolation axial flap and a staged reconstruction.  A telfa template was made of the defect.  This telfa template was then used to outline the mastoid interpolation flap.  The donor area for the pedicle flap was then injected with anesthesia.  The flap was excised through the skin and subcutaneous tissue down to the layer of the underlying musculature.  The pedicle flap was carefully excised within this deep plane to maintain its blood supply.  The edges of the donor site were undermined.   The donor site was closed in a primary fashion.  The pedicle was then rotated into position and sutured.  Once the tube was sutured into place, adequate blood supply was confirmed with blanching and refill.  The pedicle was then wrapped with xeroform gauze and dressed appropriately with a telfa and gauze bandage to ensure continued blood supply and protect the attached pedicle.
Posterior Auricular Interpolation Flap Text: A decision was made to reconstruct the defect utilizing an interpolation axial flap and a staged reconstruction.  A telfa template was made of the defect.  This telfa template was then used to outline the posterior auricular interpolation flap.  The donor area for the pedicle flap was then injected with anesthesia.  The flap was excised through the skin and subcutaneous tissue down to the layer of the underlying musculature.  The pedicle flap was carefully excised within this deep plane to maintain its blood supply.  The edges of the donor site were undermined.   The donor site was closed in a primary fashion.  The pedicle was then rotated into position and sutured.  Once the tube was sutured into place, adequate blood supply was confirmed with blanching and refill.  The pedicle was then wrapped with xeroform gauze and dressed appropriately with a telfa and gauze bandage to ensure continued blood supply and protect the attached pedicle.
Paramedian Forehead Flap Text: A decision was made to reconstruct the defect utilizing an interpolation axial flap and a staged reconstruction.  A telfa template was made of the defect.  This telfa template was then used to outline the paramedian forehead pedicle flap.  The donor area for the pedicle flap was then injected with anesthesia.  The flap was excised through the skin and subcutaneous tissue down to the layer of the underlying musculature.  The pedicle flap was carefully excised within this deep plane to maintain its blood supply.  The edges of the donor site were undermined.   The donor site was closed in a primary fashion.  The pedicle was then rotated into position and sutured.  Once the tube was sutured into place, adequate blood supply was confirmed with blanching and refill.  The pedicle was then wrapped with xeroform gauze and dressed appropriately with a telfa and gauze bandage to ensure continued blood supply and protect the attached pedicle.
Lip Wedge Excision Repair Text: Given the location of the defect and the proximity to free margins a full thickness wedge repair was deemed most appropriate.  Using a sterile surgical marker, the appropriate repair was drawn incorporating the defect and placing the expected incisions perpendicular to the vermilion border.  The vermilion border was also meticulously outlined to ensure appropriate reapproximation during the repair.  The area thus outlined was incised through and through with a #15 scalpel blade.  The muscularis and dermis were reaproximated with deep sutures following hemostasis. Care was taken to realign the vermilion border before proceeding with the superficial closure.  Once the vermilion was realigned the superfical and mucosal closure was finished.
Ftsg Text: The defect edges were debeveled with a #15 scalpel blade.  Given the location of the defect, shape of the defect and the proximity to free margins a full thickness skin graft was deemed most appropriate.  Using a sterile surgical marker, the primary defect shape was transferred to the donor site. The area thus outlined was incised deep to adipose tissue with a #15 scalpel blade.  The harvested graft was then trimmed of adipose tissue until only dermis and epidermis was left.  The skin margins of the secondary defect were undermined to an appropriate distance in all directions utilizing iris scissors.  The secondary defect was closed with interrupted buried subcutaneous sutures.  The skin edges were then re-apposed with running  sutures.  The skin graft was then placed in the primary defect and oriented appropriately.
Split-Thickness Skin Graft Text: The defect edges were debeveled with a #15 scalpel blade.  Given the location of the defect, shape of the defect and the proximity to free margins a split thickness skin graft was deemed most appropriate.  Using a sterile surgical marker, the primary defect shape was transferred to the donor site. The split thickness graft was then harvested.  The skin graft was then placed in the primary defect and oriented appropriately.
Burow's Graft Text: The defect edges were debeveled with a #15 scalpel blade.  Given the location of the defect, shape of the defect, the proximity to free margins and the presence of a standing cone deformity a Burow's skin graft was deemed most appropriate. The standing cone was removed and this tissue was then trimmed to the shape of the primary defect. The adipose tissue was also removed until only dermis and epidermis were left.  The skin margins of the secondary defect were undermined to an appropriate distance in all directions utilizing iris scissors.  The secondary defect was closed with interrupted buried subcutaneous sutures.  The skin edges were then re-apposed with running  sutures.  The skin graft was then placed in the primary defect and oriented appropriately.
Cartilage Graft Text: The defect edges were debeveled with a #15 scalpel blade.  Given the location of the defect, shape of the defect, the fact the defect involved a full thickness cartilage defect a cartilage graft was deemed most appropriate.  An appropriate donor site was identified, cleansed, and anesthetized. The cartilage graft was then harvested and transferred to the recipient site, oriented appropriately and then sutured into place.  The secondary defect was then repaired using a primary closure.
Composite Graft Text: The defect edges were debeveled with a #15 scalpel blade.  Given the location of the defect, shape of the defect, the proximity to free margins and the fact the defect was full thickness a composite graft was deemed most appropriate.  The defect was outline and then transferred to the donor site.  A full thickness graft was then excised from the donor site. The graft was then placed in the primary defect, oriented appropriately and then sutured into place.  The secondary defect was then repaired using a primary closure.
Epidermal Autograft Text: The defect edges were debeveled with a #15 scalpel blade.  Given the location of the defect, shape of the defect and the proximity to free margins an epidermal autograft was deemed most appropriate.  Using a sterile surgical marker, the primary defect shape was transferred to the donor site. The epidermal graft was then harvested.  The skin graft was then placed in the primary defect and oriented appropriately.
Dermal Autograft Text: The defect edges were debeveled with a #15 scalpel blade.  Given the location of the defect, shape of the defect and the proximity to free margins a dermal autograft was deemed most appropriate.  Using a sterile surgical marker, the primary defect shape was transferred to the donor site. The area thus outlined was incised deep to adipose tissue with a #15 scalpel blade.  The harvested graft was then trimmed of adipose and epidermal tissue until only dermis was left.  The skin graft was then placed in the primary defect and oriented appropriately.
Skin Substitute Text: The defect edges were debeveled with a #15 scalpel blade.  Given the location of the defect, shape of the defect and the proximity to free margins a skin substitute graft was deemed most appropriate.  The graft material was trimmed to fit the size of the defect. The graft was then placed in the primary defect and oriented appropriately.
Tissue Cultured Epidermal Autograft Text: The defect edges were debeveled with a #15 scalpel blade.  Given the location of the defect, shape of the defect and the proximity to free margins a tissue cultured epidermal autograft was deemed most appropriate.  The graft was then trimmed to fit the size of the defect.  The graft was then placed in the primary defect and oriented appropriately.
Xenograft Text: The defect edges were debeveled with a #15 scalpel blade.  Given the location of the defect, shape of the defect and the proximity to free margins a xenograft was deemed most appropriate.  The graft was then trimmed to fit the size of the defect.  The graft was then placed in the primary defect and oriented appropriately.
Purse String (Intermediate) Text: Given the location of the defect and the characteristics of the surrounding skin a purse string intermediate closure was deemed most appropriate.  Undermining was performed circumferentially around the surgical defect.  A purse string suture was then placed and tightened.
Purse String (Simple) Text: Given the location of the defect and the characteristics of the surrounding skin a purse string simple closure was deemed most appropriate.  Undermining was performed circumferentially around the surgical defect.  A purse string suture was then placed and tightened.
Complex Repair And Single Advancement Flap Text: The defect edges were debeveled with a #15 scalpel blade.  The primary defect was closed partially with a complex linear closure.  Given the location of the remaining defect, shape of the defect and the proximity to free margins a single advancement flap was deemed most appropriate for complete closure of the defect.  Using a sterile surgical marker, an appropriate advancement flap was drawn incorporating the defect and placing the expected incisions within the relaxed skin tension lines where possible.    The area thus outlined was incised deep to adipose tissue with a #15 scalpel blade.  The skin margins were undermined to an appropriate distance in all directions utilizing iris scissors.
Complex Repair And Double Advancement Flap Text: The defect edges were debeveled with a #15 scalpel blade.  The primary defect was closed partially with a complex linear closure.  Given the location of the remaining defect, shape of the defect and the proximity to free margins a double advancement flap was deemed most appropriate for complete closure of the defect.  Using a sterile surgical marker, an appropriate advancement flap was drawn incorporating the defect and placing the expected incisions within the relaxed skin tension lines where possible.    The area thus outlined was incised deep to adipose tissue with a #15 scalpel blade.  The skin margins were undermined to an appropriate distance in all directions utilizing iris scissors.
Complex Repair And Modified Advancement Flap Text: The defect edges were debeveled with a #15 scalpel blade.  The primary defect was closed partially with a complex linear closure.  Given the location of the remaining defect, shape of the defect and the proximity to free margins a modified advancement flap was deemed most appropriate for complete closure of the defect.  Using a sterile surgical marker, an appropriate advancement flap was drawn incorporating the defect and placing the expected incisions within the relaxed skin tension lines where possible.    The area thus outlined was incised deep to adipose tissue with a #15 scalpel blade.  The skin margins were undermined to an appropriate distance in all directions utilizing iris scissors.
Complex Repair And A-T Advancement Flap Text: The defect edges were debeveled with a #15 scalpel blade.  The primary defect was closed partially with a complex linear closure.  Given the location of the remaining defect, shape of the defect and the proximity to free margins an A-T advancement flap was deemed most appropriate for complete closure of the defect.  Using a sterile surgical marker, an appropriate advancement flap was drawn incorporating the defect and placing the expected incisions within the relaxed skin tension lines where possible.    The area thus outlined was incised deep to adipose tissue with a #15 scalpel blade.  The skin margins were undermined to an appropriate distance in all directions utilizing iris scissors.
Complex Repair And O-T Advancement Flap Text: The defect edges were debeveled with a #15 scalpel blade.  The primary defect was closed partially with a complex linear closure.  Given the location of the remaining defect, shape of the defect and the proximity to free margins an O-T advancement flap was deemed most appropriate for complete closure of the defect.  Using a sterile surgical marker, an appropriate advancement flap was drawn incorporating the defect and placing the expected incisions within the relaxed skin tension lines where possible.    The area thus outlined was incised deep to adipose tissue with a #15 scalpel blade.  The skin margins were undermined to an appropriate distance in all directions utilizing iris scissors.
Complex Repair And O-L Flap Text: The defect edges were debeveled with a #15 scalpel blade.  The primary defect was closed partially with a complex linear closure.  Given the location of the remaining defect, shape of the defect and the proximity to free margins an O-L flap was deemed most appropriate for complete closure of the defect.  Using a sterile surgical marker, an appropriate flap was drawn incorporating the defect and placing the expected incisions within the relaxed skin tension lines where possible.    The area thus outlined was incised deep to adipose tissue with a #15 scalpel blade.  The skin margins were undermined to an appropriate distance in all directions utilizing iris scissors.
Complex Repair And Bilobe Flap Text: The defect edges were debeveled with a #15 scalpel blade.  The primary defect was closed partially with a complex linear closure.  Given the location of the remaining defect, shape of the defect and the proximity to free margins a bilobe flap was deemed most appropriate for complete closure of the defect.  Using a sterile surgical marker, an appropriate advancement flap was drawn incorporating the defect and placing the expected incisions within the relaxed skin tension lines where possible.    The area thus outlined was incised deep to adipose tissue with a #15 scalpel blade.  The skin margins were undermined to an appropriate distance in all directions utilizing iris scissors.
Complex Repair And Melolabial Flap Text: The defect edges were debeveled with a #15 scalpel blade.  The primary defect was closed partially with a complex linear closure.  Given the location of the remaining defect, shape of the defect and the proximity to free margins a melolabial flap was deemed most appropriate for complete closure of the defect.  Using a sterile surgical marker, an appropriate advancement flap was drawn incorporating the defect and placing the expected incisions within the relaxed skin tension lines where possible.    The area thus outlined was incised deep to adipose tissue with a #15 scalpel blade.  The skin margins were undermined to an appropriate distance in all directions utilizing iris scissors.
Complex Repair And Rotation Flap Text: The defect edges were debeveled with a #15 scalpel blade.  The primary defect was closed partially with a complex linear closure.  Given the location of the remaining defect, shape of the defect and the proximity to free margins a rotation flap was deemed most appropriate for complete closure of the defect.  Using a sterile surgical marker, an appropriate advancement flap was drawn incorporating the defect and placing the expected incisions within the relaxed skin tension lines where possible.    The area thus outlined was incised deep to adipose tissue with a #15 scalpel blade.  The skin margins were undermined to an appropriate distance in all directions utilizing iris scissors.
Complex Repair And Rhombic Flap Text: The defect edges were debeveled with a #15 scalpel blade.  The primary defect was closed partially with a complex linear closure.  Given the location of the remaining defect, shape of the defect and the proximity to free margins a rhombic flap was deemed most appropriate for complete closure of the defect.  Using a sterile surgical marker, an appropriate advancement flap was drawn incorporating the defect and placing the expected incisions within the relaxed skin tension lines where possible.    The area thus outlined was incised deep to adipose tissue with a #15 scalpel blade.  The skin margins were undermined to an appropriate distance in all directions utilizing iris scissors.
Complex Repair And Transposition Flap Text: The defect edges were debeveled with a #15 scalpel blade.  The primary defect was closed partially with a complex linear closure.  Given the location of the remaining defect, shape of the defect and the proximity to free margins a transposition flap was deemed most appropriate for complete closure of the defect.  Using a sterile surgical marker, an appropriate advancement flap was drawn incorporating the defect and placing the expected incisions within the relaxed skin tension lines where possible.    The area thus outlined was incised deep to adipose tissue with a #15 scalpel blade.  The skin margins were undermined to an appropriate distance in all directions utilizing iris scissors.
Complex Repair And V-Y Plasty Text: The defect edges were debeveled with a #15 scalpel blade.  The primary defect was closed partially with a complex linear closure.  Given the location of the remaining defect, shape of the defect and the proximity to free margins a V-Y plasty was deemed most appropriate for complete closure of the defect.  Using a sterile surgical marker, an appropriate advancement flap was drawn incorporating the defect and placing the expected incisions within the relaxed skin tension lines where possible.    The area thus outlined was incised deep to adipose tissue with a #15 scalpel blade.  The skin margins were undermined to an appropriate distance in all directions utilizing iris scissors.
Complex Repair And M Plasty Text: The defect edges were debeveled with a #15 scalpel blade.  The primary defect was closed partially with a complex linear closure.  Given the location of the remaining defect, shape of the defect and the proximity to free margins an M plasty was deemed most appropriate for complete closure of the defect.  Using a sterile surgical marker, an appropriate advancement flap was drawn incorporating the defect and placing the expected incisions within the relaxed skin tension lines where possible.    The area thus outlined was incised deep to adipose tissue with a #15 scalpel blade.  The skin margins were undermined to an appropriate distance in all directions utilizing iris scissors.
Complex Repair And Double M Plasty Text: The defect edges were debeveled with a #15 scalpel blade.  The primary defect was closed partially with a complex linear closure.  Given the location of the remaining defect, shape of the defect and the proximity to free margins a double M plasty was deemed most appropriate for complete closure of the defect.  Using a sterile surgical marker, an appropriate advancement flap was drawn incorporating the defect and placing the expected incisions within the relaxed skin tension lines where possible.    The area thus outlined was incised deep to adipose tissue with a #15 scalpel blade.  The skin margins were undermined to an appropriate distance in all directions utilizing iris scissors.
Complex Repair And W Plasty Text: The defect edges were debeveled with a #15 scalpel blade.  The primary defect was closed partially with a complex linear closure.  Given the location of the remaining defect, shape of the defect and the proximity to free margins a W plasty was deemed most appropriate for complete closure of the defect.  Using a sterile surgical marker, an appropriate advancement flap was drawn incorporating the defect and placing the expected incisions within the relaxed skin tension lines where possible.    The area thus outlined was incised deep to adipose tissue with a #15 scalpel blade.  The skin margins were undermined to an appropriate distance in all directions utilizing iris scissors.
Complex Repair And Z Plasty Text: The defect edges were debeveled with a #15 scalpel blade.  The primary defect was closed partially with a complex linear closure.  Given the location of the remaining defect, shape of the defect and the proximity to free margins a Z plasty was deemed most appropriate for complete closure of the defect.  Using a sterile surgical marker, an appropriate advancement flap was drawn incorporating the defect and placing the expected incisions within the relaxed skin tension lines where possible.    The area thus outlined was incised deep to adipose tissue with a #15 scalpel blade.  The skin margins were undermined to an appropriate distance in all directions utilizing iris scissors.
Complex Repair And Dorsal Nasal Flap Text: The defect edges were debeveled with a #15 scalpel blade.  The primary defect was closed partially with a complex linear closure.  Given the location of the remaining defect, shape of the defect and the proximity to free margins a dorsal nasal flap was deemed most appropriate for complete closure of the defect.  Using a sterile surgical marker, an appropriate flap was drawn incorporating the defect and placing the expected incisions within the relaxed skin tension lines where possible.    The area thus outlined was incised deep to adipose tissue with a #15 scalpel blade.  The skin margins were undermined to an appropriate distance in all directions utilizing iris scissors.
Complex Repair And Ftsg Text: The defect edges were debeveled with a #15 scalpel blade.  The primary defect was closed partially with a complex linear closure.  Given the location of the defect, shape of the defect and the proximity to free margins a full thickness skin graft was deemed most appropriate to repair the remaining defect.  The graft was trimmed to fit the size of the remaining defect.  The graft was then placed in the primary defect, oriented appropriately, and sutured into place.
Complex Repair And Burow's Graft Text: The defect edges were debeveled with a #15 scalpel blade.  The primary defect was closed partially with a complex linear closure.  Given the location of the defect, shape of the defect, the proximity to free margins and the presence of a standing cone deformity a Burow's graft was deemed most appropriate to repair the remaining defect.  The graft was trimmed to fit the size of the remaining defect.  The graft was then placed in the primary defect, oriented appropriately, and sutured into place.
Complex Repair And Split-Thickness Skin Graft Text: The defect edges were debeveled with a #15 scalpel blade.  The primary defect was closed partially with a complex linear closure.  Given the location of the defect, shape of the defect and the proximity to free margins a split thickness skin graft was deemed most appropriate to repair the remaining defect.  The graft was trimmed to fit the size of the remaining defect.  The graft was then placed in the primary defect, oriented appropriately, and sutured into place.
Complex Repair And Epidermal Autograft Text: The defect edges were debeveled with a #15 scalpel blade.  The primary defect was closed partially with a complex linear closure.  Given the location of the defect, shape of the defect and the proximity to free margins an epidermal autograft was deemed most appropriate to repair the remaining defect.  The graft was trimmed to fit the size of the remaining defect.  The graft was then placed in the primary defect, oriented appropriately, and sutured into place.
Complex Repair And Dermal Autograft Text: The defect edges were debeveled with a #15 scalpel blade.  The primary defect was closed partially with a complex linear closure.  Given the location of the defect, shape of the defect and the proximity to free margins an dermal autograft was deemed most appropriate to repair the remaining defect.  The graft was trimmed to fit the size of the remaining defect.  The graft was then placed in the primary defect, oriented appropriately, and sutured into place.
Complex Repair And Tissue Cultured Epidermal Autograft Text: The defect edges were debeveled with a #15 scalpel blade.  The primary defect was closed partially with a complex linear closure.  Given the location of the defect, shape of the defect and the proximity to free margins an tissue cultured epidermal autograft was deemed most appropriate to repair the remaining defect.  The graft was trimmed to fit the size of the remaining defect.  The graft was then placed in the primary defect, oriented appropriately, and sutured into place.
Complex Repair And Xenograft Text: The defect edges were debeveled with a #15 scalpel blade.  The primary defect was closed partially with a complex linear closure.  Given the location of the defect, shape of the defect and the proximity to free margins a xenograft was deemed most appropriate to repair the remaining defect.  The graft was trimmed to fit the size of the remaining defect.  The graft was then placed in the primary defect, oriented appropriately, and sutured into place.
Complex Repair And Skin Substitute Graft Text: The defect edges were debeveled with a #15 scalpel blade.  The primary defect was closed partially with a complex linear closure.  Given the location of the remaining defect, shape of the defect and the proximity to free margins a skin substitute graft was deemed most appropriate to repair the remaining defect.  The graft was trimmed to fit the size of the remaining defect.  The graft was then placed in the primary defect, oriented appropriately, and sutured into place.
Consent was obtained from the patient. The risks and benefits to therapy were discussed in detail. Specifically, the risks of infection, scarring, bleeding, prolonged wound healing, incomplete removal, allergy to anesthesia, nerve injury and recurrence were addressed. Prior to the procedure, the treatment site was clearly identified and confirmed by the patient. All components of Universal Protocol/PAUSE Rule completed.
Post-Care Instructions: I reviewed with the patient in detail post-care instructions. Patient is not to engage in any heavy lifting, exercise, or swimming for the next 14 days. Should the patient develop any fevers, chills, bleeding, severe pain patient will contact the office immediately.
Home Suture Removal Text: Patient was provided a home suture removal kit and will remove their sutures at home.  If they have any questions or difficulties they will call the office.
Where Do You Want The Question To Include Opioid Counseling Located?: Case Summary Tab
Billing Type: Third-Party Bill
Information: Selecting Yes will display possible errors in your note based on the variables you have selected. This validation is only offered as a suggestion for you. PLEASE NOTE THAT THE VALIDATION TEXT WILL BE REMOVED WHEN YOU FINALIZE YOUR NOTE. IF YOU WANT TO FAX A PRELIMINARY NOTE YOU WILL NEED TO TOGGLE THIS TO 'NO' IF YOU DO NOT WANT IT IN YOUR FAXED NOTE.

## 2020-10-12 ENCOUNTER — APPOINTMENT (RX ONLY)
Dept: URBAN - METROPOLITAN AREA CLINIC 23 | Facility: CLINIC | Age: 55
Setting detail: DERMATOLOGY
End: 2020-10-12

## 2020-10-12 DIAGNOSIS — Z48.02 ENCOUNTER FOR REMOVAL OF SUTURES: ICD-10-CM

## 2020-10-12 DIAGNOSIS — D76.3 OTHER HISTIOCYTOSIS SYNDROMES: ICD-10-CM

## 2020-10-12 PROCEDURE — 99024 POSTOP FOLLOW-UP VISIT: CPT

## 2020-10-12 PROCEDURE — ? COUNSELING

## 2020-10-12 PROCEDURE — ? SUTURE REMOVAL (GLOBAL PERIOD)

## 2020-10-12 PROCEDURE — 99213 OFFICE O/P EST LOW 20 MIN: CPT

## 2020-10-12 PROCEDURE — ? ADDITIONAL NOTES

## 2020-10-12 PROCEDURE — ? ORDER TESTS

## 2020-10-12 ASSESSMENT — LOCATION SIMPLE DESCRIPTION DERM: LOCATION SIMPLE: RIGHT SHOULDER

## 2020-10-12 ASSESSMENT — LOCATION ZONE DERM: LOCATION ZONE: ARM

## 2020-10-12 ASSESSMENT — LOCATION DETAILED DESCRIPTION DERM: LOCATION DETAILED: RIGHT POSTERIOR SHOULDER

## 2020-10-12 NOTE — PROCEDURE: SUTURE REMOVAL (GLOBAL PERIOD)
Detail Level: Detailed
Add 78899 Cpt? (Important Note: In 2017 The Use Of 86297 Is Being Tracked By Cms To Determine Future Global Period Reimbursement For Global Periods): yes

## 2021-02-22 ENCOUNTER — DOCUMENTATION (OUTPATIENT)
Dept: CARDIOLOGY | Facility: CLINIC | Age: 56
End: 2021-02-22

## 2021-02-22 ENCOUNTER — OFFICE VISIT (OUTPATIENT)
Dept: CARDIOLOGY | Facility: CLINIC | Age: 56
End: 2021-02-22
Payer: COMMERCIAL

## 2021-02-22 VITALS
DIASTOLIC BLOOD PRESSURE: 66 MMHG | SYSTOLIC BLOOD PRESSURE: 124 MMHG | RESPIRATION RATE: 18 BRPM | WEIGHT: 243.2 LBS | HEART RATE: 84 BPM | BODY MASS INDEX: 41.52 KG/M2 | HEIGHT: 64 IN

## 2021-02-22 DIAGNOSIS — R94.31 ABNORMAL EKG: ICD-10-CM

## 2021-02-22 DIAGNOSIS — I10 ESSENTIAL HYPERTENSION: Primary | ICD-10-CM

## 2021-02-22 DIAGNOSIS — R06.09 DYSPNEA ON EXERTION: ICD-10-CM

## 2021-02-22 DIAGNOSIS — I25.10 CORONARY ARTERY DISEASE INVOLVING NATIVE CORONARY ARTERY OF NATIVE HEART WITHOUT ANGINA PECTORIS: ICD-10-CM

## 2021-02-22 DIAGNOSIS — E11.9 TYPE 2 DIABETES MELLITUS WITHOUT COMPLICATION, WITHOUT LONG-TERM CURRENT USE OF INSULIN (CMS/HCC): ICD-10-CM

## 2021-02-22 DIAGNOSIS — I73.9 PERIPHERAL VASCULAR DISEASE (CMS/HCC): ICD-10-CM

## 2021-02-22 PROBLEM — D76.3: Status: ACTIVE | Noted: 2020-11-03

## 2021-02-22 PROCEDURE — 99214 OFFICE O/P EST MOD 30 MIN: CPT | Performed by: INTERNAL MEDICINE

## 2021-02-22 PROCEDURE — 93000 ELECTROCARDIOGRAM COMPLETE: CPT | Performed by: INTERNAL MEDICINE

## 2021-02-22 RX ORDER — GLIPIZIDE 5 MG/1
5 TABLET, FILM COATED, EXTENDED RELEASE ORAL DAILY
COMMUNITY
Start: 2020-08-25 | End: 2021-10-11 | Stop reason: SDUPTHER

## 2021-02-22 NOTE — PROGRESS NOTES
Cardiology Office Visit     Patient ID: Mirela Lopez 55 y.o. female 1965  PCP: Semaj Reina MD   History Present Illness     Mirela Lopez returns to the office today for follow-up of her coronary artery disease status post non-ST segment elevation MI which occurred on vacation in [place] in August 2017.  She was admitted to the hospital and found to have a 95% mid and distal circumflex with thrombus as well as a 90% proximal circumflex stenosis.  She underwent successful NERI of all these lesions and had no other obstructive disease.  An aortogram at that time showed an occluded right iliac but subsequent evaluation after her return home demonstrated some patency and she was referred to vascular surgery.  Mirela underwent successful right common iliac stenting in October 2017.  I am very pleased that she stopped smoking and has continued to abstain from tobacco.    Today in the office she feels well.  She has not had any chest discomfort, PND or orthopnea.  She denies claudication.  She is working as a  both virtually and in person, but fortunately has an office where she can be alone and close her door.  She is anxious to be vaccinated for Covid.  Her EKG is unchanged.    Allergies/Medications   Allergies:Adhesive and Adhesive tape-silicones    Medications:  Outpatient Encounter Medications as of 2/22/2021:   •  aspirin 81 mg enteric coated tablet, Take 81 mg by mouth daily.  •  atorvastatin (LIPITOR) 80 mg tablet, Take 1 tablet (80 mg total) by mouth daily.  •  ferrous sulfate 134 mg (27 mg iron) tablet, Take 1 tablet by mouth daily with breakfast.    •  glipiZIDE (GLUCOTROL XL) 5 mg 24 hr tablet, Take 5 mg by mouth daily.  •  lisinopriL (PRINIVIL) 10 mg tablet, Take 1 tablet (10 mg total) by mouth daily.  •  metFORMIN XR (GLUCOPHAGE-XR) 750 mg 24 hr tablet, Take 2 tablets (1,500 mg total) by mouth daily. With evening meal.  •  metoprolol succinate XL (TOPROL-XL) 100 mg 24 hr tablet, Take  "1 tablet (100 mg total) by mouth daily.  •  nitroglycerin (NITROSTAT) 0.4 mg SL tablet, Place 1 tablet (0.4 mg total) under the tongue every 5 (five) minutes as needed for chest pain.  Physical Exam     Vitals:    02/22/21 0927   BP: 124/66   BP Location: Left upper arm   Patient Position: Sitting   Pulse: 84   Resp: 18   Weight: 110 kg (243 lb 3.2 oz)   Height: 1.626 m (5' 4\")     BP Readings from Last 3 Encounters:   02/22/21 124/66   08/17/20 120/80   11/18/19 140/80     Wt Readings from Last 3 Encounters:   02/22/21 110 kg (243 lb 3.2 oz)   08/17/20 107 kg (235 lb 6.4 oz)   07/22/20 109 kg (241 lb)      Physical Exam:  Constitutional: Obese white female in no apparent distress.   Neck: No JVD or hepatojugular reflux.  No bruit.  Cardiac: Normal S1 and S2, regular rate and rhythm, no S3.  No rub.  No ectopy.  There is a 1/6 systolic murmur at the left sternal border.  Lungs: Clear to auscultation bilaterally.  No wheezing.  Abdomen: Soft, nontender, positive normoactive bowel sounds.  No bruit.  Obese.  Extremities: No clubbing or cyanosis.  No calf tenderness.  No edema.  Distal pulses are intact.  Skin: Warm and dry.  No jaundice.  Neurologic: Awake, alert, oriented.  Moving all extremities.  Psychiatric: No agitation.    Labs/Imaging/Procedures   Labs  8/17/2020: WBC 6.4, hemoglobin 11.8, platelets 164,000, glucose 292, BUN 9, creatinine 0.68, sodium 137, potassium 4.4, chloride 100, CO2 21, alkaline phosphatase 119, otherwise LFTs normal.  Hemoglobin A1c 9.  Total cholesterol 103, triglycerides 172, HDL 31 and LDL 38 mg/dL.    Imaging  LE arterial duplex bilateral 7/22/2020: Triphasic waveforms throughout the lower extremity with at least two-vessel runoff to the foot.  The left lower extremity has triphasic waveforms with at least two-vessel runoff.  There is a probable stenosis at the origin of the profunda.  No significant change.      LE arterial duplex bilateral 7/17/2019: Triphasic and biphasic " waveforms throughout the right and left lower extremities.  No evidence of discrete stenosis or obstruction.       Exercise stress echocardiogram 4/19/2019: Very technically difficult study.  Exercise EKG negative for ischemia or arrhythmia.  Poor exercise capacity completing only stage I Abner protocol.  Exercise EKG negative for ischemia or arrhythmia at that workload.  At rest, normal LV size and function with LVEF 60-65%.  Basal mid inferior hypokinesis.  Mildly dilated RV with preserved function.  Trace TR.  Trace MR.  Trileaflet aortic valve.  No significant stenosis or insufficiency.  Prominent pericardial fat pad.  Immediately following exercise overall LV function improves.  Persistent basal inferior hypokinesis consistent with scar.  No ischemia.       PVR 1/9/2019: Right JOURDAN 1.12 at rest and 1.1 following exercise.  Left JOURDAN 0.95 at rest and 0.96 following exercise.  Normal segmental pressures and PVRs.       Periph Intervention 10/2017: Successful right iliac stent.     Abd Ao Duplex 9/2017: Elevated velocities right MICHAEL with monophasic flow.     Echo 9/2017: LVEF 65%, Top normal LV size with overall preserved function. No significant pulmonary hypertension by Doppler. Technically difficult study.     Cath 8/13/17: LAD tortuous, no significant disease. RCA dominant and patent. Left circumflex with 90% proximal lesion, 95% mid lesion and distal thrombus. Successful NERI ×2 left circumflex. Occluded right MICHAEL at origin with collaterals. Patent left MICHAEL and CFA    EKG  Assessment/Plan     1. Essential hypertension  Mirela has a well-controlled blood pressure and should remain on lisinopril 10 mg daily with Toprol- mg daily.     2. Peripheral vascular disease (CMS/HCC)  Mirela denies claudication and her examination is stable.  Continue aspirin and statin therapy.  She has achieved an LDL goal less than 70 mg/dL.  She has follow-up with Dr. Ugalde arranged in the next few months.     3. CAD in native  artery  No new anginal symptoms and EKG is unchanged.  Continue aspirin, atorvastatin, lisinopril and beta-blocker.  I have asked family to return to the office for an echocardiogram and exercise nuclear stress test.  Her last stress echo had poor image quality and is not adequate for visualization of all segments to rule out ischemia.     4. Abnormal EKG  EKG is abnormal but unchanged.  No additional recommendations at this time.     5.  Diabetes mellitus  Patient's lab work was reviewed after the visit and her last hemoglobin A1c was 9.  She is on metformin 1500 mg daily.  She states that she also had glipizide 5 mg daily added.  I recommended the following to the patient: lose weight, increase physical activity with a goal of 150 minutes of moderate intensity or 75 minutes vigorous intensity aerobic exercise per week, Mediterranean diet incorporating vegetables, fruits, whole grains, plant-based protein, lean animal protein and fish, decrease sugar intake including sweetened drinks and refined carbohydrates and consider addition of SGLT2 inhibitor given her cardiovascular history.  We discussed the cardiovascular benefits of these medications and she anticipates having follow-up blood work shortly.    Return in about 6 months (around 8/22/2021) for follow-up, earlier if any change in symptoms, and after studies have been completed.  Available medical records reviewed and updated including laboratory data, imaging studies, previous outpatient and inpatient records.  Counseling/education performed. Care everywhere utilized where appropriate.    Thank you for allowing me to participate in the care of this patient.  I hope this information is helpful.  Social distancing, masking, handwashing and careful monitoring for any COVID symptoms discussed with patient.      Zara Mccloud MD [facility], [facility]  2/22/2021  5:36 PM  This document was generated utilizing voice recognition technology. Please excuse any  typographical errors which may be present.

## 2021-02-22 NOTE — LETTER
February 22, 2021     Semaj Reina MD  [address] [place] [id number]    Patient: Mirela Lopez  YOB: 1965  Date of Visit: 2/22/2021      Dear Dr. Reina:    Thank you for referring Mirela Lopez to me for evaluation. Below are my notes for this consultation.    If you have questions, please do not hesitate to call me. I look forward to following your patient along with you.         Sincerely,        Zara Mccloud MD        CC: MD Oneyda Naik Erin, MD  2/22/2021  5:45 PM  Signed       Cardiology Office Visit     Patient ID: Mirela Lopez 55 y.o. female 1965  PCP: Semaj Reina MD   History Present Illness     Mirela Lopez returns to the office today for follow-up of her coronary artery disease status post non-ST segment elevation MI which occurred on vacation in [place] in August 2017.  She was admitted to the hospital and found to have a 95% mid and distal circumflex with thrombus as well as a 90% proximal circumflex stenosis.  She underwent successful NERI of all these lesions and had no other obstructive disease.  An aortogram at that time showed an occluded right iliac but subsequent evaluation after her return home demonstrated some patency and she was referred to vascular surgery.  Mirela underwent successful right common iliac stenting in October 2017.  I am very pleased that she stopped smoking and has continued to abstain from tobacco.    Today in the office she feels well.  She has not had any chest discomfort, PND or orthopnea.  She denies claudication.  She is working as a  both virtually and in person, but fortunately has an office where she can be alone and close her door.  She is anxious to be vaccinated for Covid.  Her EKG is unchanged.    Allergies/Medications   Allergies:Adhesive and Adhesive tape-silicones    Medications:  Outpatient Encounter Medications as of 2/22/2021:   •  aspirin 81 mg enteric coated tablet, Take 81  "mg by mouth daily.  •  atorvastatin (LIPITOR) 80 mg tablet, Take 1 tablet (80 mg total) by mouth daily.  •  ferrous sulfate 134 mg (27 mg iron) tablet, Take 1 tablet by mouth daily with breakfast.    •  glipiZIDE (GLUCOTROL XL) 5 mg 24 hr tablet, Take 5 mg by mouth daily.  •  lisinopriL (PRINIVIL) 10 mg tablet, Take 1 tablet (10 mg total) by mouth daily.  •  metFORMIN XR (GLUCOPHAGE-XR) 750 mg 24 hr tablet, Take 2 tablets (1,500 mg total) by mouth daily. With evening meal.  •  metoprolol succinate XL (TOPROL-XL) 100 mg 24 hr tablet, Take 1 tablet (100 mg total) by mouth daily.  •  nitroglycerin (NITROSTAT) 0.4 mg SL tablet, Place 1 tablet (0.4 mg total) under the tongue every 5 (five) minutes as needed for chest pain.  Physical Exam     Vitals:    02/22/21 0927   BP: 124/66   BP Location: Left upper arm   Patient Position: Sitting   Pulse: 84   Resp: 18   Weight: 110 kg (243 lb 3.2 oz)   Height: 1.626 m (5' 4\")     BP Readings from Last 3 Encounters:   02/22/21 124/66   08/17/20 120/80   11/18/19 140/80     Wt Readings from Last 3 Encounters:   02/22/21 110 kg (243 lb 3.2 oz)   08/17/20 107 kg (235 lb 6.4 oz)   07/22/20 109 kg (241 lb)      Physical Exam:  Constitutional: Obese white female in no apparent distress.   Neck: No JVD or hepatojugular reflux.  No bruit.  Cardiac: Normal S1 and S2, regular rate and rhythm, no S3.  No rub.  No ectopy.  There is a 1/6 systolic murmur at the left sternal border.  Lungs: Clear to auscultation bilaterally.  No wheezing.  Abdomen: Soft, nontender, positive normoactive bowel sounds.  No bruit.  Obese.  Extremities: No clubbing or cyanosis.  No calf tenderness.  No edema.  Distal pulses are intact.  Skin: Warm and dry.  No jaundice.  Neurologic: Awake, alert, oriented.  Moving all extremities.  Psychiatric: No agitation.    Labs/Imaging/Procedures   Labs  8/17/2020: WBC 6.4, hemoglobin 11.8, platelets 164,000, glucose 292, BUN 9, creatinine 0.68, sodium 137, potassium 4.4, " chloride 100, CO2 21, alkaline phosphatase 119, otherwise LFTs normal.  Hemoglobin A1c 9.  Total cholesterol 103, triglycerides 172, HDL 31 and LDL 38 mg/dL.    Imaging  LE arterial duplex bilateral 7/22/2020: Triphasic waveforms throughout the lower extremity with at least two-vessel runoff to the foot.  The left lower extremity has triphasic waveforms with at least two-vessel runoff.  There is a probable stenosis at the origin of the profunda.  No significant change.      LE arterial duplex bilateral 7/17/2019: Triphasic and biphasic waveforms throughout the right and left lower extremities.  No evidence of discrete stenosis or obstruction.       Exercise stress echocardiogram 4/19/2019: Very technically difficult study.  Exercise EKG negative for ischemia or arrhythmia.  Poor exercise capacity completing only stage I Abner protocol.  Exercise EKG negative for ischemia or arrhythmia at that workload.  At rest, normal LV size and function with LVEF 60-65%.  Basal mid inferior hypokinesis.  Mildly dilated RV with preserved function.  Trace TR.  Trace MR.  Trileaflet aortic valve.  No significant stenosis or insufficiency.  Prominent pericardial fat pad.  Immediately following exercise overall LV function improves.  Persistent basal inferior hypokinesis consistent with scar.  No ischemia.       PVR 1/9/2019: Right JOURDAN 1.12 at rest and 1.1 following exercise.  Left JOURDAN 0.95 at rest and 0.96 following exercise.  Normal segmental pressures and PVRs.       Periph Intervention 10/2017: Successful right iliac stent.     Abd Ao Duplex 9/2017: Elevated velocities right MICHAEL with monophasic flow.     Echo 9/2017: LVEF 65%, Top normal LV size with overall preserved function. No significant pulmonary hypertension by Doppler. Technically difficult study.     Cath 8/13/17: LAD tortuous, no significant disease. RCA dominant and patent. Left circumflex with 90% proximal lesion, 95% mid lesion and distal thrombus. Successful NERI ×2  left circumflex. Occluded right MICHAEL at origin with collaterals. Patent left MICHAEL and CFA    EKG  Assessment/Plan     1. Essential hypertension  Mirela has a well-controlled blood pressure and should remain on lisinopril 10 mg daily with Toprol- mg daily.     2. Peripheral vascular disease (CMS/HCC)  Mirela denies claudication and her examination is stable.  Continue aspirin and statin therapy.  She has achieved an LDL goal less than 70 mg/dL.  She has follow-up with Dr. Ugalde arranged in the next few months.     3. CAD in native artery  No new anginal symptoms and EKG is unchanged.  Continue aspirin, atorvastatin, lisinopril and beta-blocker.  I have asked family to return to the office for an echocardiogram and exercise nuclear stress test.  Her last stress echo had poor image quality and is not adequate for visualization of all segments to rule out ischemia.     4. Abnormal EKG  EKG is abnormal but unchanged.  No additional recommendations at this time.     5.  Diabetes mellitus  Patient's lab work was reviewed after the visit and her last hemoglobin A1c was 9.  She is on metformin 1500 mg daily.  She states that she also had glipizide 5 mg daily added.  I recommended the following to the patient: lose weight, increase physical activity with a goal of 150 minutes of moderate intensity or 75 minutes vigorous intensity aerobic exercise per week, Mediterranean diet incorporating vegetables, fruits, whole grains, plant-based protein, lean animal protein and fish, decrease sugar intake including sweetened drinks and refined carbohydrates and consider addition of SGLT2 inhibitor given her cardiovascular history.  We discussed the cardiovascular benefits of these medications and she anticipates having follow-up blood work shortly.    Return in about 6 months (around 8/22/2021) for follow-up, earlier if any change in symptoms, and after studies have been completed.  Available medical records reviewed and updated  including laboratory data, imaging studies, previous outpatient and inpatient records.  Counseling/education performed. Care everywhere utilized where appropriate.    Thank you for allowing me to participate in the care of this patient.  I hope this information is helpful.  Social distancing, masking, handwashing and careful monitoring for any COVID symptoms discussed with patient.      Zara Mccloud MD [facility], [facility]  2/22/2021  5:36 PM  This document was generated utilizing voice recognition technology. Please excuse any typographical errors which may be present.

## 2021-02-22 NOTE — LETTER
February 22, 2021     Semaj Reina MD  [address] [place] [id number]    Patient: Mirela Lopez  YOB: 1965  Date of Visit: 2/22/2021      Dear Dr. Reina:    Thank you for referring Mirela Lopez to me for evaluation. Below are my notes for this consultation.    If you have questions, please do not hesitate to call me. I look forward to following your patient along with you.         Sincerely,        Zara Mccloud MD        CC: MD Oneyda Naik Erin, MD  2/22/2021  5:44 PM  Sign when Signing Visit       Cardiology Office Visit     Patient ID: Mirela Lopez 55 y.o. female 1965  PCP: Semaj Reina MD   History Present Illness     Mirela Lopez returns to the office today for follow-up of her coronary artery disease status post non-ST segment elevation MI which occurred on vacation in [place] in August 2017.  She was admitted to the hospital and found to have a 95% mid and distal circumflex with thrombus as well as a 90% proximal circumflex stenosis.  She underwent successful NERI of all these lesions and had no other obstructive disease.  An aortogram at that time showed an occluded right iliac but subsequent evaluation after her return home demonstrated some patency and she was referred to vascular surgery.  Mirela underwent successful right common iliac stenting in October 2017.  I am very pleased that she stopped smoking and has continued to abstain from tobacco.    Today in the office she feels well.  She has not had any chest discomfort, PND or orthopnea.  She denies claudication.  She is working as a  both virtually and in person, but fortunately has an office where she can be alone and close her door.  She is anxious to be vaccinated for Covid.  Her EKG is unchanged.    Allergies/Medications   Allergies:Adhesive and Adhesive tape-silicones    Medications:  Outpatient Encounter Medications as of 2/22/2021:   •  aspirin 81 mg enteric coated  "tablet, Take 81 mg by mouth daily.  •  atorvastatin (LIPITOR) 80 mg tablet, Take 1 tablet (80 mg total) by mouth daily.  •  ferrous sulfate 134 mg (27 mg iron) tablet, Take 1 tablet by mouth daily with breakfast.    •  glipiZIDE (GLUCOTROL XL) 5 mg 24 hr tablet, Take 5 mg by mouth daily.  •  lisinopriL (PRINIVIL) 10 mg tablet, Take 1 tablet (10 mg total) by mouth daily.  •  metFORMIN XR (GLUCOPHAGE-XR) 750 mg 24 hr tablet, Take 2 tablets (1,500 mg total) by mouth daily. With evening meal.  •  metoprolol succinate XL (TOPROL-XL) 100 mg 24 hr tablet, Take 1 tablet (100 mg total) by mouth daily.  •  nitroglycerin (NITROSTAT) 0.4 mg SL tablet, Place 1 tablet (0.4 mg total) under the tongue every 5 (five) minutes as needed for chest pain.  Physical Exam     Vitals:    02/22/21 0927   BP: 124/66   BP Location: Left upper arm   Patient Position: Sitting   Pulse: 84   Resp: 18   Weight: 110 kg (243 lb 3.2 oz)   Height: 1.626 m (5' 4\")     BP Readings from Last 3 Encounters:   02/22/21 124/66   08/17/20 120/80   11/18/19 140/80     Wt Readings from Last 3 Encounters:   02/22/21 110 kg (243 lb 3.2 oz)   08/17/20 107 kg (235 lb 6.4 oz)   07/22/20 109 kg (241 lb)      Physical Exam:  Constitutional: Obese white female in no apparent distress.   Neck: No JVD or hepatojugular reflux.  No bruit.  Cardiac: Normal S1 and S2, regular rate and rhythm, no S3.  No rub.  No ectopy.  There is a 1/6 systolic murmur at the left sternal border.  Lungs: Clear to auscultation bilaterally.  No wheezing.  Abdomen: Soft, nontender, positive normoactive bowel sounds.  No bruit.  Obese.  Extremities: No clubbing or cyanosis.  No calf tenderness.  No edema.  Distal pulses are intact.  Skin: Warm and dry.  No jaundice.  Neurologic: Awake, alert, oriented.  Moving all extremities.  Psychiatric: No agitation.    Labs/Imaging/Procedures   Labs  8/17/2020: WBC 6.4, hemoglobin 11.8, platelets 164,000, glucose 292, BUN 9, creatinine 0.68, sodium 137, " potassium 4.4, chloride 100, CO2 21, alkaline phosphatase 119, otherwise LFTs normal.  Hemoglobin A1c 9.  Total cholesterol 103, triglycerides 172, HDL 31 and LDL 38 mg/dL.    Imaging  LE arterial duplex bilateral 7/22/2020: Triphasic waveforms throughout the lower extremity with at least two-vessel runoff to the foot.  The left lower extremity has triphasic waveforms with at least two-vessel runoff.  There is a probable stenosis at the origin of the profunda.  No significant change.      LE arterial duplex bilateral 7/17/2019: Triphasic and biphasic waveforms throughout the right and left lower extremities.  No evidence of discrete stenosis or obstruction.       Exercise stress echocardiogram 4/19/2019: Very technically difficult study.  Exercise EKG negative for ischemia or arrhythmia.  Poor exercise capacity completing only stage I Abner protocol.  Exercise EKG negative for ischemia or arrhythmia at that workload.  At rest, normal LV size and function with LVEF 60-65%.  Basal mid inferior hypokinesis.  Mildly dilated RV with preserved function.  Trace TR.  Trace MR.  Trileaflet aortic valve.  No significant stenosis or insufficiency.  Prominent pericardial fat pad.  Immediately following exercise overall LV function improves.  Persistent basal inferior hypokinesis consistent with scar.  No ischemia.       PVR 1/9/2019: Right JOURDAN 1.12 at rest and 1.1 following exercise.  Left JOURDAN 0.95 at rest and 0.96 following exercise.  Normal segmental pressures and PVRs.       Periph Intervention 10/2017: Successful right iliac stent.     Abd Ao Duplex 9/2017: Elevated velocities right MICHAEL with monophasic flow.     Echo 9/2017: LVEF 65%, Top normal LV size with overall preserved function. No significant pulmonary hypertension by Doppler. Technically difficult study.     Cath 8/13/17: LAD tortuous, no significant disease. RCA dominant and patent. Left circumflex with 90% proximal lesion, 95% mid lesion and distal thrombus.  Successful NERI ×2 left circumflex. Occluded right MICHAEL at origin with collaterals. Patent left MICHAEL and CFA    EKG  Assessment/Plan     1. Essential hypertension  Mirela has a well-controlled blood pressure and should remain on lisinopril 10 mg daily with Toprol- mg daily.     2. Peripheral vascular disease (CMS/HCC)  Mirela denies claudication and her examination is stable.  Continue aspirin and statin therapy.  She has achieved an LDL goal less than 70 mg/dL.  She has follow-up with Dr. Ugalde arranged in the next few months.     3. CAD in native artery  No new anginal symptoms and EKG is unchanged.  Continue aspirin, atorvastatin, lisinopril and beta-blocker.  EKG is unchanged.     4. Abnormal EKG  EKG is abnormal but unchanged.  No additional recommendations at this time.     5.  Diabetes mellitus  Patient's lab work was reviewed after the visit and her last hemoglobin A1c was 9.  She is on metformin 1500 mg daily.  She states that she also had glipizide 5 mg daily added.  I recommended the following to the patient: lose weight, increase physical activity with a goal of 150 minutes of moderate intensity or 75 minutes vigorous intensity aerobic exercise per week, Mediterranean diet incorporating vegetables, fruits, whole grains, plant-based protein, lean animal protein and fish, decrease sugar intake including sweetened drinks and refined carbohydrates and consider addition of SGLT2 inhibitor given her cardiovascular history.  We discussed the cardiovascular benefits of these medications and she anticipates having follow-up blood work shortly.    Return in about 6 months (around 8/22/2021) for follow-up, earlier if any change in symptoms, and after studies have been completed.  Available medical records reviewed and updated including laboratory data, imaging studies, previous outpatient and inpatient records.  Counseling/education performed. Care everywhere utilized where appropriate.    Thank you for allowing  me to participate in the care of this patient.  I hope this information is helpful.  Social distancing, masking, handwashing and careful monitoring for any COVID symptoms discussed with patient.      Zara Mccloud MD [facility], [facility]  2/22/2021  5:36 PM  This document was generated utilizing voice recognition technology. Please excuse any typographical errors which may be present.

## 2021-02-22 NOTE — PROGRESS NOTES
I called and left a  for patient, asking her to return my phone call to discuss paperwork she left at the  for us to complete.

## 2021-02-26 ENCOUNTER — TELEPHONE (OUTPATIENT)
Dept: SCHEDULING | Facility: CLINIC | Age: 56
End: 2021-02-26

## 2021-02-26 NOTE — TELEPHONE ENCOUNTER
Mirela is returning Bisi's call, she states  she can  the paperwork on Monday.     Please confirm with her that this is ok

## 2021-03-10 ENCOUNTER — APPOINTMENT (OUTPATIENT)
Dept: CARDIOLOGY | Facility: CLINIC | Age: 56
End: 2021-03-10
Attending: INTERNAL MEDICINE
Payer: COMMERCIAL

## 2021-03-10 ENCOUNTER — APPOINTMENT (OUTPATIENT)
Dept: CARDIOLOGY | Facility: CLINIC | Age: 56
Setting detail: NUCLEAR MEDICINE
End: 2021-03-10
Attending: INTERNAL MEDICINE
Payer: COMMERCIAL

## 2021-03-10 ENCOUNTER — HOSPITAL ENCOUNTER (OUTPATIENT)
Dept: CARDIOLOGY | Facility: CLINIC | Age: 56
Setting detail: NUCLEAR MEDICINE
Discharge: HOME | End: 2021-03-10
Attending: INTERNAL MEDICINE
Payer: COMMERCIAL

## 2021-03-10 VITALS
BODY MASS INDEX: 41.48 KG/M2 | HEIGHT: 64 IN | SYSTOLIC BLOOD PRESSURE: 120 MMHG | DIASTOLIC BLOOD PRESSURE: 66 MMHG | HEART RATE: 90 BPM | WEIGHT: 243 LBS

## 2021-03-10 DIAGNOSIS — I25.10 CORONARY ARTERY DISEASE INVOLVING NATIVE CORONARY ARTERY OF NATIVE HEART WITHOUT ANGINA PECTORIS: ICD-10-CM

## 2021-03-10 DIAGNOSIS — R06.09 DYSPNEA ON EXERTION: ICD-10-CM

## 2021-03-10 LAB
AORTIC ROOT ANNULUS - M-MODE: 3.1 CM
AORTIC ROOT ANNULUS: 3.4 CM
ASCENDING AORTA: 2.7 CM
AV PEAK GRADIENT: 8 MMHG
AV PEAK VELOCITY-S: 1.44 M/S
AV VALVE AREA: 2.43 CM2
BSA FOR ECHO PROCEDURE: 2.23 M2
CV NM TETROFOSMIN REST DOSE: 9.9 MCI
CV NM TETROFOSMIN STRESS DOSE: 30.9 MCI
DOP CALC LVOT STROKE VOLUME: 55.83 ML
E WAVE DECELERATION TIME: 215 MS
E/A RATIO: 0.7
E/E' RATIO: 8.9
E/LAT E' RATIO: 11.9
FRACTIONAL SHORTENING: 24.9 %
INTERVENTRICULAR SEPTUM: 1.07 CM
LA ESV (BP): 23.8 CM3
LA ESV INDEX (A2C): 8.34 CM3/M2
LA ESV INDEX (BP): 10.67 CM3/M2
LA/AORTA RATIO: 1.06
LAAS-AP2: 11.1 CM2
LAAS-AP4: 13.8 CM2
LAD 2D - M-MODE: 3.3 CM
LAD 2D - M-MODE: 3.3 CM
LAD 2D: 3.6 CM
LALD A4C: 5.32 CM
LALD A4C: 5.37 CM
LAV-S: 18.6 CM3
LEFT ATRIUM VOLUME INDEX: 13.68 CM3/M2
LEFT ATRIUM VOLUME: 30.5 CM3
LEFT INTERNAL DIMENSION IN SYSTOLE: 3.65 CM (ref 3.34–5.06)
LEFT VENTRICULAR INTERNAL DIMENSION IN DIASTOLE: 4.86 CM (ref 5.72–7.95)
LEFT VENTRICULAR POSTERIOR WALL IN END DIASTOLE: 1.12 CM (ref 0.72–1.34)
LVOT 2D: 1.9 CM
LVOT A: 2.84 CM2
LVOT MG: 3 MMHG
LVOT MV: 0.78 M/S
LVOT PEAK VELOCITY: 1.23 M/S
LVOT PG: 6 MMHG
LVOT VTI: 19.7 CM
MV E'TISSUE VEL-LAT: 0.07 M/S
MV E'TISSUE VEL-MED: 0.09 M/S
MV PEAK A VEL: 1.05 M/S
MV PEAK E VEL: 0.79 M/S
POSTERIOR WALL: 1.12 CM
PV PEAK GRADIENT: 6 MMHG
PV PV: 1.22 M/S
RVOT VMAX: 0.84 M/S
RVOT VTI: 17.8 CM
SEPTAL TISSUE DOPPLER FREE WALL LATE DIA VELOCITY (APICAL 4 CHAMBER VIEW): 0.1 M/S
STRESS ANGINA INDEX: 0
STRESS BASELINE BP: NORMAL MMHG
STRESS BASELINE HR: 82 BPM
STRESS ECHO POST RECOVERY HR: 126 BPM
STRESS PERCENT HR: 92 %
STRESS POST ESTIMATED WORKLOAD: 4.6 METS
STRESS POST EXERCISE DUR MIN: 3 MIN
STRESS POST PEAK BP: NORMAL MMHG
STRESS POST PEAK HR: 153 BPM
STRESS TARGET HR: 142 BPM
Z-SCORE OF LEFT VENTRICULAR DIMENSION IN END DIASTOLE: -3.27
Z-SCORE OF LEFT VENTRICULAR DIMENSION IN END SYSTOLE: -0.95
Z-SCORE OF LEFT VENTRICULAR POSTERIOR WALL IN END DIASTOLE: 0.68

## 2021-03-10 PROCEDURE — 200200 CV NUCLEAR CARDIOLOGY MPI STRESS EXERCISE: Performed by: INTERNAL MEDICINE

## 2021-03-10 PROCEDURE — 93015 CV STRESS TEST SUPVJ I&R: CPT | Performed by: INTERNAL MEDICINE

## 2021-03-10 PROCEDURE — 93306 TTE W/DOPPLER COMPLETE: CPT | Performed by: INTERNAL MEDICINE

## 2021-03-10 PROCEDURE — A9502 TC99M TETROFOSMIN: HCPCS | Performed by: INTERNAL MEDICINE

## 2021-03-10 PROCEDURE — 78452 HT MUSCLE IMAGE SPECT MULT: CPT | Mod: MG | Performed by: INTERNAL MEDICINE

## 2021-03-10 PROCEDURE — G1004 CDSM NDSC: HCPCS | Performed by: INTERNAL MEDICINE

## 2021-03-12 NOTE — RESULT ENCOUNTER NOTE
Called and left voicemail for patient to follow-up regarding stress test results.  Told her she can contact me at her convenience via the portal or telephone.

## 2021-03-15 ENCOUNTER — TELEPHONE (OUTPATIENT)
Dept: CARDIOLOGY | Facility: CLINIC | Age: 56
End: 2021-03-15

## 2021-03-15 ENCOUNTER — TELEPHONE (OUTPATIENT)
Dept: SCHEDULING | Facility: CLINIC | Age: 56
End: 2021-03-15

## 2021-03-15 DIAGNOSIS — I25.10 CORONARY ARTERY DISEASE INVOLVING NATIVE CORONARY ARTERY OF NATIVE HEART WITHOUT ANGINA PECTORIS: ICD-10-CM

## 2021-03-15 DIAGNOSIS — R94.39 ABNORMAL STRESS TEST: Primary | ICD-10-CM

## 2021-03-15 NOTE — TELEPHONE ENCOUNTER
I spoke with Mirela and I personally reviewed her nuclear stress test.  She has ischemia in the territory of the circumflex distribution, which is concerning because this is where prior stenting was performed.  We reviewed these findings and she is agreeable to proceed with cardiac catheterization.  I do not think that coronary CTA would be particularly helpful with her body habitus and stent artifact.  [facility]  She says she has gotten her first Covid vaccine shot.  I will place orders now and she will be contacted by catheterization scheduling.

## 2021-03-16 ENCOUNTER — TELEPHONE (OUTPATIENT)
Dept: CARDIOLOGY | Facility: HOSPITAL | Age: 56
End: 2021-03-16

## 2021-03-16 DIAGNOSIS — Z01.812 PRE-PROCEDURE LAB EXAM: Primary | ICD-10-CM

## 2021-03-16 PROBLEM — R94.39 ABNORMAL STRESS TEST: Status: ACTIVE | Noted: 2021-03-16

## 2021-03-16 PROBLEM — I25.10 CORONARY ARTERY DISEASE INVOLVING NATIVE CORONARY ARTERY OF NATIVE HEART WITHOUT ANGINA PECTORIS: Status: ACTIVE | Noted: 2021-03-16

## 2021-03-16 NOTE — TELEPHONE ENCOUNTER
Called and spoke with patient and scheduled her LHC with Dr. Cerrato on Tuesday, 3/30/21.  Patricia, can you please put in an order for the COVID-19 Test?  Thanks much!

## 2021-03-25 ENCOUNTER — APPOINTMENT (OUTPATIENT)
Dept: LAB | Facility: HOSPITAL | Age: 56
End: 2021-03-25
Attending: INTERNAL MEDICINE
Payer: COMMERCIAL

## 2021-03-25 DIAGNOSIS — I25.10 CORONARY ARTERY DISEASE INVOLVING NATIVE CORONARY ARTERY OF NATIVE HEART WITHOUT ANGINA PECTORIS: ICD-10-CM

## 2021-03-25 DIAGNOSIS — R94.39 ABNORMAL STRESS TEST: ICD-10-CM

## 2021-03-25 DIAGNOSIS — Z01.812 PRE-PROCEDURE LAB EXAM: ICD-10-CM

## 2021-03-25 LAB
ALBUMIN SERPL-MCNC: 4.3 G/DL (ref 3.4–5)
ALP SERPL-CCNC: 97 IU/L (ref 35–126)
ALT SERPL-CCNC: 23 IU/L (ref 11–54)
ANION GAP SERPL CALC-SCNC: 11 MEQ/L (ref 3–15)
AST SERPL-CCNC: 17 IU/L (ref 15–41)
BILIRUB SERPL-MCNC: 0.7 MG/DL (ref 0.3–1.2)
BUN SERPL-MCNC: 12 MG/DL (ref 8–20)
CALCIUM SERPL-MCNC: 9.6 MG/DL (ref 8.9–10.3)
CHLORIDE SERPL-SCNC: 103 MEQ/L (ref 98–109)
CO2 SERPL-SCNC: 25 MEQ/L (ref 22–32)
CREAT SERPL-MCNC: 0.6 MG/DL (ref 0.6–1.1)
GFR SERPL CREATININE-BSD FRML MDRD: >60 ML/MIN/1.73M*2
GLUCOSE SERPL-MCNC: 188 MG/DL (ref 70–99)
INR PPP: 1
POTASSIUM SERPL-SCNC: 5 MEQ/L (ref 3.6–5.1)
PROT SERPL-MCNC: 6.8 G/DL (ref 6–8.2)
PROTHROMBIN TIME: 13 SEC (ref 12.2–14.5)
SARS-COV-2 RNA RESP QL NAA+PROBE: NEGATIVE
SODIUM SERPL-SCNC: 139 MEQ/L (ref 136–144)

## 2021-03-25 PROCEDURE — 36415 COLL VENOUS BLD VENIPUNCTURE: CPT

## 2021-03-25 PROCEDURE — U0003 INFECTIOUS AGENT DETECTION BY NUCLEIC ACID (DNA OR RNA); SEVERE ACUTE RESPIRATORY SYNDROME CORONAVIRUS 2 (SARS-COV-2) (CORONAVIRUS DISEASE [COVID-19]), AMPLIFIED PROBE TECHNIQUE, MAKING USE OF HIGH THROUGHPUT TECHNOLOGIES AS DESCRIBED BY CMS-2020-01-R: HCPCS

## 2021-03-25 PROCEDURE — 80053 COMPREHEN METABOLIC PANEL: CPT

## 2021-03-25 PROCEDURE — 85610 PROTHROMBIN TIME: CPT

## 2021-03-30 ENCOUNTER — HOSPITAL ENCOUNTER (OUTPATIENT)
Facility: HOSPITAL | Age: 56
Setting detail: HOSPITAL OUTPATIENT SURGERY
Discharge: HOME | End: 2021-03-30
Attending: INTERNAL MEDICINE | Admitting: INTERNAL MEDICINE
Payer: COMMERCIAL

## 2021-03-30 VITALS
OXYGEN SATURATION: 97 % | HEIGHT: 64 IN | WEIGHT: 240 LBS | HEART RATE: 82 BPM | BODY MASS INDEX: 40.97 KG/M2 | RESPIRATION RATE: 16 BRPM | SYSTOLIC BLOOD PRESSURE: 143 MMHG | DIASTOLIC BLOOD PRESSURE: 63 MMHG | TEMPERATURE: 98.1 F

## 2021-03-30 DIAGNOSIS — I25.10 CORONARY ARTERY DISEASE INVOLVING NATIVE CORONARY ARTERY OF NATIVE HEART WITHOUT ANGINA PECTORIS: ICD-10-CM

## 2021-03-30 DIAGNOSIS — R94.39 ABNORMAL STRESS TEST: ICD-10-CM

## 2021-03-30 LAB
ATRIAL RATE: 66
ERYTHROCYTE [DISTWIDTH] IN BLOOD BY AUTOMATED COUNT: 13.7 % (ref 11.7–14.4)
GLUCOSE BLD-MCNC: 205 MG/DL (ref 70–99)
HCT VFR BLDCO AUTO: 35.8 % (ref 35–45)
HGB BLD-MCNC: 11.9 G/DL (ref 11.8–15.7)
MCH RBC QN AUTO: 29.4 PG (ref 28–33.2)
MCHC RBC AUTO-ENTMCNC: 33.2 G/DL (ref 32.2–35.5)
MCV RBC AUTO: 88.4 FL (ref 83–98)
P AXIS: 33
PDW BLD AUTO: 8.8 FL (ref 9.4–12.3)
PLATELET # BLD AUTO: 158 K/UL (ref 150–369)
POCT ACT-LR: >400 SEC (ref 113–149)
POCT TEST: ABNORMAL
POCT TEST: ABNORMAL
PR INTERVAL: 154
QRS DURATION: 76
QT INTERVAL: 422
QTC CALCULATION(BAZETT): 442
R AXIS: 11
RBC # BLD AUTO: 4.05 M/UL (ref 3.93–5.22)
T WAVE AXIS: 43
VENTRICULAR RATE: 66
WBC # BLD AUTO: 7.56 K/UL (ref 3.8–10.5)

## 2021-03-30 PROCEDURE — 63700000 HC SELF-ADMINISTRABLE DRUG: Performed by: INTERNAL MEDICINE

## 2021-03-30 PROCEDURE — 93005 ELECTROCARDIOGRAM TRACING: CPT | Performed by: INTERNAL MEDICINE

## 2021-03-30 PROCEDURE — B240ZZ3 ULTRASONOGRAPHY OF SINGLE CORONARY ARTERY, INTRAVASCULAR: ICD-10-PCS | Performed by: INTERNAL MEDICINE

## 2021-03-30 PROCEDURE — 27200000 HC STERILE SUPPLY: Performed by: INTERNAL MEDICINE

## 2021-03-30 PROCEDURE — C1874 STENT, COATED/COV W/DEL SYS: HCPCS | Performed by: INTERNAL MEDICINE

## 2021-03-30 PROCEDURE — B2111ZZ FLUOROSCOPY OF MULTIPLE CORONARY ARTERIES USING LOW OSMOLAR CONTRAST: ICD-10-PCS | Performed by: INTERNAL MEDICINE

## 2021-03-30 PROCEDURE — 92928 PRQ TCAT PLMT NTRAC ST 1 LES: CPT | Mod: LD | Performed by: INTERNAL MEDICINE

## 2021-03-30 PROCEDURE — C1887 CATHETER, GUIDING: HCPCS | Performed by: INTERNAL MEDICINE

## 2021-03-30 PROCEDURE — 93010 ELECTROCARDIOGRAM REPORT: CPT | Performed by: INTERNAL MEDICINE

## 2021-03-30 PROCEDURE — 63600000 HC DRUGS/DETAIL CODE: Performed by: NURSE PRACTITIONER

## 2021-03-30 PROCEDURE — 71000001 HC PACU PHASE 1 INITIAL 30MIN: Performed by: INTERNAL MEDICINE

## 2021-03-30 PROCEDURE — 93458 L HRT ARTERY/VENTRICLE ANGIO: CPT | Mod: 26,XU | Performed by: INTERNAL MEDICINE

## 2021-03-30 PROCEDURE — C1894 INTRO/SHEATH, NON-LASER: HCPCS | Performed by: INTERNAL MEDICINE

## 2021-03-30 PROCEDURE — 92978 ENDOLUMINL IVUS OCT C 1ST: CPT | Mod: LD | Performed by: INTERNAL MEDICINE

## 2021-03-30 PROCEDURE — 63600000 HC DRUGS/DETAIL CODE: Performed by: INTERNAL MEDICINE

## 2021-03-30 PROCEDURE — 85027 COMPLETE CBC AUTOMATED: CPT | Performed by: INTERNAL MEDICINE

## 2021-03-30 PROCEDURE — C1753 CATH, INTRAVAS ULTRASOUND: HCPCS | Performed by: INTERNAL MEDICINE

## 2021-03-30 PROCEDURE — 63600105 HC IODINE BASED CONTRAST: Performed by: INTERNAL MEDICINE

## 2021-03-30 PROCEDURE — 71000011 HC PACU PHASE 1 EA ADDL MIN: Performed by: INTERNAL MEDICINE

## 2021-03-30 PROCEDURE — 85347 COAGULATION TIME ACTIVATED: CPT | Performed by: INTERNAL MEDICINE

## 2021-03-30 PROCEDURE — 93458 L HRT ARTERY/VENTRICLE ANGIO: CPT | Mod: XU | Performed by: INTERNAL MEDICINE

## 2021-03-30 PROCEDURE — 25000000 HC PHARMACY GENERAL: Performed by: INTERNAL MEDICINE

## 2021-03-30 PROCEDURE — 36415 COLL VENOUS BLD VENIPUNCTURE: CPT | Performed by: INTERNAL MEDICINE

## 2021-03-30 PROCEDURE — 027035Z DILATION OF CORONARY ARTERY, ONE ARTERY WITH TWO DRUG-ELUTING INTRALUMINAL DEVICES, PERCUTANEOUS APPROACH: ICD-10-PCS | Performed by: INTERNAL MEDICINE

## 2021-03-30 PROCEDURE — 99153 MOD SED SAME PHYS/QHP EA: CPT | Performed by: INTERNAL MEDICINE

## 2021-03-30 PROCEDURE — 92978 ENDOLUMINL IVUS OCT C 1ST: CPT | Mod: 26,LD | Performed by: INTERNAL MEDICINE

## 2021-03-30 PROCEDURE — C9600 PERC DRUG-EL COR STENT SING: HCPCS | Mod: LD | Performed by: INTERNAL MEDICINE

## 2021-03-30 PROCEDURE — 4A023N7 MEASUREMENT OF CARDIAC SAMPLING AND PRESSURE, LEFT HEART, PERCUTANEOUS APPROACH: ICD-10-PCS | Performed by: INTERNAL MEDICINE

## 2021-03-30 PROCEDURE — 99152 MOD SED SAME PHYS/QHP 5/>YRS: CPT | Performed by: INTERNAL MEDICINE

## 2021-03-30 PROCEDURE — C1769 GUIDE WIRE: HCPCS | Performed by: INTERNAL MEDICINE

## 2021-03-30 PROCEDURE — C1725 CATH, TRANSLUMIN NON-LASER: HCPCS | Performed by: INTERNAL MEDICINE

## 2021-03-30 DEVICE — STENT  RESOLUTE ONYX 2.00 X 08MM: Type: IMPLANTABLE DEVICE | Site: HEART | Status: FUNCTIONAL

## 2021-03-30 DEVICE — STENT RONYX20030UX RESOLUTE ONYX 2.00X30
Type: IMPLANTABLE DEVICE | Site: CORONARY | Status: FUNCTIONAL
Brand: RESOLUTE ONYX™

## 2021-03-30 RX ORDER — HEPARIN SODIUM 1000 [USP'U]/ML
INJECTION, SOLUTION INTRAVENOUS; SUBCUTANEOUS AS NEEDED
Status: DISCONTINUED | OUTPATIENT
Start: 2021-03-30 | End: 2021-03-30 | Stop reason: HOSPADM

## 2021-03-30 RX ORDER — IODIXANOL 320 MG/ML
INJECTION, SOLUTION INTRAVASCULAR AS NEEDED
Status: DISCONTINUED | OUTPATIENT
Start: 2021-03-30 | End: 2021-03-30 | Stop reason: HOSPADM

## 2021-03-30 RX ORDER — FENTANYL CITRATE 50 UG/ML
25 INJECTION, SOLUTION INTRAMUSCULAR; INTRAVENOUS ONCE
Status: COMPLETED | OUTPATIENT
Start: 2021-03-30 | End: 2021-03-30

## 2021-03-30 RX ORDER — ASPIRIN 325 MG
325 TABLET ORAL ONCE
Status: COMPLETED | OUTPATIENT
Start: 2021-03-30 | End: 2021-03-30

## 2021-03-30 RX ORDER — FENTANYL CITRATE 50 UG/ML
INJECTION, SOLUTION INTRAMUSCULAR; INTRAVENOUS AS NEEDED
Status: DISCONTINUED | OUTPATIENT
Start: 2021-03-30 | End: 2021-03-30 | Stop reason: HOSPADM

## 2021-03-30 RX ORDER — MIDAZOLAM HYDROCHLORIDE 2 MG/2ML
INJECTION, SOLUTION INTRAMUSCULAR; INTRAVENOUS AS NEEDED
Status: DISCONTINUED | OUTPATIENT
Start: 2021-03-30 | End: 2021-03-30 | Stop reason: HOSPADM

## 2021-03-30 RX ORDER — LIDOCAINE HYDROCHLORIDE 10 MG/ML
INJECTION, SOLUTION INFILTRATION; PERINEURAL AS NEEDED
Status: DISCONTINUED | OUTPATIENT
Start: 2021-03-30 | End: 2021-03-30 | Stop reason: HOSPADM

## 2021-03-30 RX ADMIN — FENTANYL CITRATE 25 MCG: 50 INJECTION INTRAMUSCULAR; INTRAVENOUS at 09:31

## 2021-03-30 RX ADMIN — ASPIRIN 325 MG: 325 TABLET, FILM COATED ORAL at 07:33

## 2021-03-30 NOTE — PROGRESS NOTES
S/p cath with steting to LAD  Mild chest discomfort post procedure  No nausea or sob  R radial site intact with TR band  EKG without acute changes  Dr Cerrato at bedside  Will give Fentanyl dose 25 mg x1  Will monitor closely

## 2021-03-30 NOTE — Clinical Note
Stent deployed in the mid left anterior descending. Pressure = 18 juni. Inflation time =  5 seconds.

## 2021-03-30 NOTE — Clinical Note
Closure device placed for the right radial artery. Closure device used: radial compression system. Hemostasis achieved.

## 2021-03-30 NOTE — POST-PROCEDURE NOTE
Same Day Discharge    EKG completed and reviewed by physician 4 hrs post procedure. Patient completed and tolerated 100 meter ambulation around unit.     Discharge instructions given with the emphasis on importance of follow-up visit with physician, necessity of taking antiplatelet medications, evaluation of access site until healed and recognition of signs and symptoms of ACS.       Antiplatelet Regimen: ticagrelor 90 mg  EKG shown to Dr. Carrasco, rx called to 's  pharmacy

## 2021-03-30 NOTE — H&P
Cardiology History and Physical     Admitting Diagnosis   Abnormal stress test [R94.39]  Coronary artery disease involving native coronary artery of native heart without angina pectoris [I25.10]   HPI    Mirela Lopez is a 55 y.o. female with PMH of htn, chol, CAD with NSTEMI in 2017 who presents to [facility] for cardiac catheterization. Pt with hx of NSTEMI in 2017: found to have a 95% mid and distal circumflex with thrombus as well as a 90% proximal circumflex stenosis.  She underwent successful NERI of all these lesions and had no other obstructive disease. Pt has stress test:  Abnormal exercise nuclear stress test.  Reversible defects are noted in the lateral wall, apex, and anterior wall suggestive of ischemia.  No prior study for comparison.  Left ventricular ejection fraction 67%.  No transient ischemic dilatation. 2. EKG portion is negative for arrhythmias or ischemic changes.  Patient exercised on Abner protocol for 3 minutes 4.6 METS.  Poor exercise tolerance.  No chest pain reported.  Peak blood pressure 144/88 mmHg.  Patient achieved 92% of predicted maximal heart rate.  For cath today.       Medical History   Medical History:   Past Medical History:   Diagnosis Date   • Coronary artery disease    • GERD (gastroesophageal reflux disease)    • Hyperlipidemia    • Hypertension    • Impaired fasting glucose    • Myocardial infarction (CMS/[facility]) 2017    NSTEMI with stent left circumflex   • Obesity    • Peripheral vascular disease (CMS/[facility]) 2017    Right iliac claudication.  Aortogram demonstrated near occlusion of the right common iliac.  Successful right iliac stent 2017       Surgical History:   Past Surgical History:   Procedure Laterality Date   • CARDIAC CATHETERIZATION      LAD tortuous, no significant disease.  Dominant RCA-patent.  90% proximal cx, 95% mid cx and distal thrombus   • CARPAL TUNNEL RELEASE     •  SECTION     • CORONARY STENT PLACEMENT  2017    NERI ×2 left  circumflex-performed in [place]   • ILIAC ARTERY STENT  10/2017    Dr. Uaglde       Allergies: Adhesive and Adhesive tape-silicones    No current facility-administered medications for this encounter.        Social History:   Social History     Socioeconomic History   • Marital status:      Spouse name: Not on file   • Number of children: Not on file   • Years of education: Not on file   • Highest education level: Not on file   Occupational History   • Not on file   Social Needs   • Financial resource strain: Not on file   • Food insecurity     Worry: Not on file     Inability: Not on file   • Transportation needs     Medical: Not on file     Non-medical: Not on file   Tobacco Use   • Smoking status: Former Smoker     Quit date: 8/12/2017     Years since quitting: 3.6   • Smokeless tobacco: Never Used   Substance and Sexual Activity   • Alcohol use: Yes     Comment: Occasional social alcohol   • Drug use: No   • Sexual activity: Defer   Lifestyle   • Physical activity     Days per week: Not on file     Minutes per session: Not on file   • Stress: Not on file   Relationships   • Social connections     Talks on phone: Not on file     Gets together: Not on file     Attends [employer] service: Not on file     Active member of club or organization: Not on file     Attends meetings of clubs or organizations: Not on file     Relationship status: Not on file   • Intimate partner violence     Fear of current or ex partner: Not on file     Emotionally abused: Not on file     Physically abused: Not on file     Forced sexual activity: Not on file   Other Topics Concern   • Not on file   Social History Narrative   • Not on file       Family History:   Family History   Problem Relation Age of Onset   • Lung cancer Biological Mother    • Heart disease Biological Father    • Hyperlipidemia Biological Father    • Diabetes type II Maternal Grandmother          Review of Systems   All other systems reviewed and negative  "except as noted in the HPI.   Objective    Vital Signs for the last 24 hours   Temp:  [36.7 °C (98.1 °F)] 36.7 °C (98.1 °F)  Heart Rate:  [72-74] 72  Resp:  [16] 16  BP: (141)/(69) 141/69       Physical Exam   Visit Vitals  BP (!) 141/69   Pulse 72   Temp 36.7 °C (98.1 °F) (Tympanic)   Resp 16   Ht 1.626 m (5' 4\")   Wt 109 kg (240 lb)   SpO2 97%   BMI 41.20 kg/m²     General appearance: alert, appears stated age and cooperative  Head: normocephalic, without obvious abnormality, atraumatic  Lungs: clear to auscultation bilaterally  Heart: regular rate and rhythm, S1, S2 normal, no murmur, click, rub or gallop  Abdomen: soft, non-tender; bowel sounds normal; no masses, no organomegaly  Extremities: extremities normal, warm and well-perfused; no cyanosis, clubbing, or edema  Pulses: +1 DPs   Skin: Skin color, texture, turgor normal. No rashes or lesions  Neurologic: Grossly normal      Labs   Lab Results   Component Value Date    WBC 7.56 03/30/2021    HGB 11.9 03/30/2021    HCT 35.8 03/30/2021     03/30/2021    ALT 23 03/25/2021    AST 17 03/25/2021     03/25/2021    K 5.0 03/25/2021     03/25/2021    CREATININE 0.6 03/25/2021    BUN 12 03/25/2021    CO2 25 03/25/2021    INR 1.0 03/25/2021     Troponin I Results     No lab values to display.            Imaging     Stress test 3/10/21 : Interpretation Summary    1.  Abnormal exercise nuclear stress test.  Reversible defects are noted in the lateral wall, apex, and anterior wall suggestive of ischemia.  No prior study for comparison.  Left ventricular ejection fraction 67%.  No transient ischemic dilatation.  2.  EKG portion is negative for arrhythmias or ischemic changes.  Patient exercised on Abner protocol for 3 minutes 4.6 METS.  Poor exercise tolerance.  No chest pain reported.  Peak blood pressure 144/88 mmHg.  Patient achieved 92% of predicted maximal heart rate.             Cardiac Imaging   TRANSTHORACIC ECHO (TTE) COMPLETE 03/10/2021    " Narrative · Technically difficult study secondary to body habitus.  · Overall normal LV size with mild concentric LVH.  LVEF 65-70%.  No   obvious regional wall motion abnormalities but endocardium difficult to   visualize.  Impaired relaxation.  · The right ventricle is difficult to visualize in subcostal views are of   poor quality.  The RV is grossly normal in size and function in the apical   views.  · Trileaflet aortic valve.  No aortic stenosis.  · Sclerotic mitral valve leaflets with trace MR.  · No significant pericardial effusion.     Cardiac Imaging   US ARTERIAL LEG BILATERAL 07/22/2020    Narrative Bilateral lower extremity duplex evaluation: Comparison 7/17/2019.    History of bilateral iliac angioplasty and stent.    Right lower extremity: Triphasic Doppler waveforms throughout.  With at   least two-vessel runoff.  Unchanged from previous study.    Left lower extremity: Triphasic waveforms common femoral superficial   femoral popliteal and runoff vessels.  Probable stenosis origin of   profunda femoris.  No significant change from prior study.        ECG     SR    Telemetry   SR       Assessment/Plan      Abnormal stress test  Pt with hx of CAD. In 2017, s/pt non-ST segment elevation MI which occurred on vacation in [place] in August 2017.  She was admitted to the hospital and found to have a 95% mid and distal circumflex with thrombus as well as a 90% proximal circumflex stenosis.  She underwent successful NERI of all these lesions and had no other obstructive disease.  Pt denies cp or sob  Pt had a stress test : Abnormal exercise nuclear stress test.  Reversible defects are noted in the lateral wall, apex, and anterior wall suggestive of ischemia.  No prior study for comparison.  Left ventricular ejection fraction 67%.  No transient ischemic dilatation.  2.  EKG portion is negative for arrhythmias or ischemic changes.  Patient exercised on Abner protocol for 3 minutes 4.6 METS.  Poor exercise  tolerance.  No chest pain reported.  Peak blood pressure 144/88 mmHg.  Patient achieved 92% of predicted maximal heart rate.  For cath today  Plan pending cath results      Essential hypertension  C/w metoprolol and lisinopril     Type 2 diabetes mellitus without complication, without long-term current use of insulin (CMS/[facility])  On glipizide and metformin  Hold Metformin for 48 hours post cath     Elevated lipids  C/w lipitor 80 mg daily                     FAVIOLA Holt  3/30/2021  7:26 AM

## 2021-03-30 NOTE — PRE-PROCEDURE NOTE
Cardiac Cath Lab Pre-procedure Note    - Patient was seen and examined at bedside.  - The patient's chart and all data was reviewed.  - The procedure, treatment alternatives, risks and benefits were explained with specific risks discussed.  - Patient was consented for cardiac cath procedure and possible PCI.    Patient's clinical presentation to the cardiac cath lab: stable ischemic symptoms.     Patient is at risk for stroke due to extensive atherosclerotic vascular disease; acute myocardial infarction; vascular complications due to the presence of severe peripheral arterial disease in the iliac/femoral vessels increasing the risk of hematoma, retroperitoneal bleeding, pseudo-aneurysms and arteriovenous fistula formation; bleeding; renal failure potentially requiring dialysis due to the presence of pre-procedural abnormal renal function and emergency cardiac surgery.   Patient appears to be well.                 Pre-sedation assessment  ASA 3   Mallampati class: III - soft palate, base of uvula visible.

## 2021-03-30 NOTE — ASSESSMENT & PLAN NOTE
Pt with hx of CAD. In 2017, s/pt non-ST segment elevation MI which occurred on vacation in [place] in August 2017.  She was admitted to the hospital and found to have a 95% mid and distal circumflex with thrombus as well as a 90% proximal circumflex stenosis.  She underwent successful NERI of all these lesions and had no other obstructive disease.  Pt denies cp or sob  Pt had a stress test : Abnormal exercise nuclear stress test.  Reversible defects are noted in the lateral wall, apex, and anterior wall suggestive of ischemia.  No prior study for comparison.  Left ventricular ejection fraction 67%.  No transient ischemic dilatation.  2.  EKG portion is negative for arrhythmias or ischemic changes.  Patient exercised on Abner protocol for 3 minutes 4.6 METS.  Poor exercise tolerance.  No chest pain reported.  Peak blood pressure 144/88 mmHg.  Patient achieved 92% of predicted maximal heart rate.  For cath today  Plan pending cath results

## 2021-03-30 NOTE — Clinical Note
Patient placed on procedure table in with right arm extended. Positioning devices: arm board under arms.

## 2021-03-30 NOTE — DISCHARGE INSTRUCTIONS
· Post cardiac catheterization instructions:  · NO driving or operating heavy machinery for 24 hours.  This is because of sedation you received for your procedure.  · On day of discharge, limit activities.  · No heavy lifting greater than 10 lbs for the next 2 days after procedure.    · Remove dressing next day. Keep site clean and dry.  · May shower next day of procedure. Do not submerge catherization site in pool or tub baths for 5 days  · Call if  swelling, bleeding, fever or drainage from catheterization site          Medications:  Please take medications as directed. Any questions or concerns, please contact your physician's  Office.      New medication: Brilinta 90 mg twice a day     Do not take METFORMIN for 48 hours. May resume at usual dose on 4/2/21      Follow up: Dr. LELA Mccloud   [phone]  Follow up in 1 week

## 2021-03-30 NOTE — Clinical Note
Stent deployed in the mid left anterior descending. Pressure = 12 juni. Inflation time =  5 seconds.

## 2021-03-30 NOTE — Clinical Note
The right groin was site clipped, marked  and prepped with ChloraPrep. The patient was draped in a sterile fashion after allowing for the recommended dry time.

## 2021-04-08 ENCOUNTER — OFFICE VISIT (OUTPATIENT)
Dept: CARDIOLOGY | Facility: CLINIC | Age: 56
End: 2021-04-08
Payer: COMMERCIAL

## 2021-04-08 VITALS
WEIGHT: 241 LBS | SYSTOLIC BLOOD PRESSURE: 124 MMHG | DIASTOLIC BLOOD PRESSURE: 78 MMHG | BODY MASS INDEX: 41.37 KG/M2 | HEART RATE: 82 BPM

## 2021-04-08 DIAGNOSIS — I25.10 CORONARY ARTERY DISEASE INVOLVING NATIVE CORONARY ARTERY OF NATIVE HEART WITHOUT ANGINA PECTORIS: Primary | ICD-10-CM

## 2021-04-08 PROCEDURE — 3074F SYST BP LT 130 MM HG: CPT | Performed by: NURSE PRACTITIONER

## 2021-04-08 PROCEDURE — 99213 OFFICE O/P EST LOW 20 MIN: CPT | Performed by: NURSE PRACTITIONER

## 2021-04-08 PROCEDURE — 3078F DIAST BP <80 MM HG: CPT | Performed by: NURSE PRACTITIONER

## 2021-04-08 NOTE — PROGRESS NOTES
HPI  Mirela returns to the office today to follow-up on her recent cardiac catheterization and subsequent PCI/NERI.  Mirela last saw Dr. Call in  and was scheduled for a nuclear stress test.    Mirela had her stress test on 3/10/2021 and had poor exercise tolerance - only completing 3 minutes of Abner Protocol.  There were reversible defects noted in the lateral wall, apex and anterior wall suggestive of ischemic.    It was recommended that she have a cardiac catheterization which was done on 2021.  This revealed a discrete 90% mid LAD stenosis followed by a tubular segment of 70% stenosis.  Her left circumflex stents were patent.  She had successful IVUS guided PTCA and NERI to the mid and proximal LAD lesions.  Since her procedure, Mirela reports feeling well.  She was a little tired the following day but has felt well since that time.  She denies chest pain, SOB or palpitations.  There is no orthopnea, PND or edema.  She denies discomfort at her right radial cath site.           Medical History: has a past medical history of Coronary artery disease, GERD (gastroesophageal reflux disease), Hyperlipidemia, Hypertension, Impaired fasting glucose, Myocardial infarction (CMS/[facility]) (2017), Obesity, and Peripheral vascular disease (CMS/[facility]) (2017).    Surgical History: has a past surgical history that includes Carpal tunnel release;  section; Iliac artery stent (10/2017); Cardiac catheterization (); and Coronary stent placement ().    Social History: reports that she quit smoking about 3 years ago. She has never used smokeless tobacco. She reports current alcohol use. She reports that she does not use drugs.    Family History: She indicated that her biological mother is . She indicated that her biological father is alive. She indicated that the status of her maternal grandmother is unknown.           Allergies:Adhesive and Adhesive tape-silicones    Current  Medications:  Current Outpatient Medications:   •  aspirin 81 mg enteric coated tablet, Take 81 mg by mouth daily., Disp: , Rfl:   •  atorvastatin (LIPITOR) 80 mg tablet, Take 1 tablet (80 mg total) by mouth daily., Disp: 30 tablet, Rfl: 5  •  ferrous sulfate 134 mg (27 mg iron) tablet, Take 1 tablet by mouth daily with breakfast.  , Disp: , Rfl: 6  •  glipiZIDE (GLUCOTROL XL) 5 mg 24 hr tablet, Take 5 mg by mouth daily., Disp: , Rfl:   •  lisinopriL (PRINIVIL) 10 mg tablet, Take 1 tablet (10 mg total) by mouth daily., Disp: 30 tablet, Rfl: 5  •  metFORMIN XR (GLUCOPHAGE-XR) 750 mg 24 hr tablet, Take 2 tablets (1,500 mg total) by mouth daily. With evening meal., Disp: , Rfl:   •  metoprolol succinate XL (TOPROL-XL) 100 mg 24 hr tablet, Take 1 tablet (100 mg total) by mouth daily., Disp: 30 tablet, Rfl: 5  •  nitroglycerin (NITROSTAT) 0.4 mg SL tablet, Place 1 tablet (0.4 mg total) under the tongue every 5 (five) minutes as needed for chest pain., Disp: 25 tablet, Rfl: 3  •  ticagrelor (BRILINTA) 90 mg tablet, Take 1 tablet (90 mg total) by mouth 2 (two) times a day., Disp: 60 tablet, Rfl: 3         ROS  Vitals:    04/08/21 1130 04/08/21 1134   BP: 126/78 124/78   BP Location: Right upper arm Left upper arm   Patient Position: Sitting    Pulse: 82    Weight: 109 kg (241 lb)      Physical Exam   Constitutional: She is oriented to person, place, and time. She appears well-developed and well-nourished. No distress.   HENT:   Head: Normocephalic and atraumatic.   Eyes: Pupils are equal, round, and reactive to light. Conjunctivae are normal.   Neck: Normal range of motion. Neck supple. No JVD present.   Cardiovascular: Normal rate and regular rhythm.   Right radial cath site with minimal residual bruising.  No hematoma or bruit.   Pulmonary/Chest: Effort normal. No respiratory distress. She has no wheezes. She has no rales.   Abdominal: Soft.   Musculoskeletal: Normal range of motion.         General: No edema.    Neurological: She is alert and oriented to person, place, and time.   Skin: Skin is warm and dry. No rash noted. She is not diaphoretic. No erythema. No pallor.   Psychiatric: She has a normal mood and affect. Her behavior is normal. Judgment and thought content normal.              ECG:  Not done this visit.    Coronary artery disease involving native coronary artery of native heart without angina pectoris  Mirela is s/p PCI with NERI to the mid and proximal LAD lesions.  Her left circumflex stents were patent.  She is feeling well and her right radial cath site is well healed.  She is currently on aspirin and brilinta 90 mg twice a day was added.  She was on brilinta after her initial intervention and did well.  She was reminded that she must remain on DAPT and cannot miss any doses.  She will continue lisinopril 10 mg and metoprolol  mg daily.  She needs aggressive risk factor modification.  Her last LDL was 38 in August, 2020 and she will continue atorvastatin 80 mg daily.  She has a follow-up appointment scheduled with her PCP for repeat labs and will also bring up starting an SGLT2-inhibitor.           Mirela has a follow-up appointment with Dr. Call scheduled but is aware to contact us in the interim with any problems.    I spent 28 minutes on this date of service performing the following activities: obtaining history, performing examination, documenting, obtaining / reviewing records and providing counseling and education.    FAVIOLA Saunders   4/8/2021  2:02 PM

## 2021-04-08 NOTE — ASSESSMENT & PLAN NOTE
Mirela is s/p PCI with NERI to the mid and proximal LAD lesions.  Her left circumflex stents were patent.  She is feeling well and her right radial cath site is well healed.  She is currently on aspirin and brilinta 90 mg twice a day was added.  She was on brilinta after her initial intervention and did well.  She was reminded that she must remain on DAPT and cannot miss any doses.  She will continue lisinopril 10 mg and metoprolol  mg daily.  She needs aggressive risk factor modification.  Her last LDL was 38 in August, 2020 and she will continue atorvastatin 80 mg daily.  She has a follow-up appointment scheduled with her PCP for repeat labs and will also bring up starting an SGLT2-inhibitor.

## 2021-04-18 DIAGNOSIS — I10 ESSENTIAL HYPERTENSION: ICD-10-CM

## 2021-04-18 DIAGNOSIS — I73.9 PERIPHERAL VASCULAR DISEASE (CMS/HCC): ICD-10-CM

## 2021-04-19 RX ORDER — LISINOPRIL 10 MG/1
TABLET ORAL
Qty: 30 TABLET | Refills: 5 | Status: SHIPPED | OUTPATIENT
Start: 2021-04-19 | End: 2021-08-03 | Stop reason: SDUPTHER

## 2021-04-19 RX ORDER — ATORVASTATIN CALCIUM 80 MG/1
TABLET, FILM COATED ORAL
Qty: 30 TABLET | Refills: 5 | Status: SHIPPED | OUTPATIENT
Start: 2021-04-19 | End: 2021-08-03 | Stop reason: SDUPTHER

## 2021-04-19 RX ORDER — METOPROLOL SUCCINATE 100 MG/1
TABLET, EXTENDED RELEASE ORAL
Qty: 30 TABLET | Refills: 5 | Status: SHIPPED | OUTPATIENT
Start: 2021-04-19 | End: 2021-08-03 | Stop reason: SDUPTHER

## 2021-05-27 ENCOUNTER — OFFICE VISIT (OUTPATIENT)
Dept: PRIMARY CARE | Facility: CLINIC | Age: 56
End: 2021-05-27
Payer: COMMERCIAL

## 2021-05-27 VITALS
DIASTOLIC BLOOD PRESSURE: 80 MMHG | HEART RATE: 81 BPM | HEIGHT: 64 IN | TEMPERATURE: 97.7 F | OXYGEN SATURATION: 97 % | RESPIRATION RATE: 16 BRPM | SYSTOLIC BLOOD PRESSURE: 118 MMHG | WEIGHT: 239 LBS | BODY MASS INDEX: 40.8 KG/M2

## 2021-05-27 DIAGNOSIS — E78.00 PURE HYPERCHOLESTEROLEMIA: ICD-10-CM

## 2021-05-27 DIAGNOSIS — I10 ESSENTIAL HYPERTENSION: Primary | ICD-10-CM

## 2021-05-27 DIAGNOSIS — E11.9 TYPE 2 DIABETES MELLITUS WITHOUT COMPLICATION, WITHOUT LONG-TERM CURRENT USE OF INSULIN (CMS/HCC): ICD-10-CM

## 2021-05-27 DIAGNOSIS — I25.10 CORONARY ARTERY DISEASE INVOLVING NATIVE CORONARY ARTERY OF NATIVE HEART WITHOUT ANGINA PECTORIS: ICD-10-CM

## 2021-05-27 DIAGNOSIS — D50.9 IRON DEFICIENCY ANEMIA, UNSPECIFIED IRON DEFICIENCY ANEMIA TYPE: ICD-10-CM

## 2021-05-27 PROBLEM — I25.2 HISTORY OF ACUTE MYOCARDIAL INFARCTION: Status: ACTIVE | Noted: 2021-05-27

## 2021-05-27 PROCEDURE — 99204 OFFICE O/P NEW MOD 45 MIN: CPT | Performed by: INTERNAL MEDICINE

## 2021-05-27 PROCEDURE — 3079F DIAST BP 80-89 MM HG: CPT | Performed by: INTERNAL MEDICINE

## 2021-05-27 PROCEDURE — 3008F BODY MASS INDEX DOCD: CPT | Performed by: INTERNAL MEDICINE

## 2021-05-27 PROCEDURE — 3074F SYST BP LT 130 MM HG: CPT | Performed by: INTERNAL MEDICINE

## 2021-05-27 ASSESSMENT — PATIENT HEALTH QUESTIONNAIRE - PHQ9: SUM OF ALL RESPONSES TO PHQ9 QUESTIONS 1 & 2: 0

## 2021-05-27 NOTE — PROGRESS NOTES
"  Subjective      Patient ID: Mirela Lopez is a 55 y.o. female.  1965      New pt visit 55 y old former pt of Dr Latosha BRUNO just over a year ago  Pt is under the care of Dr DAMASO Heller she had 2 stents placed 2017 and two months ago she had a stress test that was abnormal with two additional stents.  She has a stent in the right leg.  Under the care of Vascular Dr Ugalde.  Needs aggressive risk factor modification.  She is not checking blood sugars .  Urine microalbumin negative 2020.  Had retinal exam this spring negative for retinopathy Last A1c was 9 in August     Had flu covid and PNA      The following have been reviewed and updated as appropriate in this visit:  Allergies  Meds  Problems  Med Hx  Surg Hx       Review of Systems   All other systems reviewed and are negative.      Objective     Vitals:    05/27/21 1109   BP: 118/80   BP Location: Right upper arm   Patient Position: Sitting   Pulse: 81   Resp: 16   Temp: 36.5 °C (97.7 °F)   SpO2: 97%   Weight: 108 kg (239 lb)   Height: 1.626 m (5' 4\")     Body mass index is 41.02 kg/m².    Physical Exam  Vitals reviewed.   Constitutional:       Appearance: She is well-developed.   HENT:      Head: Normocephalic.   Cardiovascular:      Rate and Rhythm: Normal rate and regular rhythm.      Heart sounds: Normal heart sounds.   Pulmonary:      Effort: Pulmonary effort is normal.      Breath sounds: Normal breath sounds.   Musculoskeletal:      Cervical back: Normal range of motion and neck supple.         Assessment/Plan   Diagnoses and all orders for this visit:    Essential hypertension (Primary)  -     Comprehensive metabolic panel; Future  Controlled cont Metoprolol Prinivil      Coronary artery disease involving native coronary artery of native heart without angina pectoris  On Brelinta Metoprolol Lipitor Asa  Type 2 diabetes mellitus without complication, without long-term current use of insulin (CMS/[facility])  -     Comprehensive metabolic panel; Future  -    "  Hemoglobin A1c; Future  -     empagliflozin (JARDIANCE) 25 mg tablet; Take 1 tablet (25 mg total) by mouth daily.  Cont Metformin will cont Glucotrol for now but would like to see pt on medications that promote weight loss ie Rybelsus     Pure hypercholesterolemia  -     Comprehensive metabolic panel; Future  -     Lipid panel; Future  -     TSH w reflex FT4; Future  Cont Atorvastatin   Iron deficiency anemia, unspecified iron deficiency anemia type  -     Iron and TIBC; Future  -     Ferritin; Future

## 2021-06-03 ENCOUNTER — OFFICE VISIT (OUTPATIENT)
Dept: PRIMARY CARE | Facility: CLINIC | Age: 56
End: 2021-06-03
Payer: COMMERCIAL

## 2021-06-03 VITALS
HEIGHT: 64 IN | OXYGEN SATURATION: 96 % | BODY MASS INDEX: 40.8 KG/M2 | HEART RATE: 103 BPM | TEMPERATURE: 97.2 F | SYSTOLIC BLOOD PRESSURE: 132 MMHG | DIASTOLIC BLOOD PRESSURE: 76 MMHG | WEIGHT: 239 LBS | RESPIRATION RATE: 16 BRPM

## 2021-06-03 DIAGNOSIS — M84.444A: Primary | ICD-10-CM

## 2021-06-03 DIAGNOSIS — S90.31XA TRAUMATIC ECCHYMOSIS OF RIGHT FOOT, INITIAL ENCOUNTER: ICD-10-CM

## 2021-06-03 PROCEDURE — 3075F SYST BP GE 130 - 139MM HG: CPT | Performed by: NURSE PRACTITIONER

## 2021-06-03 PROCEDURE — 3078F DIAST BP <80 MM HG: CPT | Performed by: NURSE PRACTITIONER

## 2021-06-03 PROCEDURE — 99214 OFFICE O/P EST MOD 30 MIN: CPT | Performed by: NURSE PRACTITIONER

## 2021-06-03 PROCEDURE — 3008F BODY MASS INDEX DOCD: CPT | Performed by: NURSE PRACTITIONER

## 2021-06-03 ASSESSMENT — ENCOUNTER SYMPTOMS
CHILLS: 0
SHORTNESS OF BREATH: 0
JOINT SWELLING: 1
DIZZINESS: 0
FATIGUE: 0
WEAKNESS: 0
SPEECH DIFFICULTY: 0
COUGH: 0
CHEST TIGHTNESS: 0
LIGHT-HEADEDNESS: 0
HEADACHES: 0
FEVER: 0
NUMBNESS: 0
WHEEZING: 0

## 2021-06-03 NOTE — PROGRESS NOTES
"      Subjective      Patient ID: Mirela Lopez is a 55 y.o. female.  1965      Mirela is a 55 year old female who presents s/p mechanical fall this past Friday. She slipped and fell at [employer]. She did not hit her head or have LOC. She presented to [facility] via EMS. On exam her 4th and 5th digit on right hand was fractured, had a hematoma on her right elbow. A deep cut on her 3rd right finger required glue. There was also a scape on her right knee. Her rightfoot, her big toe and second toe showed severe bruising. She has a \"shoe\" on that they advised for her to wear for two weeks. She is on blood thinners Brillinta and ASA.     Imaging is below:  X-ray hand right 3+ views   1031 IMPRESSION: AP lateral and oblique views of the right knee show no significant bone or joint abnormality.   X-ray knee right 4+ views   1031 IMPRESSION:  AP lateral and oblique views of the right hand, elbow and humerus show oblique nondisplaced fracture of the fourth proximal phalanx and nondisplaced fracture of the fifth middle phalanx with intra-articular extension into the DIP joint. No   additional fracture or dislocation at the elbow or humerus. No elbow joint effusion.   X-ray hand right 3+ views (EG)     Since then she has been doing fine and has been back to work since then. Per patient her pain is well and has not taken any Tylenol.       The following have been reviewed and updated as appropriate in this visit:  Tobacco  Allergies  Meds  Problems  Med Hx  Surg Hx       Review of Systems   Constitutional: Negative for chills, fatigue and fever.   Respiratory: Negative for cough, chest tightness, shortness of breath and wheezing.    Musculoskeletal: Positive for gait problem and joint swelling.   Neurological: Negative for dizziness, syncope, speech difficulty, weakness, light-headedness, numbness and headaches.       Objective     Vitals:    06/03/21 0945   BP: 132/76   BP Location: Left upper arm   Patient Position: " "Sitting   Pulse: (!) 103   Resp: 16   Temp: 36.2 °C (97.2 °F)   TempSrc: Temporal   SpO2: 96%   Weight: 108 kg (239 lb)   Height: 1.626 m (5' 4\")     Body mass index is 41.02 kg/m².    Physical Exam  Constitutional:       Appearance: Normal appearance. She is normal weight. She is not toxic-appearing.   Cardiovascular:      Rate and Rhythm: Normal rate and regular rhythm.      Pulses: Normal pulses.      Heart sounds: Normal heart sounds.   Pulmonary:      Effort: Pulmonary effort is normal.      Breath sounds: Normal breath sounds.   Musculoskeletal:        Hands:         Feet:       Comments: Splint on 4th and 5th right digit. On right 3rd digit there is a 1 inch wound. Approximated, clean/dry/intact. No s/s infection. Right foot with boot.    Neurological:      Mental Status: She is alert.   Psychiatric:         Mood and Affect: Mood normal.         Behavior: Behavior normal.         Thought Content: Thought content normal.         Judgment: Judgment normal.         Assessment/Plan   Diagnoses and all orders for this visit:    Pathological fracture, right finger(s), initial encounter for fracture (Primary)  -     Ambulatory referral to Hand Surgery; Future    Traumatic ecchymosis of right foot, initial encounter  -     X-RAY FOOT RIGHT 3+ VIEWS; Future        "

## 2021-06-14 ENCOUNTER — TELEPHONE (OUTPATIENT)
Dept: PRIMARY CARE | Facility: CLINIC | Age: 56
End: 2021-06-14

## 2021-06-14 DIAGNOSIS — M25.561 ACUTE PAIN OF RIGHT KNEE: Primary | ICD-10-CM

## 2021-06-16 LAB
ALBUMIN SERPL-MCNC: 4.1 G/DL (ref 3.8–4.9)
ALBUMIN/GLOB SERPL: 1.3 {RATIO} (ref 1.2–2.2)
ALP SERPL-CCNC: 102 IU/L (ref 48–121)
ALT SERPL-CCNC: 22 IU/L (ref 0–32)
AST SERPL-CCNC: 19 IU/L (ref 0–40)
BILIRUB SERPL-MCNC: 0.3 MG/DL (ref 0–1.2)
BUN SERPL-MCNC: 9 MG/DL (ref 6–24)
BUN/CREAT SERPL: 15 (ref 9–23)
CALCIUM SERPL-MCNC: 9.1 MG/DL (ref 8.7–10.2)
CHLORIDE SERPL-SCNC: 105 MMOL/L (ref 96–106)
CHOLEST SERPL-MCNC: 105 MG/DL (ref 100–199)
CO2 SERPL-SCNC: 21 MMOL/L (ref 20–29)
CREAT SERPL-MCNC: 0.59 MG/DL (ref 0.57–1)
FERRITIN SERPL-MCNC: 135 NG/ML (ref 15–150)
GLOBULIN SER CALC-MCNC: 3.1 G/DL (ref 1.5–4.5)
GLUCOSE SERPL-MCNC: 147 MG/DL (ref 65–99)
HBA1C MFR BLD: 7.6 % (ref 4.8–5.6)
HDLC SERPL-MCNC: 31 MG/DL
IRON SATN MFR SERPL: 13 % (ref 15–55)
IRON SERPL-MCNC: 43 UG/DL (ref 27–159)
LAB CORP EGFR IF AFRICN AM: 121 ML/MIN/1.73
LAB CORP EGFR IF NONAFRICN AM: 105 ML/MIN/1.73
LDLC SERPL CALC-MCNC: 50 MG/DL (ref 0–99)
POTASSIUM SERPL-SCNC: 4.4 MMOL/L (ref 3.5–5.2)
PROT SERPL-MCNC: 7.2 G/DL (ref 6–8.5)
SODIUM SERPL-SCNC: 140 MMOL/L (ref 134–144)
T4 FREE SERPL-MCNC: 1.11 NG/DL (ref 0.82–1.77)
TIBC SERPL-MCNC: 339 UG/DL (ref 250–450)
TRIGL SERPL-MCNC: 137 MG/DL (ref 0–149)
TSH SERPL DL<=0.005 MIU/L-ACNC: 2.42 UIU/ML (ref 0.45–4.5)
UIBC SERPL-MCNC: 296 UG/DL (ref 131–425)
VLDLC SERPL CALC-MCNC: 24 MG/DL (ref 5–40)

## 2021-06-18 ENCOUNTER — HOSPITAL ENCOUNTER (OUTPATIENT)
Dept: RADIOLOGY | Facility: HOSPITAL | Age: 56
Discharge: HOME | End: 2021-06-18
Attending: NURSE PRACTITIONER
Payer: COMMERCIAL

## 2021-06-18 DIAGNOSIS — S90.31XA TRAUMATIC ECCHYMOSIS OF RIGHT FOOT, INITIAL ENCOUNTER: ICD-10-CM

## 2021-06-18 DIAGNOSIS — M25.561 ACUTE PAIN OF RIGHT KNEE: ICD-10-CM

## 2021-06-18 PROCEDURE — 73564 X-RAY EXAM KNEE 4 OR MORE: CPT | Mod: RT

## 2021-06-18 PROCEDURE — 73630 X-RAY EXAM OF FOOT: CPT | Mod: RT

## 2021-06-22 DIAGNOSIS — I73.9 CLAUDICATION (CMS/HCC): Primary | ICD-10-CM

## 2021-06-23 ENCOUNTER — OFFICE VISIT (OUTPATIENT)
Dept: PRIMARY CARE | Facility: CLINIC | Age: 56
End: 2021-06-23
Payer: COMMERCIAL

## 2021-06-23 ENCOUNTER — HOSPITAL ENCOUNTER (OUTPATIENT)
Dept: CARDIOLOGY | Facility: CLINIC | Age: 56
Discharge: HOME | End: 2021-06-23
Attending: SURGERY
Payer: COMMERCIAL

## 2021-06-23 ENCOUNTER — OFFICE VISIT (OUTPATIENT)
Dept: VASCULAR SURGERY | Facility: CLINIC | Age: 56
End: 2021-06-23
Payer: COMMERCIAL

## 2021-06-23 VITALS
RESPIRATION RATE: 16 BRPM | SYSTOLIC BLOOD PRESSURE: 120 MMHG | HEIGHT: 64 IN | BODY MASS INDEX: 40.12 KG/M2 | TEMPERATURE: 97.6 F | DIASTOLIC BLOOD PRESSURE: 78 MMHG | OXYGEN SATURATION: 97 % | HEART RATE: 86 BPM | WEIGHT: 235 LBS

## 2021-06-23 VITALS — WEIGHT: 239 LBS | BODY MASS INDEX: 40.8 KG/M2 | HEIGHT: 64 IN

## 2021-06-23 VITALS — HEART RATE: 93 BPM | OXYGEN SATURATION: 97 %

## 2021-06-23 DIAGNOSIS — I73.9 PERIPHERAL VASCULAR DISEASE (CMS/HCC): Primary | ICD-10-CM

## 2021-06-23 DIAGNOSIS — E61.1 IRON DEFICIENCY: ICD-10-CM

## 2021-06-23 DIAGNOSIS — E11.9 TYPE 2 DIABETES MELLITUS WITHOUT COMPLICATION, WITHOUT LONG-TERM CURRENT USE OF INSULIN (CMS/HCC): Primary | ICD-10-CM

## 2021-06-23 DIAGNOSIS — I73.9 CLAUDICATION (CMS/HCC): ICD-10-CM

## 2021-06-23 PROCEDURE — 93923 UPR/LXTR ART STDY 3+ LVLS: CPT | Performed by: SURGERY

## 2021-06-23 PROCEDURE — 3074F SYST BP LT 130 MM HG: CPT | Performed by: INTERNAL MEDICINE

## 2021-06-23 PROCEDURE — 3008F BODY MASS INDEX DOCD: CPT | Performed by: INTERNAL MEDICINE

## 2021-06-23 PROCEDURE — 3078F DIAST BP <80 MM HG: CPT | Performed by: INTERNAL MEDICINE

## 2021-06-23 PROCEDURE — 99213 OFFICE O/P EST LOW 20 MIN: CPT | Performed by: INTERNAL MEDICINE

## 2021-06-23 PROCEDURE — 99214 OFFICE O/P EST MOD 30 MIN: CPT | Performed by: SURGERY

## 2021-06-23 ASSESSMENT — PATIENT HEALTH QUESTIONNAIRE - PHQ9: SUM OF ALL RESPONSES TO PHQ9 QUESTIONS 1 & 2: 0

## 2021-06-23 NOTE — LETTER
2021     Nora Gudino DO  [address] [facility] [address]    Patient: Mirela Lopez  YOB: 1965  Date of Visit: 2021      Dear Dr. Gudino:    Thank you for referring Mirela Lopez to me for evaluation. Below are my notes for this consultation.    If you have questions, please do not hesitate to call me. I look forward to following your patient along with you.         Sincerely,        Sandip Ugalde MD        CC: MD Tram Galan Robert J, MD  2021  7:49 AM  Signed       [facility]  Follow-up Office Note  @ [facility]        DETAILS OF CONSULTATION   2021  Mirela Lopez               YOB: 1965  [id number]    Consulting Service:  [facility]    PCP:  Nora Gudino DO    Diagnosis:  PAD     HISTORY OF PRESENT ILLNESS        Mirela Lopez  is a 56 y.o. female returning to the office for continued management of PAD.  She did have a repeat exam today where we performed a PVR, her ankle indices were within normal ranges.  Her walking has not been an issue, she has however had coronary issues.  She continues ongoing medical management with diabetes as well as coronary artery disease.    Continues on Brilinta and Lipitor aspirin.  We did review her most recent peripheral study today.      PAST MEDICAL AND SURGICAL HISTORY        Past Medical History:   Diagnosis Date   • GERD (gastroesophageal reflux disease)    • Hyperlipidemia    • Peripheral vascular disease (CMS/HCC)     Right iliac claudication.  Aortogram demonstrated near occlusion of the right common iliac.  Successful right iliac stent 2017       Past Surgical History:   Procedure Laterality Date   • CARDIAC CATHETERIZATION      LAD tortuous, no significant disease.  Dominant RCA-patent.  90% proximal cx, 95% mid cx and distal thrombus   • CARPAL TUNNEL RELEASE     •  SECTION     • CORONARY  STENT PLACEMENT  2017    NERI ×2 left circumflex-performed in [place]   • ILIAC ARTERY STENT  10/2017    Dr. Ugalde       MEDICATIONS        Current Outpatient Medications on File Prior to Visit   Medication Sig Dispense Refill   • aspirin 81 mg enteric coated tablet Take 81 mg by mouth daily.     • atorvastatin (LIPITOR) 80 mg tablet TAKE ONE TABLET BY MOUTH DAILY 30 tablet 5   • ferrous sulfate 134 mg (27 mg iron) tablet Take 1 tablet by mouth daily with breakfast.    6   • glipiZIDE (GLUCOTROL XL) 5 mg 24 hr tablet Take 5 mg by mouth daily.     • lisinopriL (PRINIVIL) 10 mg tablet TAKE ONE TABLET BY MOUTH DAILY 30 tablet 5   • metFORMIN XR (GLUCOPHAGE-XR) 750 mg 24 hr tablet Take 2 tablets (1,500 mg total) by mouth daily. With evening meal.     • metoprolol succinate XL (TOPROL-XL) 100 mg 24 hr tablet TAKE ONE TABLET BY MOUTH DAILY 30 tablet 5   • nitroglycerin (NITROSTAT) 0.4 mg SL tablet Place 1 tablet (0.4 mg total) under the tongue every 5 (five) minutes as needed for chest pain. 25 tablet 3   • ticagrelor (BRILINTA) 90 mg tablet Take 1 tablet (90 mg total) by mouth 2 (two) times a day. 60 tablet 3     No current facility-administered medications on file prior to visit.       ALLERGIES        Adhesive     PHYSICAL EXAMINATION      Visit Vitals  Pulse 93   SpO2 97%     There is no height or weight on file to calculate BMI.      Physical Exam  Vitals reviewed.   Constitutional:       Appearance: She is well-developed.   HENT:      Head: Normocephalic and atraumatic.   Eyes:      Pupils: Pupils are equal, round, and reactive to light.   Neck:      Trachea: No tracheal deviation.   Cardiovascular:      Rate and Rhythm: Normal rate.   Pulmonary:      Effort: Pulmonary effort is normal.      Breath sounds: Normal breath sounds.   Abdominal:      Palpations: Abdomen is soft. There is no mass.   Musculoskeletal:         General: Normal range of motion.      Cervical back: Normal range of motion and neck supple.    Skin:     General: Skin is warm and dry.   Neurological:      Mental Status: She is alert and oriented to person, place, and time.         LABS / IMAGING / EKG        Imaging  I have independently reviewed the pertinent imaging from the last 24 hrs. and Significant findings include:         ASSESSMENT AND PLAN         Problem List Items Addressed This Visit        Circulatory    Peripheral vascular disease (CMS/HCC) - Primary    Overview     Overview:                   No follow-ups on file.     Sandip Ugalde MD, FACS      Vascular and Endovascular Specialist  [place] [address]  Phone [phone]  Fax  [phone]     Thank you very much for allowing us to participate in the care of your patient.  Please not hesitate to call or email if there are any questions.  Sincerely.    This document was generated utilizing voice recognition technology. An attempt at proofreading has been made to minimize errors but typographical errors may be present.

## 2021-06-23 NOTE — PROGRESS NOTES
[facility]  Follow-up Office Note  @ [facility]        DETAILS OF CONSULTATION   2021  Mirela Lopez               YOB: 1965  [id number]    Consulting Service:  [facility] IN [facility]    PCP:  Nora Gudino DO    Diagnosis:  PAD     HISTORY OF PRESENT ILLNESS        Mirela Lopez  is a 56 y.o. female returning to the office for continued management of PAD.  She did have a repeat exam today where we performed a PVR, her ankle indices were within normal ranges.  Her walking has not been an issue, she has however had coronary issues.  She continues ongoing medical management with diabetes as well as coronary artery disease.    Continues on Brilinta and Lipitor aspirin.  We did review her most recent peripheral study today.      PAST MEDICAL AND SURGICAL HISTORY        Past Medical History:   Diagnosis Date   • GERD (gastroesophageal reflux disease)    • Hyperlipidemia    • Peripheral vascular disease (CMS/HCC) 2017    Right iliac claudication.  Aortogram demonstrated near occlusion of the right common iliac.  Successful right iliac stent 2017       Past Surgical History:   Procedure Laterality Date   • CARDIAC CATHETERIZATION      LAD tortuous, no significant disease.  Dominant RCA-patent.  90% proximal cx, 95% mid cx and distal thrombus   • CARPAL TUNNEL RELEASE     •  SECTION     • CORONARY STENT PLACEMENT  2017    NERI ×2 left circumflex-performed in [place]   • ILIAC ARTERY STENT  10/2017    Dr. Ugalde       MEDICATIONS        Current Outpatient Medications on File Prior to Visit   Medication Sig Dispense Refill   • aspirin 81 mg enteric coated tablet Take 81 mg by mouth daily.     • atorvastatin (LIPITOR) 80 mg tablet TAKE ONE TABLET BY MOUTH DAILY 30 tablet 5   • ferrous sulfate 134 mg (27 mg iron) tablet Take 1 tablet by mouth daily with breakfast.    6   • glipiZIDE (GLUCOTROL XL) 5 mg 24 hr tablet Take 5 mg  by mouth daily.     • lisinopriL (PRINIVIL) 10 mg tablet TAKE ONE TABLET BY MOUTH DAILY 30 tablet 5   • metFORMIN XR (GLUCOPHAGE-XR) 750 mg 24 hr tablet Take 2 tablets (1,500 mg total) by mouth daily. With evening meal.     • metoprolol succinate XL (TOPROL-XL) 100 mg 24 hr tablet TAKE ONE TABLET BY MOUTH DAILY 30 tablet 5   • nitroglycerin (NITROSTAT) 0.4 mg SL tablet Place 1 tablet (0.4 mg total) under the tongue every 5 (five) minutes as needed for chest pain. 25 tablet 3   • ticagrelor (BRILINTA) 90 mg tablet Take 1 tablet (90 mg total) by mouth 2 (two) times a day. 60 tablet 3     No current facility-administered medications on file prior to visit.       ALLERGIES        Adhesive     PHYSICAL EXAMINATION      Visit Vitals  Pulse 93   SpO2 97%     There is no height or weight on file to calculate BMI.      Physical Exam  Vitals reviewed.   Constitutional:       Appearance: She is well-developed.   HENT:      Head: Normocephalic and atraumatic.   Eyes:      Pupils: Pupils are equal, round, and reactive to light.   Neck:      Trachea: No tracheal deviation.   Cardiovascular:      Rate and Rhythm: Normal rate.   Pulmonary:      Effort: Pulmonary effort is normal.      Breath sounds: Normal breath sounds.   Abdominal:      Palpations: Abdomen is soft. There is no mass.   Musculoskeletal:         General: Normal range of motion.      Cervical back: Normal range of motion and neck supple.   Skin:     General: Skin is warm and dry.   Neurological:      Mental Status: She is alert and oriented to person, place, and time.         LABS / IMAGING / EKG        Imaging  I have independently reviewed the pertinent imaging from the last 24 hrs. and Significant findings include:         ASSESSMENT AND PLAN         Problem List Items Addressed This Visit        Circulatory    Peripheral vascular disease (CMS/HCC) - Primary    Overview     Overview:                   No follow-ups on file.     Sandip Ugalde MD,  FACS      Vascular and Endovascular Specialist  [place] [address]  Phone [phone]  Fax  [phone]     Thank you very much for allowing us to participate in the care of your patient.  Please not hesitate to call or email if there are any questions.  Sincerely.    This document was generated utilizing voice recognition technology. An attempt at proofreading has been made to minimize errors but typographical errors may be present.

## 2021-06-23 NOTE — PROGRESS NOTES
"  Subjective      Patient ID: Mirela Lopez is a 56 y.o. female.  1965      She is healing from her fall, fingers x 2 likely to not need surgery wrist is splinted currently. Still having some knee pain but not as bad.  This is with weight bearing  A1c 7.3 she had missed medications much of the time prior to bloodwork.  Has not checked sugars she is having a tough time controlling carbohydrates.  She does not want to do finger sticks.  Declines seeing nutrition.  Amenable to a sensor device. She is tolerating Jardiance.    Mild iron deficency that started at [facility] of son. Take iron.   Had cologard that was negative.  No longer gets periods. Declines GI work up.        The following have been reviewed and updated as appropriate in this visit:  Tobacco  Allergies  Meds  Problems  Med Hx  Surg Hx  Fam Hx  Soc Hx        Review of Systems   All other systems reviewed and are negative.      Objective     Vitals:    06/23/21 1152   BP: 120/78   BP Location: Right upper arm   Patient Position: Sitting   Pulse: 86   Resp: 16   Temp: 36.4 °C (97.6 °F)   TempSrc: Temporal   SpO2: 97%   Weight: 107 kg (235 lb)   Height: 1.626 m (5' 4\")     Body mass index is 40.34 kg/m².    Physical Exam  Vitals reviewed.   Constitutional:       Appearance: She is well-developed.   HENT:      Head: Normocephalic.   Cardiovascular:      Rate and Rhythm: Normal rate and regular rhythm.      Heart sounds: Normal heart sounds.   Pulmonary:      Effort: Pulmonary effort is normal.      Breath sounds: Normal breath sounds.   Musculoskeletal:         General: No swelling.      Cervical back: Normal range of motion and neck supple.         Assessment/Plan   Diagnoses and all orders for this visit:    Type 2 diabetes mellitus without complication, without long-term current use of insulin (CMS/[facility]) (Primary)  -     Ambulatory referral to Endocrinology; Future  -     empagliflozin (JARDIANCE) 25 mg tablet; Take 1 tablet (25 mg total) by mouth " daily.  Regular medications are now being taken consistently  Cont Glucotorol ultimately would like to see pt on a medication such as Ozempic or Rybellsus depending on insurance  Declines Nutrition counseling  Posible candidate for sensor device as opposed to fingersticks     Iron deficiency    Once daily iron had cologard testing  Follow levels

## 2021-06-24 LAB
BSA FOR ECHO PROCEDURE: 2.21 M2
LEFT 1ST TOE INDEX: 0.75
LEFT 1ST TOE: 97 MMHG
LEFT ABI: 0.98
LEFT ARM BP: 129 MMHG
LEFT DORSALIS PEDIS INDEX: 0.71
LEFT DORSALIS PEDIS: 91 MMHG
LEFT LOW THIGH INDEX: 1.03
LEFT LOW THIGH: 133 MMHG
LEFT POST TIBIAL INDEX: 0.98
LEFT POSTERIOR TIBIAL: 127 MMHG
LEFT PROXIMAL CALF INDEX: 0.92
LEFT PROXIMAL CALF: 119 MMHG
LEFT TBI: 0.75
RIGHT 1ST TOE INDEX: 0.77
RIGHT 1ST TOE: 99 MMHG
RIGHT ABI: 1.12
RIGHT ARM BP: 122 MMHG
RIGHT DORSALIS PEDIS INDEX: 0.88
RIGHT DORSALIS PEDIS: 113 MMHG
RIGHT LOW THIGH INDEX: 1.28
RIGHT LOW THIGH: 165 MMHG
RIGHT POST TIBIAL INDEX: 1.12
RIGHT POSTERIOR TIBIAL: 144 MMHG
RIGHT PROXIMAL CALF INDEX: 1.12
RIGHT PROXIMAL CALF: 145 MMHG
RIGHT TBI: 0.77

## 2021-07-27 NOTE — PROGRESS NOTES
"     Cardiology Office Visit     Patient ID: Mirela Lopez 56 y.o. female 1965  PCP: Nora Gudino DO   History Present Illness     Mirela Lopez returns to the office today for follow-up after undergoing percutaneous intervention for a 90% LAD lesion in March of this year.  Mirela has a history of coronary disease and had circumflex intervention several years ago while on vacation in [place].  At that time she was also found to have significant peripheral vascular disease and has an iliac stent.  She is diabetic, hypertensive and overweight, but has been trying to make lifestyle changes.  She states today that she feels well and she has no chest discomfort.  She does not describe any significant dyspnea on exertion.  Mirela has not had any significant claudication and her recent vascular evaluation was unremarkable.  We reviewed all of her interval studies.  Her EKG does not show any acute changes.  Her weight is down to 230 pounds, which is a decrease of almost 15 pounds since February of this year.  She and her family recently returned from vacation in [place] and she said \"[place] has a lot of walking\" and she feels she did well with this.    Allergies/Medications   Allergies:Adhesive    Medications:  Current Outpatient Medications   Medication Instructions   • aspirin 81 mg, oral, Daily   • atorvastatin (LIPITOR) 80 mg, oral, Daily (6a)   • empagliflozin (JARDIANCE) 25 mg, oral, Daily   • ferrous sulfate 134 mg (27 mg iron) tablet 1 tablet, oral, Daily with breakfast   • glipiZIDE (GLUCOTROL XL) 5 mg, oral, Daily   • lisinopriL (PRINIVIL) 10 mg, oral, Daily (6a)   • metFORMIN XR (GLUCOPHAGE-XR) 1,500 mg, oral, Daily, With evening meal.   • metoprolol succinate XL (TOPROL-XL) 100 mg, oral, Daily (6a)   • nitroglycerin (NITROSTAT) 0.4 mg, sublingual, Every 5 min PRN   • ticagrelor (BRILINTA) 90 mg, oral, 2 times daily      Physical Exam     Vitals:    08/03/21 1002   BP: 124/70   BP Location: Left " "upper arm   Patient Position: Sitting   Pulse: 75   Resp: 16   Weight: 104 kg (230 lb)   Height: 1.626 m (5' 4\")     BP Readings from Last 3 Encounters:   08/03/21 124/70   06/23/21 120/78   06/03/21 132/76     Wt Readings from Last 3 Encounters:   08/03/21 104 kg (230 lb)   06/23/21 108 kg (239 lb)   06/23/21 107 kg (235 lb)      Physical Exam:  Constitutional: Obese white female no apparent distress  Eyes: No icterus  ENT:  Mucosa moist  Neck: Supple, no JVD or hepatojugular reflux.  No bruits.  Cardiac: Normal S1 and S2, regular rate and rhythm, no S3.  No rub.  No ectopy.  There is 1/6 systolic murmur at the left sternal border.  Lungs: Clear to auscultation bilaterally.  No wheezing.  Abdomen: Soft, nontender, positive normoactive bowel sounds.  No bruit.  Obese abdomen.  Extremities: No clubbing or cyanosis.  No calf tenderness.  No edema.  Distal pulses are intact.  Skin: Warm and dry.  No jaundice.  Musculoskeletal: No joint swelling or erythema.  Neurologic: Awake, alert, oriented.  Moving all extremities.  Psychiatric: No agitation.    Labs/Imaging/Procedures   Labs  Lab Results   Component Value Date    WBC 7.56 03/30/2021    HGB 11.9 03/30/2021     03/30/2021    ALT 22 06/15/2021    AST 19 06/15/2021     06/15/2021    K 4.4 06/15/2021     06/15/2021    CREATININE 0.59 06/15/2021    BUN 9 06/15/2021    CO2 21 06/15/2021    TSH 2.420 06/15/2021    INR 1.0 03/25/2021    HGBA1C 7.6 (H) 06/15/2021     Lab Results   Component Value Date    CHOL 105 06/15/2021    HDL 31 (L) 06/15/2021     Lab Results   Component Value Date    LDLCALC 50 06/15/2021     Imaging  PVR lower extremity 6/23/2021: Normal ABIs bilaterally at rest.  History of kissing iliac stents.      Cardiac catheterization 3/30/2021: Discrete 90% stenosis in the mid LAD followed by tubular 70% stenosis.  Prior left circumflex stents patent.  Patient had successful NERI of LAD lesions.      Echo 3/10/2021:  Technically difficult " study secondary to body habitus. Overall normal LV size with mild concentric LVH.  LVEF 65-70%.  No obvious regional wall motion abnormalities but endocardium difficult to visualize.  Impaired relaxation. The RV  is difficult to visualize in subcostal views are of poor quality.  The RV is grossly normal in size and function in the apical views. · Trileaflet aortic valve.  No aortic stenosis.  Sclerotic mitral valve leaflets with trace MR.  No significant pericardial effusion.    Exercise MPI 3/10/2021:  Abnormal exercise nuclear stress test.  Reversible defects are noted in the lateral wall, apex, and anterior wall suggestive of ischemia.  No prior study for comparison.  LVEF 67%.  No transient ischemic dilatation.  EKG portion is negative for arrhythmias or ischemic changes.  Patient exercised on Abner protocol for 3 minutes 4.6 METS.  Poor exercise tolerance.  No chest pain reported.  Peak blood pressure 144/88 mmHg.  Patient achieved 92% of predicted maximal heart rate.    LE arterial duplex bilateral 7/22/2020: Triphasic waveforms throughout the lower extremity with at least two-vessel runoff to the foot.  The left lower extremity has triphasic waveforms with at least two-vessel runoff.  There is a probable stenosis at the origin of the profunda.  No significant change.       LE arterial duplex bilateral 7/17/2019: Triphasic and biphasic waveforms throughout the right and left lower extremities.  No evidence of discrete stenosis or obstruction.       Exercise stress echocardiogram 4/19/2019: Very technically difficult study.  Exercise EKG negative for ischemia or arrhythmia. Poor exercise capacity completing only stage I Abner protocol.  Exercise EKG negative for ischemia or arrhythmia at that workload.  At rest, normal LV size and function with LVEF 60-65%.  Basal mid inferior hypokinesis.  Mildly dilated RV with preserved function.  Trace TR.  Trace MR.  Trileaflet aortic valve.  No significant stenosis or  insufficiency.  Prominent pericardial fat pad.  Immediately following exercise overall LV function improves.  Persistent basal inferior hypokinesis consistent with scar. No ischemia.       PVR 1/9/2019: Right JOURDAN 1.12 at rest and 1.1 following exercise.  Left JOURDAN 0.95 at rest and 0.96 following exercise.  Normal segmental pressures and PVRs.       Periph Intervention 10/2017: Successful right iliac stent.     Abd Ao Duplex 9/2017: Elevated velocities right MICHAEL with monophasic flow.     Echo 9/2017: LVEF 65%, Top normal LV size with overall preserved function. No significant pulmonary hypertension by Doppler. Technically difficult study.     Cath 8/13/17: LAD tortuous, no significant disease. RCA dominant and patent. Left circumflex with 90% proximal lesion, 95% mid lesion and distal thrombus. Successful NERI ×2 left circumflex. Occluded right MICHAEL at origin with collaterals. Patent left MICHAEL and CFA    EKG  Assessment/Plan     1. CAD in native artery  Mirela is doing well following her recent intervention in the LAD.  She has multivessel CAD at the age of only 56 and will need ongoing aggressive risk factor modification.  Her LDL is excellent.  She is on Jardiance for her diabetes.  She should continue dual antiplatelet therapy with aspirin and Brilinta.  She remains on Toprol- mg daily.  When she completes her dual antiplatelet therapy, I think she would be an excellent candidate for low-dose Xarelto 2.5 mg twice daily.    2. Essential hypertension  Blood pressure is well controlled.  Continue lisinopril and metoprolol.  If she continues losing weight, we may be able to decrease some of her medications.    3. Peripheral vascular disease (CMS/[facility])  We reviewed her most recent peripheral vascular evaluation.  She has no lifestyle limiting claudication.  Continue aspirin and statin therapy.    4. Type 2 diabetes mellitus without complication, without long-term current use of insulin (CMS/[facility])   Continue Jardiance,  glipizide and Metformin.  She is making an effort with diabetic diet and exercise.  EKG    5. Abnormal EKG  Is abnormal but unchanged.     Return in about 6 months (around 2/3/2022) for follow-up, earlier if any change in symptoms.  Available medical records reviewed and updated including laboratory data, imaging studies, previous outpatient and inpatient records.  Counseling/education performed. Care everywhere utilized where appropriate.    Thank you for allowing me to participate in the care of this patient.  I hope this information is helpful.      Zara Mccloud MD [facility]  8/7/2021  11:01 AM  This document was generated utilizing voice recognition technology. Please excuse any typographical errors which may be present.

## 2021-08-03 ENCOUNTER — OFFICE VISIT (OUTPATIENT)
Dept: CARDIOLOGY | Facility: CLINIC | Age: 56
End: 2021-08-03
Payer: COMMERCIAL

## 2021-08-03 VITALS
SYSTOLIC BLOOD PRESSURE: 124 MMHG | HEIGHT: 64 IN | WEIGHT: 230 LBS | BODY MASS INDEX: 39.27 KG/M2 | DIASTOLIC BLOOD PRESSURE: 70 MMHG | HEART RATE: 75 BPM | RESPIRATION RATE: 16 BRPM

## 2021-08-03 DIAGNOSIS — I25.10 CAD IN NATIVE ARTERY: Primary | ICD-10-CM

## 2021-08-03 DIAGNOSIS — I73.9 PERIPHERAL VASCULAR DISEASE (CMS/HCC): ICD-10-CM

## 2021-08-03 DIAGNOSIS — E11.9 TYPE 2 DIABETES MELLITUS WITHOUT COMPLICATION, WITHOUT LONG-TERM CURRENT USE OF INSULIN (CMS/HCC): ICD-10-CM

## 2021-08-03 DIAGNOSIS — I10 ESSENTIAL HYPERTENSION: ICD-10-CM

## 2021-08-03 DIAGNOSIS — R94.31 ABNORMAL EKG: ICD-10-CM

## 2021-08-03 DIAGNOSIS — I25.10 CORONARY ARTERY DISEASE INVOLVING NATIVE CORONARY ARTERY OF NATIVE HEART WITHOUT ANGINA PECTORIS: ICD-10-CM

## 2021-08-03 PROCEDURE — 3078F DIAST BP <80 MM HG: CPT | Performed by: INTERNAL MEDICINE

## 2021-08-03 PROCEDURE — 3074F SYST BP LT 130 MM HG: CPT | Performed by: INTERNAL MEDICINE

## 2021-08-03 PROCEDURE — 99214 OFFICE O/P EST MOD 30 MIN: CPT | Performed by: INTERNAL MEDICINE

## 2021-08-03 PROCEDURE — 3008F BODY MASS INDEX DOCD: CPT | Performed by: INTERNAL MEDICINE

## 2021-08-03 PROCEDURE — 93000 ELECTROCARDIOGRAM COMPLETE: CPT | Performed by: INTERNAL MEDICINE

## 2021-08-03 RX ORDER — ATORVASTATIN CALCIUM 80 MG/1
80 TABLET, FILM COATED ORAL
Qty: 30 TABLET | Refills: 5 | Status: SHIPPED | OUTPATIENT
Start: 2021-08-03 | End: 2022-02-14

## 2021-08-03 RX ORDER — METOPROLOL SUCCINATE 100 MG/1
100 TABLET, EXTENDED RELEASE ORAL
Qty: 30 TABLET | Refills: 5 | Status: SHIPPED | OUTPATIENT
Start: 2021-08-03 | End: 2022-02-14

## 2021-08-03 RX ORDER — LISINOPRIL 10 MG/1
10 TABLET ORAL
Qty: 30 TABLET | Refills: 5 | Status: SHIPPED | OUTPATIENT
Start: 2021-08-03 | End: 2022-02-21

## 2021-08-03 NOTE — LETTER
"August 7, 2021     Nora Gudino DO  [address] [facility] [address]    Patient: Mirela Lopez  YOB: 1965  Date of Visit: 8/3/2021      Dear Dr. Gudino:    Thank you for referring Mirela Lopez to me for evaluation. Below are my notes for this consultation.    If you have questions, please do not hesitate to call me. I look forward to following your patient along with you.         Sincerely,        Zara Mccloud MD        CC: MD Oneyda Naik Erin, MD  8/7/2021 11:14 AM  Signed       Cardiology Office Visit     Patient ID: Mirela Lopez 56 y.o. female 1965  PCP: Nora Gudino DO   History Present Illness     Mirela Lopez returns to the office today for follow-up after undergoing percutaneous intervention for a 90% LAD lesion in March of this year.  Mirela has a history of coronary disease and had circumflex intervention several years ago while on vacation in [place].  At that time she was also found to have significant peripheral vascular disease and has an iliac stent.  She is diabetic, hypertensive and overweight, but has been trying to make lifestyle changes.  She states today that she feels well and she has no chest discomfort.  She does not describe any significant dyspnea on exertion.  Mirela has not had any significant claudication and her recent vascular evaluation was unremarkable.  We reviewed all of her interval studies.  Her EKG does not show any acute changes.  Her weight is down to 230 pounds, which is a decrease of almost 15 pounds since February of this year.  She and her family recently returned from vacation in [place] and she said \"[place] has a lot of walking\" and she feels she did well with this.    Allergies/Medications   Allergies:Adhesive    Medications:  Current Outpatient Medications   Medication Instructions   • aspirin 81 mg, oral, Daily   • atorvastatin (LIPITOR) 80 mg, oral, Daily (6a)   • empagliflozin (JARDIANCE) 25 " "mg, oral, Daily   • ferrous sulfate 134 mg (27 mg iron) tablet 1 tablet, oral, Daily with breakfast   • glipiZIDE (GLUCOTROL XL) 5 mg, oral, Daily   • lisinopriL (PRINIVIL) 10 mg, oral, Daily (6a)   • metFORMIN XR (GLUCOPHAGE-XR) 1,500 mg, oral, Daily, With evening meal.   • metoprolol succinate XL (TOPROL-XL) 100 mg, oral, Daily (6a)   • nitroglycerin (NITROSTAT) 0.4 mg, sublingual, Every 5 min PRN   • ticagrelor (BRILINTA) 90 mg, oral, 2 times daily      Physical Exam     Vitals:    08/03/21 1002   BP: 124/70   BP Location: Left upper arm   Patient Position: Sitting   Pulse: 75   Resp: 16   Weight: 104 kg (230 lb)   Height: 1.626 m (5' 4\")     BP Readings from Last 3 Encounters:   08/03/21 124/70   06/23/21 120/78   06/03/21 132/76     Wt Readings from Last 3 Encounters:   08/03/21 104 kg (230 lb)   06/23/21 108 kg (239 lb)   06/23/21 107 kg (235 lb)      Physical Exam:  Constitutional: Obese white female no apparent distress  Eyes: No icterus  ENT:  Mucosa moist  Neck: Supple, no JVD or hepatojugular reflux.  No bruits.  Cardiac: Normal S1 and S2, regular rate and rhythm, no S3.  No rub.  No ectopy.  There is 1/6 systolic murmur at the left sternal border.  Lungs: Clear to auscultation bilaterally.  No wheezing.  Abdomen: Soft, nontender, positive normoactive bowel sounds.  No bruit.  Obese abdomen.  Extremities: No clubbing or cyanosis.  No calf tenderness.  No edema.  Distal pulses are intact.  Skin: Warm and dry.  No jaundice.  Musculoskeletal: No joint swelling or erythema.  Neurologic: Awake, alert, oriented.  Moving all extremities.  Psychiatric: No agitation.    Labs/Imaging/Procedures   Labs  Lab Results   Component Value Date    WBC 7.56 03/30/2021    HGB 11.9 03/30/2021     03/30/2021    ALT 22 06/15/2021    AST 19 06/15/2021     06/15/2021    K 4.4 06/15/2021     06/15/2021    CREATININE 0.59 06/15/2021    BUN 9 06/15/2021    CO2 21 06/15/2021    TSH 2.420 06/15/2021    INR 1.0 " 03/25/2021    HGBA1C 7.6 (H) 06/15/2021     Lab Results   Component Value Date    CHOL 105 06/15/2021    HDL 31 (L) 06/15/2021     Lab Results   Component Value Date    LDLCALC 50 06/15/2021     Imaging  PVR lower extremity 6/23/2021: Normal ABIs bilaterally at rest.  History of kissing iliac stents.      Cardiac catheterization 3/30/2021: Discrete 90% stenosis in the mid LAD followed by tubular 70% stenosis.  Prior left circumflex stents patent.  Patient had successful NERI of LAD lesions.      Echo 3/10/2021:  Technically difficult study secondary to body habitus. Overall normal LV size with mild concentric LVH.  LVEF 65-70%.  No obvious regional wall motion abnormalities but endocardium difficult to visualize.  Impaired relaxation. The RV  is difficult to visualize in subcostal views are of poor quality.  The RV is grossly normal in size and function in the apical views. · Trileaflet aortic valve.  No aortic stenosis.  Sclerotic mitral valve leaflets with trace MR.  No significant pericardial effusion.    Exercise MPI 3/10/2021:  Abnormal exercise nuclear stress test.  Reversible defects are noted in the lateral wall, apex, and anterior wall suggestive of ischemia.  No prior study for comparison.  LVEF 67%.  No transient ischemic dilatation.  EKG portion is negative for arrhythmias or ischemic changes.  Patient exercised on Abner protocol for 3 minutes 4.6 METS.  Poor exercise tolerance.  No chest pain reported.  Peak blood pressure 144/88 mmHg.  Patient achieved 92% of predicted maximal heart rate.    LE arterial duplex bilateral 7/22/2020: Triphasic waveforms throughout the lower extremity with at least two-vessel runoff to the foot.  The left lower extremity has triphasic waveforms with at least two-vessel runoff.  There is a probable stenosis at the origin of the profunda.  No significant change.       LE arterial duplex bilateral 7/17/2019: Triphasic and biphasic waveforms throughout the right and left lower  extremities.  No evidence of discrete stenosis or obstruction.       Exercise stress echocardiogram 4/19/2019: Very technically difficult study.  Exercise EKG negative for ischemia or arrhythmia. Poor exercise capacity completing only stage I Abner protocol.  Exercise EKG negative for ischemia or arrhythmia at that workload.  At rest, normal LV size and function with LVEF 60-65%.  Basal mid inferior hypokinesis.  Mildly dilated RV with preserved function.  Trace TR.  Trace MR.  Trileaflet aortic valve.  No significant stenosis or insufficiency.  Prominent pericardial fat pad.  Immediately following exercise overall LV function improves.  Persistent basal inferior hypokinesis consistent with scar. No ischemia.       PVR 1/9/2019: Right JOURDAN 1.12 at rest and 1.1 following exercise.  Left JOURDAN 0.95 at rest and 0.96 following exercise.  Normal segmental pressures and PVRs.       Periph Intervention 10/2017: Successful right iliac stent.     Abd Ao Duplex 9/2017: Elevated velocities right MICHAEL with monophasic flow.     Echo 9/2017: LVEF 65%, Top normal LV size with overall preserved function. No significant pulmonary hypertension by Doppler. Technically difficult study.     Cath 8/13/17: LAD tortuous, no significant disease. RCA dominant and patent. Left circumflex with 90% proximal lesion, 95% mid lesion and distal thrombus. Successful NERI ×2 left circumflex. Occluded right MICHAEL at origin with collaterals. Patent left MICHAEL and CFA    EKG  Assessment/Plan     1. CAD in native artery  Mirela is doing well following her recent intervention in the LAD.  She has multivessel CAD at the age of only 56 and will need ongoing aggressive risk factor modification.  Her LDL is excellent.  She is on Jardiance for her diabetes.  She should continue dual antiplatelet therapy with aspirin and Brilinta.  She remains on Toprol- mg daily.  When she completes her dual antiplatelet therapy, I think she would be an excellent candidate for  low-dose Xarelto 2.5 mg twice daily.    2. Essential hypertension  Blood pressure is well controlled.  Continue lisinopril and metoprolol.  If she continues losing weight, we may be able to decrease some of her medications.    3. Peripheral vascular disease (CMS/HCC)  We reviewed her most recent peripheral vascular evaluation.  She has no lifestyle limiting claudication.  Continue aspirin and statin therapy.    4. Type 2 diabetes mellitus without complication, without long-term current use of insulin (CMS/HCC)   Continue Jardiance, glipizide and Metformin.  She is making an effort with diabetic diet and exercise.  EKG    5. Abnormal EKG  Is abnormal but unchanged.     Return in about 6 months (around 2/3/2022) for follow-up, earlier if any change in symptoms.  Available medical records reviewed and updated including laboratory data, imaging studies, previous outpatient and inpatient records.  Counseling/education performed. Care everywhere utilized where appropriate.    Thank you for allowing me to participate in the care of this patient.  I hope this information is helpful.      Zara Mccloud MD [facility], [facility]  8/7/2021  11:01 AM  This document was generated utilizing voice recognition technology. Please excuse any typographical errors which may be present.

## 2021-10-11 ENCOUNTER — OFFICE VISIT (OUTPATIENT)
Dept: PRIMARY CARE | Facility: CLINIC | Age: 56
End: 2021-10-11
Payer: COMMERCIAL

## 2021-10-11 VITALS
OXYGEN SATURATION: 98 % | DIASTOLIC BLOOD PRESSURE: 80 MMHG | SYSTOLIC BLOOD PRESSURE: 120 MMHG | WEIGHT: 220 LBS | HEIGHT: 64 IN | TEMPERATURE: 97.6 F | BODY MASS INDEX: 37.56 KG/M2 | RESPIRATION RATE: 14 BRPM | HEART RATE: 81 BPM

## 2021-10-11 DIAGNOSIS — E78.00 PURE HYPERCHOLESTEROLEMIA: ICD-10-CM

## 2021-10-11 DIAGNOSIS — I10 ESSENTIAL HYPERTENSION: ICD-10-CM

## 2021-10-11 DIAGNOSIS — E11.9 TYPE 2 DIABETES MELLITUS WITHOUT COMPLICATION, WITHOUT LONG-TERM CURRENT USE OF INSULIN (CMS/HCC): Primary | ICD-10-CM

## 2021-10-11 DIAGNOSIS — D76.3 ROSAI-DORFMAN DISEASE (CMS/HCC): ICD-10-CM

## 2021-10-11 DIAGNOSIS — E61.1 IRON DEFICIENCY: ICD-10-CM

## 2021-10-11 PROCEDURE — 3074F SYST BP LT 130 MM HG: CPT | Performed by: INTERNAL MEDICINE

## 2021-10-11 PROCEDURE — 3079F DIAST BP 80-89 MM HG: CPT | Performed by: INTERNAL MEDICINE

## 2021-10-11 PROCEDURE — 99214 OFFICE O/P EST MOD 30 MIN: CPT | Performed by: INTERNAL MEDICINE

## 2021-10-11 PROCEDURE — 3008F BODY MASS INDEX DOCD: CPT | Performed by: INTERNAL MEDICINE

## 2021-10-11 RX ORDER — METFORMIN HYDROCHLORIDE 750 MG/1
TABLET, EXTENDED RELEASE ORAL
Qty: 180 TABLET | Refills: 3 | Status: SHIPPED | OUTPATIENT
Start: 2021-10-11 | End: 2022-03-01

## 2021-10-11 RX ORDER — GLIPIZIDE 5 MG/1
5 TABLET, FILM COATED, EXTENDED RELEASE ORAL DAILY
Qty: 90 TABLET | Refills: 3 | Status: SHIPPED | OUTPATIENT
Start: 2021-10-11 | End: 2022-10-05

## 2021-10-11 NOTE — PROGRESS NOTES
"  Subjective      Patient ID: Mirela Lopez is a 56 y.o. female.  1965      Arslan is doing well.  She had received her flu shot and covid 19 booster w Pfizer. Notes of Dr DAMASO Heller and Dr Ugalde reviewed for today's visit.  She has voluntarily reduced calories reducing carbs.  Consuming low carb food choices.  BMI 40>>>37, excellent work, expanding her walking. She had a retinal exam that was negative though we do not have records.       The following have been reviewed and updated as appropriate in this visit:  Tobacco  Allergies  Meds  Med Hx  Surg Hx  Fam Hx  Soc Hx      Review of Systems   All other systems reviewed and are negative.      Objective     Vitals:    10/11/21 0934   BP: 120/80   BP Location: Left upper arm   Patient Position: Sitting   Pulse: 81   Resp: 14   Temp: 36.4 °C (97.6 °F)   TempSrc: Temporal   SpO2: 98%   Weight: 99.8 kg (220 lb)   Height: 1.626 m (5' 4\")     Body mass index is 37.76 kg/m².    Physical Exam  Vitals reviewed.   Constitutional:       Appearance: She is well-developed.   HENT:      Head: Normocephalic.   Cardiovascular:      Rate and Rhythm: Normal rate and regular rhythm.      Pulses:           Dorsalis pedis pulses are 3+ on the right side and 3+ on the left side.        Posterior tibial pulses are 3+ on the right side and 3+ on the left side.      Heart sounds: Normal heart sounds.   Pulmonary:      Effort: Pulmonary effort is normal.      Breath sounds: Normal breath sounds.   Musculoskeletal:      Cervical back: Normal range of motion and neck supple.      Right foot: Normal range of motion. No deformity, bunion, Charcot foot, foot drop or prominent metatarsal heads.      Left foot: Normal range of motion. No deformity, bunion, Charcot foot, foot drop or prominent metatarsal heads.   Feet:      Right foot:      Protective Sensation: 10 sites tested. 10 sites sensed.      Skin integrity: Skin integrity normal.      Left foot:      Protective Sensation: 10 sites " tested. 10 sites sensed.      Skin integrity: Skin integrity normal.      Comments: Great toe nails growing outward in good conditions        Assessment/Plan   Diagnoses and all orders for this visit:    Type 2 diabetes mellitus without complication, without long-term current use of insulin (CMS/HCC) (Primary)  -     metFORMIN XR (GLUCOPHAGE-XR) 750 mg 24 hr tablet; Two daily at dinner  -     Comprehensive metabolic panel; Future  -     Hemoglobin A1c; Future  -     Microalbumin / creatinine urine ratio; Future  Cont Jardiance and Glucotrol  Pure hypercholesterolemia  -     Comprehensive metabolic panel; Future  -     Lipid panel; Future  Cont Atorvastatin     Essential hypertension  -     Comprehensive metabolic panel; Future  Controlled with Lisinopril and Metoprolol     Iron deficiency  -     Iron and TIBC; Future  -     Ferritin; Future  -     CBC; Future    Rosai-Ileana disease (CMS/HCC)  Stable

## 2021-11-25 LAB
ALBUMIN SERPL-MCNC: 4.3 G/DL (ref 3.8–4.9)
ALBUMIN/GLOB SERPL: 1.3 {RATIO} (ref 1.2–2.2)
ALP SERPL-CCNC: 104 IU/L (ref 44–121)
ALT SERPL-CCNC: 18 IU/L (ref 0–32)
AST SERPL-CCNC: 13 IU/L (ref 0–40)
BILIRUB SERPL-MCNC: 0.3 MG/DL (ref 0–1.2)
BUN SERPL-MCNC: 14 MG/DL (ref 6–24)
BUN/CREAT SERPL: 27 (ref 9–23)
CALCIUM SERPL-MCNC: 9.4 MG/DL (ref 8.7–10.2)
CHLORIDE SERPL-SCNC: 102 MMOL/L (ref 96–106)
CHOLEST SERPL-MCNC: 115 MG/DL (ref 100–199)
CO2 SERPL-SCNC: 25 MMOL/L (ref 20–29)
CREAT SERPL-MCNC: 0.51 MG/DL (ref 0.57–1)
ERYTHROCYTE [DISTWIDTH] IN BLOOD BY AUTOMATED COUNT: 13.6 % (ref 11.7–15.4)
FERRITIN SERPL-MCNC: 101 NG/ML (ref 15–150)
GLOBULIN SER CALC-MCNC: 3.4 G/DL (ref 1.5–4.5)
GLUCOSE SERPL-MCNC: 111 MG/DL (ref 65–99)
HBA1C MFR BLD: 6.7 % (ref 4.8–5.6)
HCT VFR BLD AUTO: 38 % (ref 34–46.6)
HDLC SERPL-MCNC: 35 MG/DL
HGB BLD-MCNC: 12.6 G/DL (ref 11.1–15.9)
IRON SATN MFR SERPL: 14 % (ref 15–55)
IRON SERPL-MCNC: 53 UG/DL (ref 27–159)
LAB CORP EGFR IF AFRICN AM: 126 ML/MIN/1.73
LAB CORP EGFR IF NONAFRICN AM: 109 ML/MIN/1.73
LDLC SERPL CALC-MCNC: 57 MG/DL (ref 0–99)
MCH RBC QN AUTO: 28.9 PG (ref 26.6–33)
MCHC RBC AUTO-ENTMCNC: 33.2 G/DL (ref 31.5–35.7)
MCV RBC AUTO: 87 FL (ref 79–97)
PLATELET # BLD AUTO: 192 X10E3/UL (ref 150–450)
POTASSIUM SERPL-SCNC: 4.7 MMOL/L (ref 3.5–5.2)
PROT SERPL-MCNC: 7.7 G/DL (ref 6–8.5)
RBC # BLD AUTO: 4.36 X10E6/UL (ref 3.77–5.28)
SODIUM SERPL-SCNC: 141 MMOL/L (ref 134–144)
TIBC SERPL-MCNC: 369 UG/DL (ref 250–450)
TRIGL SERPL-MCNC: 130 MG/DL (ref 0–149)
UIBC SERPL-MCNC: 316 UG/DL (ref 131–425)
VLDLC SERPL CALC-MCNC: 23 MG/DL (ref 5–40)
WBC # BLD AUTO: 8.5 X10E3/UL (ref 3.4–10.8)

## 2021-11-26 ENCOUNTER — TELEPHONE (OUTPATIENT)
Dept: PRIMARY CARE | Facility: CLINIC | Age: 56
End: 2021-11-26

## 2021-11-26 LAB
ALBUMIN/CREAT UR: 11 MG/G CREAT (ref 0–29)
CREAT UR-MCNC: 42.7 MG/DL
MICROALBUMIN UR-MCNC: 4.7 UG/ML

## 2021-11-26 NOTE — TELEPHONE ENCOUNTER
Discussed labs w pt- had a negative cologard at [facility], discussed consideration of a full colonoscopy in light of iron deficiency.  The iron deficiency has been long term and diagnosed following pt pregnancy.  Cont daily iron, nl ferritin and hgb but slightly low iron sat

## 2022-02-12 DIAGNOSIS — I10 ESSENTIAL HYPERTENSION: ICD-10-CM

## 2022-02-12 DIAGNOSIS — I73.9 PERIPHERAL VASCULAR DISEASE (CMS/HCC): ICD-10-CM

## 2022-02-14 RX ORDER — METOPROLOL SUCCINATE 100 MG/1
TABLET, EXTENDED RELEASE ORAL
Qty: 30 TABLET | Refills: 5 | Status: SHIPPED | OUTPATIENT
Start: 2022-02-14 | End: 2022-03-01

## 2022-02-14 RX ORDER — ATORVASTATIN CALCIUM 80 MG/1
TABLET, FILM COATED ORAL
Qty: 30 TABLET | Refills: 5 | Status: SHIPPED | OUTPATIENT
Start: 2022-02-14 | End: 2022-09-06 | Stop reason: SDUPTHER

## 2022-02-21 DIAGNOSIS — I10 ESSENTIAL HYPERTENSION: ICD-10-CM

## 2022-02-21 RX ORDER — LISINOPRIL 10 MG/1
TABLET ORAL
Qty: 30 TABLET | Refills: 5 | Status: SHIPPED | OUTPATIENT
Start: 2022-02-21 | End: 2022-03-01

## 2022-03-01 ENCOUNTER — OFFICE VISIT (OUTPATIENT)
Dept: CARDIOLOGY | Facility: CLINIC | Age: 57
End: 2022-03-01
Payer: COMMERCIAL

## 2022-03-01 VITALS
HEIGHT: 64 IN | WEIGHT: 223 LBS | BODY MASS INDEX: 38.07 KG/M2 | RESPIRATION RATE: 16 BRPM | HEART RATE: 68 BPM | DIASTOLIC BLOOD PRESSURE: 78 MMHG | SYSTOLIC BLOOD PRESSURE: 118 MMHG

## 2022-03-01 DIAGNOSIS — I73.9 PERIPHERAL VASCULAR DISEASE (CMS/HCC): ICD-10-CM

## 2022-03-01 DIAGNOSIS — I10 PRIMARY HYPERTENSION: ICD-10-CM

## 2022-03-01 DIAGNOSIS — I73.9 CLAUDICATION (CMS/HCC): ICD-10-CM

## 2022-03-01 DIAGNOSIS — I25.10 CAD IN NATIVE ARTERY: Primary | ICD-10-CM

## 2022-03-01 DIAGNOSIS — E11.9 TYPE 2 DIABETES MELLITUS WITHOUT COMPLICATION, WITHOUT LONG-TERM CURRENT USE OF INSULIN (CMS/HCC): ICD-10-CM

## 2022-03-01 DIAGNOSIS — R06.09 DYSPNEA ON EXERTION: ICD-10-CM

## 2022-03-01 DIAGNOSIS — R94.31 ABNORMAL EKG: ICD-10-CM

## 2022-03-01 DIAGNOSIS — I10 ESSENTIAL HYPERTENSION: ICD-10-CM

## 2022-03-01 PROCEDURE — 3008F BODY MASS INDEX DOCD: CPT | Performed by: INTERNAL MEDICINE

## 2022-03-01 PROCEDURE — 99214 OFFICE O/P EST MOD 30 MIN: CPT | Performed by: INTERNAL MEDICINE

## 2022-03-01 PROCEDURE — 3074F SYST BP LT 130 MM HG: CPT | Performed by: INTERNAL MEDICINE

## 2022-03-01 PROCEDURE — 3078F DIAST BP <80 MM HG: CPT | Performed by: INTERNAL MEDICINE

## 2022-03-01 PROCEDURE — 93000 ELECTROCARDIOGRAM COMPLETE: CPT | Performed by: INTERNAL MEDICINE

## 2022-03-01 RX ORDER — LISINOPRIL 10 MG/1
10 TABLET ORAL
Qty: 30 TABLET | Refills: 5
Start: 2022-03-01 | End: 2022-09-06 | Stop reason: SDUPTHER

## 2022-03-01 RX ORDER — METFORMIN HYDROCHLORIDE 750 MG/1
TABLET, EXTENDED RELEASE ORAL
Qty: 180 TABLET | Refills: 3
Start: 2022-03-01 | End: 2022-08-29

## 2022-03-01 RX ORDER — METOPROLOL SUCCINATE 100 MG/1
100 TABLET, EXTENDED RELEASE ORAL
Qty: 30 TABLET | Refills: 5
Start: 2022-03-01 | End: 2022-09-06 | Stop reason: SDUPTHER

## 2022-03-01 NOTE — LETTER
March 2, 2022     Nora Gudino DO  [address] [facility] [address]    Patient: Mirela Lopez  YOB: 1965  Date of Visit: 3/1/2022      Dear Dr. Gudino:    Thank you for referring Mirela Lopez to me for evaluation. Below are my notes for this consultation.    If you have questions, please do not hesitate to call me. I look forward to following your patient along with you.         Sincerely,        Zara Mccloud MD        CC: MD Oneyda Naik Erin, MD  3/2/2022  5:00 PM  Signed       Cardiology Office Visit     Patient ID: Mirela Lopez 56 y.o. female 1965  PCP: Nora Gudino DO   History Present Illness     Mirela Lopez returns to the office today for follow-up of her coronary artery disease with a previous percutaneous intervention and a 90% LAD in March 2021. Please recall that she also has a history of a circumflex intervention several years ago while on vacation in [place]. Mirela is diabetic, hypertensive and also has peripheral vascular disease, but states that she is doing well. She has no anginal symptoms. She denies PND or orthopnea. She denies claudication. Her EKG is unchanged. She states that she is compliant with all of her medications and will have completed 1 year of dual antiplatelet therapy at the end of March. She is also looking forward to longterm at the end of 2022.    Allergies/Medications   Allergies:Adhesive    Medications:  Current Outpatient Medications   Medication Instructions   • aspirin 81 mg, oral, Daily   • atorvastatin (LIPITOR) 80 mg tablet take 1 tablet by mouth every day   • empagliflozin (JARDIANCE) 25 mg, oral, Daily   • ferrous sulfate 134 mg (27 mg iron) tablet 1 tablet, oral, Daily with breakfast   • glipiZIDE (GLUCOTROL XL) 5 mg, oral, Daily   • lisinopriL (PRINIVIL) 10 mg, oral, Daily (6a)   • metFORMIN XR (GLUCOPHAGE-XR) 750 mg 24 hr tablet Two daily at dinner   • metoprolol succinate XL (TOPROL-XL) 100 mg,  "oral, Daily (6a)   • nitroglycerin (NITROSTAT) 0.4 mg, sublingual, Every 5 min PRN   • ticagrelor (BRILINTA) 90 mg, oral, 2 times daily      Physical Exam     Vitals:    03/01/22 1006   BP: 118/78   BP Location: Left upper arm   Patient Position: Sitting   Pulse: 68   Resp: 16   Weight: 101 kg (223 lb)   Height: 1.626 m (5' 4\")     BP Readings from Last 3 Encounters:   03/01/22 118/78   10/11/21 120/80   08/03/21 124/70     Wt Readings from Last 3 Encounters:   03/01/22 101 kg (223 lb)   10/11/21 99.8 kg (220 lb)   08/03/21 104 kg (230 lb)      Physical Exam:  Constitutional: Overweight white female.  No apparent distress  Eyes: No icterus  ENT:  Mucosa moist  Neck: Supple, no JVD or hepatojugular reflux.  No bruits.  Cardiac: Normal S1 and S2, regular rate and rhythm, no S3.  No rub.  No ectopy. There is a 1/6 systolic murmur at the left sternal border.  Lungs: Clear to auscultation bilaterally.  No wheezing.  Abdomen: Soft, nontender, positive normoactive bowel sounds.  No bruit.  Extremities: No clubbing or cyanosis.  No calf tenderness.  No edema.  Distal pulses are intact.  Skin: Warm and dry.  No jaundice.  Musculoskeletal: No joint swelling or erythema.  Neurologic: Awake, alert, oriented.  Moving all extremities.  Psychiatric: No agitation.    Labs/Imaging/Procedures   Labs  Lab Results   Component Value Date    WBC 8.5 11/24/2021    HGB 12.6 11/24/2021     11/24/2021    ALT 18 11/24/2021    AST 13 11/24/2021     11/24/2021    K 4.7 11/24/2021     11/24/2021    CREATININE 0.51 (L) 11/24/2021    BUN 14 11/24/2021    CO2 25 11/24/2021    TSH 2.420 06/15/2021    INR 1.0 03/25/2021    HGBA1C 6.7 (H) 11/24/2021     Lab Results   Component Value Date    CHOL 115 11/24/2021    CHOL 105 06/15/2021    HDL 35 (L) 11/24/2021    HDL 31 (L) 06/15/2021     Lab Results   Component Value Date    LDLCALC 57 11/24/2021    LDLCALC 50 06/15/2021     Imaging  PVR lower extremity 6/23/2021: Normal ABIs " bilaterally at rest.  History of kissing iliac stents.       Cardiac catheterization 3/30/2021: Discrete 90% stenosis in the mid LAD followed by tubular 70% stenosis.  Prior left circumflex stents patent.  Patient had successful NERI of LAD lesions.       Echo 3/10/2021:  Technically difficult study secondary to body habitus. Overall normal LV size with mild concentric LVH.  LVEF 65-70%.  No obvious regional wall motion abnormalities but endocardium difficult to visualize.  Impaired relaxation. The RV  is difficult to visualize in subcostal views are of poor quality.  The RV is grossly normal in size and function in the apical views. · Trileaflet aortic valve.  No aortic stenosis.  Sclerotic mitral valve leaflets with trace MR.  No significant pericardial effusion.     Exercise MPI 3/10/2021:  Abnormal exercise nuclear stress test.  Reversible defects are noted in the lateral wall, apex, and anterior wall suggestive of ischemia.  No prior study for comparison.  LVEF 67%.  No transient ischemic dilatation.  EKG portion is negative for arrhythmias or ischemic changes.  Patient exercised on Abner protocol for 3 minutes 4.6 METS.  Poor exercise tolerance.  No chest pain reported.  Peak blood pressure 144/88 mmHg.  Patient achieved 92% of predicted maximal heart rate.     LE arterial duplex bilateral 7/22/2020: Triphasic waveforms throughout the lower extremity with at least two-vessel runoff to the foot.  The left lower extremity has triphasic waveforms with at least two-vessel runoff.  There is a probable stenosis at the origin of the profunda.  No significant change.       LE arterial duplex bilateral 7/17/2019: Triphasic and biphasic waveforms throughout the right and left lower extremities.  No evidence of discrete stenosis or obstruction.       Exercise stress echocardiogram 4/19/2019: Very technically difficult study.  Exercise EKG negative for ischemia or arrhythmia. Poor exercise capacity completing only stage I  "Abner protocol.  Exercise EKG negative for ischemia or arrhythmia at that workload.  At rest, normal LV size and function with LVEF 60-65%.  Basal mid inferior hypokinesis.  Mildly dilated RV with preserved function.  Trace TR.  Trace MR.  Trileaflet aortic valve.  No significant stenosis or insufficiency.  Prominent pericardial fat pad.  Immediately following exercise overall LV function improves.  Persistent basal inferior hypokinesis consistent with scar. No ischemia.       PVR 1/9/2019: Right JOURDAN 1.12 at rest and 1.1 following exercise.  Left JOURDAN 0.95 at rest and 0.96 following exercise.  Normal segmental pressures and PVRs.       Periph Intervention 10/2017: Successful right iliac stent.     Abd Ao Duplex 9/2017: Elevated velocities right MICHAEL with monophasic flow.     Echo 9/2017: LVEF 65%, Top normal LV size with overall preserved function. No significant pulmonary hypertension by Doppler. Technically difficult study.     Cath 8/13/17: LAD tortuous, no significant disease. RCA dominant and patent. Left circumflex with 90% proximal lesion, 95% mid lesion and distal thrombus. Successful NERI ×2 left circumflex. Occluded right MICHAEL at origin with collaterals. Patent left MICHAEL and CFA       EKG  Assessment/Plan     1. CAD in native artery  Mirela is doing very well and should continue on dual antiplatelet therapy for another month. At the conclusion of that she says that she \"has some procedures to get done\" and when this is complete, I asked her to contact me so that we can initiate low-dose rivaroxaban 2.5 mg twice daily in addition to her 81 mg aspirin. She is already on Jardiance, beta-blocker and statin therapy. LDL is at goal.    2. Primary hypertension  Blood pressure is controlled and she has started losing weight. She is hopeful that during senior living she can focus more on lifestyle changes. Continue lisinopril and metoprolol.    3. Peripheral vascular disease (CMS/HCC)  Stable with no new claudication " symptoms. Discussed the addition of low-dose rivaroxaban when she is completed dual antiplatelet therapy.    4. Abnormal EKG  No acute EKG changes.    5. Type 2 diabetes mellitus without complication, without long-term current use of insulin (CMS/HCC)  Continue SGLT2 inhibitor along with Metformin and glipizide.  - metFORMIN XR (GLUCOPHAGE-XR) 750 mg 24 hr tablet; Two daily at dinner  Dispense: 180 tablet; Refill: 3    6. Claudication (CMS/HCC)  No claudication at the present time. Continue current therapy.    7. Dyspnea on exertion  Dyspnea on exertion has improved. No evidence of volume overload.      Return in about 6 months (around 9/1/2022) for follow-up, earlier if any change in symptoms.  Available medical records reviewed and updated including laboratory data, imaging studies, previous outpatient and inpatient records.  Counseling/education performed. Care everywhere utilized where appropriate.    Thank you for allowing me to participate in the care of this patient.  I hope this information is helpful.      Zara Mccloud MD [facility], [facility]  3/2/2022  4:56 PM  This document was generated utilizing voice recognition technology. Please excuse any typographical errors which may be present.

## 2022-03-02 NOTE — PROGRESS NOTES
"     Cardiology Office Visit     Patient ID: Mirela Lopez 56 y.o. female 1965  PCP: Nora Gudino DO   History Present Illness     Mirela Lopez returns to the office today for follow-up of her coronary artery disease with a previous percutaneous intervention and a 90% LAD in March 2021. Please recall that she also has a history of a circumflex intervention several years ago while on vacation in [place]. Mirela is diabetic, hypertensive and also has peripheral vascular disease, but states that she is doing well. She has no anginal symptoms. She denies PND or orthopnea. She denies claudication. Her EKG is unchanged. She states that she is compliant with all of her medications and will have completed 1 year of dual antiplatelet therapy at the end of March. She is also looking forward to [facility] at the end of 2022.    Allergies/Medications   Allergies:Adhesive    Medications:  Current Outpatient Medications   Medication Instructions   • aspirin 81 mg, oral, Daily   • atorvastatin (LIPITOR) 80 mg tablet take 1 tablet by mouth every day   • empagliflozin (JARDIANCE) 25 mg, oral, Daily   • ferrous sulfate 134 mg (27 mg iron) tablet 1 tablet, oral, Daily with breakfast   • glipiZIDE (GLUCOTROL XL) 5 mg, oral, Daily   • lisinopriL (PRINIVIL) 10 mg, oral, Daily (6a)   • metFORMIN XR (GLUCOPHAGE-XR) 750 mg 24 hr tablet Two daily at dinner   • metoprolol succinate XL (TOPROL-XL) 100 mg, oral, Daily (6a)   • nitroglycerin (NITROSTAT) 0.4 mg, sublingual, Every 5 min PRN   • ticagrelor (BRILINTA) 90 mg, oral, 2 times daily      Physical Exam     Vitals:    03/01/22 1006   BP: 118/78   BP Location: Left upper arm   Patient Position: Sitting   Pulse: 68   Resp: 16   Weight: 101 kg (223 lb)   Height: 1.626 m (5' 4\")     BP Readings from Last 3 Encounters:   03/01/22 118/78   10/11/21 120/80   08/03/21 124/70     Wt Readings from Last 3 Encounters:   03/01/22 101 kg (223 lb)   10/11/21 99.8 kg (220 lb)   08/03/21 104 kg " (230 lb)      Physical Exam:  Constitutional: Overweight white female.  No apparent distress  Eyes: No icterus  ENT:  Mucosa moist  Neck: Supple, no JVD or hepatojugular reflux.  No bruits.  Cardiac: Normal S1 and S2, regular rate and rhythm, no S3.  No rub.  No ectopy. There is a 1/6 systolic murmur at the left sternal border.  Lungs: Clear to auscultation bilaterally.  No wheezing.  Abdomen: Soft, nontender, positive normoactive bowel sounds.  No bruit.  Extremities: No clubbing or cyanosis.  No calf tenderness.  No edema.  Distal pulses are intact.  Skin: Warm and dry.  No jaundice.  Musculoskeletal: No joint swelling or erythema.  Neurologic: Awake, alert, oriented.  Moving all extremities.  Psychiatric: No agitation.    Labs/Imaging/Procedures   Labs  Lab Results   Component Value Date    WBC 8.5 11/24/2021    HGB 12.6 11/24/2021     11/24/2021    ALT 18 11/24/2021    AST 13 11/24/2021     11/24/2021    K 4.7 11/24/2021     11/24/2021    CREATININE 0.51 (L) 11/24/2021    BUN 14 11/24/2021    CO2 25 11/24/2021    TSH 2.420 06/15/2021    INR 1.0 03/25/2021    HGBA1C 6.7 (H) 11/24/2021     Lab Results   Component Value Date    CHOL 115 11/24/2021    CHOL 105 06/15/2021    HDL 35 (L) 11/24/2021    HDL 31 (L) 06/15/2021     Lab Results   Component Value Date    LDLCALC 57 11/24/2021    LDLCALC 50 06/15/2021     Imaging  PVR lower extremity 6/23/2021: Normal ABIs bilaterally at rest.  History of kissing iliac stents.       Cardiac catheterization 3/30/2021: Discrete 90% stenosis in the mid LAD followed by tubular 70% stenosis.  Prior left circumflex stents patent.  Patient had successful NERI of LAD lesions.       Echo 3/10/2021:  Technically difficult study secondary to body habitus. Overall normal LV size with mild concentric LVH.  LVEF 65-70%.  No obvious regional wall motion abnormalities but endocardium difficult to visualize.  Impaired relaxation. The RV  is difficult to visualize in subcostal  views are of poor quality.  The RV is grossly normal in size and function in the apical views. · Trileaflet aortic valve.  No aortic stenosis.  Sclerotic mitral valve leaflets with trace MR.  No significant pericardial effusion.     Exercise MPI 3/10/2021:  Abnormal exercise nuclear stress test.  Reversible defects are noted in the lateral wall, apex, and anterior wall suggestive of ischemia.  No prior study for comparison.  LVEF 67%.  No transient ischemic dilatation.  EKG portion is negative for arrhythmias or ischemic changes.  Patient exercised on Abner protocol for 3 minutes 4.6 METS.  Poor exercise tolerance.  No chest pain reported.  Peak blood pressure 144/88 mmHg.  Patient achieved 92% of predicted maximal heart rate.     LE arterial duplex bilateral 7/22/2020: Triphasic waveforms throughout the lower extremity with at least two-vessel runoff to the foot.  The left lower extremity has triphasic waveforms with at least two-vessel runoff.  There is a probable stenosis at the origin of the profunda.  No significant change.       LE arterial duplex bilateral 7/17/2019: Triphasic and biphasic waveforms throughout the right and left lower extremities.  No evidence of discrete stenosis or obstruction.       Exercise stress echocardiogram 4/19/2019: Very technically difficult study.  Exercise EKG negative for ischemia or arrhythmia. Poor exercise capacity completing only stage I Abner protocol.  Exercise EKG negative for ischemia or arrhythmia at that workload.  At rest, normal LV size and function with LVEF 60-65%.  Basal mid inferior hypokinesis.  Mildly dilated RV with preserved function.  Trace TR.  Trace MR.  Trileaflet aortic valve.  No significant stenosis or insufficiency.  Prominent pericardial fat pad.  Immediately following exercise overall LV function improves.  Persistent basal inferior hypokinesis consistent with scar. No ischemia.       PVR 1/9/2019: Right JOURDAN 1.12 at rest and 1.1 following exercise.  " Left JOURDAN 0.95 at rest and 0.96 following exercise.  Normal segmental pressures and PVRs.       Periph Intervention 10/2017: Successful right iliac stent.     Abd Ao Duplex 9/2017: Elevated velocities right MICHAEL with monophasic flow.     Echo 9/2017: LVEF 65%, Top normal LV size with overall preserved function. No significant pulmonary hypertension by Doppler. Technically difficult study.     Cath 8/13/17: LAD tortuous, no significant disease. RCA dominant and patent. Left circumflex with 90% proximal lesion, 95% mid lesion and distal thrombus. Successful NERI ×2 left circumflex. Occluded right MICHAEL at origin with collaterals. Patent left MICHAEL and CFA       EKG  Assessment/Plan     1. CAD in native artery  Mirela is doing very well and should continue on dual antiplatelet therapy for another month. At the conclusion of that she says that she \"has some procedures to get done\" and when this is complete, I asked her to contact me so that we can initiate low-dose rivaroxaban 2.5 mg twice daily in addition to her 81 mg aspirin. She is already on Jardiance, beta-blocker and statin therapy. LDL is at goal.    2. Primary hypertension  Blood pressure is controlled and she has started losing weight. She is hopeful that during [facility] she can focus more on lifestyle changes. Continue lisinopril and metoprolol.    3. Peripheral vascular disease (CMS/HCC)  Stable with no new claudication symptoms. Discussed the addition of low-dose rivaroxaban when she is completed dual antiplatelet therapy.    4. Abnormal EKG  No acute EKG changes.    5. Type 2 diabetes mellitus without complication, without long-term current use of insulin (CMS/HCC)  Continue SGLT2 inhibitor along with Metformin and glipizide.  - metFORMIN XR (GLUCOPHAGE-XR) 750 mg 24 hr tablet; Two daily at dinner  Dispense: 180 tablet; Refill: 3    6. Claudication (CMS/HCC)  No claudication at the present time. Continue current therapy.    7. Dyspnea on exertion  Dyspnea on " exertion has improved. No evidence of volume overload.      Return in about 6 months (around 9/1/2022) for follow-up, earlier if any change in symptoms.  Available medical records reviewed and updated including laboratory data, imaging studies, previous outpatient and inpatient records.  Counseling/education performed. Care everywhere utilized where appropriate.    Thank you for allowing me to participate in the care of this patient.  I hope this information is helpful.      Zara Mccloud MD [facility]  3/2/2022  4:56 PM  This document was generated utilizing voice recognition technology. Please excuse any typographical errors which may be present.

## 2022-03-21 ENCOUNTER — TELEPHONE (OUTPATIENT)
Dept: SCHEDULING | Facility: CLINIC | Age: 57
End: 2022-03-21
Payer: COMMERCIAL

## 2022-03-21 NOTE — TELEPHONE ENCOUNTER
I spoke to Mirela, per her request I faxed her forms to [employer]: [employer] at [phone]  And mailed originals to her house. THX

## 2022-03-21 NOTE — TELEPHONE ENCOUNTER
Pt called stating she has [place] papers at  and would like them faxed      Pt can be reached at  [phone]

## 2022-04-14 ENCOUNTER — TELEMEDICINE (OUTPATIENT)
Dept: PRIMARY CARE | Facility: CLINIC | Age: 57
End: 2022-04-14
Payer: COMMERCIAL

## 2022-04-14 DIAGNOSIS — E11.9 TYPE 2 DIABETES MELLITUS WITHOUT COMPLICATION, WITHOUT LONG-TERM CURRENT USE OF INSULIN (CMS/HCC): Primary | ICD-10-CM

## 2022-04-14 DIAGNOSIS — E78.00 PURE HYPERCHOLESTEROLEMIA: ICD-10-CM

## 2022-04-14 DIAGNOSIS — L29.9 ITCHING OF EAR: ICD-10-CM

## 2022-04-14 DIAGNOSIS — I10 ESSENTIAL HYPERTENSION: ICD-10-CM

## 2022-04-14 PROCEDURE — 99212 OFFICE O/P EST SF 10 MIN: CPT | Mod: 95 | Performed by: INTERNAL MEDICINE

## 2022-04-14 RX ORDER — LANCETS 21 GAUGE
1 EACH MISCELLANEOUS DAILY
Qty: 100 EACH | Refills: 6 | Status: SHIPPED | OUTPATIENT
Start: 2022-04-14 | End: 2024-12-23

## 2022-04-14 RX ORDER — INSULIN PUMP SYRINGE, 3 ML
EACH MISCELLANEOUS
Qty: 1 EACH | Refills: 0 | Status: SHIPPED | OUTPATIENT
Start: 2022-04-14 | End: 2023-04-14

## 2022-04-14 NOTE — PROGRESS NOTES
Verification of Patient Location:  The patient affirms they are currently located in the following state: [place]    Request for Consent:    Audio and Video Encounter   Negrita, my name is Nora KITCHENAbhijeet Gudino DO.  Before we proceed, can you please verify your identification by telling me your full name and date of birth?  Can you tell me who is in the room with you?    You and I are about to have a telemedicine check-in or visit because you have requested it.  This is a live video-conference.  I am a real person, speaking to you in real time.  There is no one else with me on the video-conference.  However, when we use ([employer], [employer], etc) it is important for you to know that the video-conference may not be secure or private.  I am not recording this conversation and I am asking you not to record it.  This telemedicine visit will be billed to your health insurance or you, if you are self-insured.  You understand you will be responsible for any copayments or coinsurances that apply to your telemedicine visit.  Communication platform used for this encounter:  [employer] Video Visit (with Zoom integration)     Before starting our telemedicine visit, I am required to get your consent for this virtual check-in or visit by telemedicine. Do you consent?      Patient Response to Request for Consent:  Yes      Visit Documentation:  Subjective     Patient ID: Mirela Lopez is a 56 y.o. female.  1965      Mirela has been doing good with her eating and doing well.  Does not have a meter.  Last labs were end of November.  She is due for her Ophthalmology visit and mammogram. She had seen Dr DAMASO Heller and BP controlled no med changes.     She has had itching possibly cerumen impaction in the right ear.  Would like to see ENT. No discharge or hearing loss.       The following have been reviewed and updated as appropriate in this visit:   Meds  Problems         Review of Systems   HENT:        Itching r ear           Assessment/Plan   Diagnoses and all orders for this visit:    Type 2 diabetes mellitus without complication, without long-term current use of insulin (CMS/[facility]) (Primary)  -     Comprehensive metabolic panel; Future  -     Hemoglobin A1c; Future  -     lancets 21 gauge misc; 1 lancet daily.  -     blood-glucose meter kit; Use as instructed  -     blood sugar diagnostic strip; Use on edaily  Cont Glucotrol Jardiance and Metformin   Get eye exam    Pure hypercholesterolemia  -     Comprehensive metabolic panel; Future  -     Lipid panel; Future  Cont Atorvastatin     Essential hypertension  -     Comprehensive metabolic panel; Future  Cont Lisinopril   Itching of ear  -     Ambulatory referral to ENT; Future        Time Spent:  I spent 13 minutes on this date of service performing the following activities: obtaining history.

## 2022-06-22 ENCOUNTER — OFFICE VISIT (OUTPATIENT)
Dept: VASCULAR SURGERY | Facility: CLINIC | Age: 57
End: 2022-06-22
Payer: COMMERCIAL

## 2022-06-22 ENCOUNTER — HOSPITAL ENCOUNTER (OUTPATIENT)
Dept: CARDIOLOGY | Facility: CLINIC | Age: 57
Discharge: HOME | End: 2022-06-22
Attending: SURGERY
Payer: COMMERCIAL

## 2022-06-22 VITALS — BODY MASS INDEX: 38.07 KG/M2 | HEIGHT: 64 IN | WEIGHT: 223 LBS

## 2022-06-22 DIAGNOSIS — I73.9 PERIPHERAL VASCULAR DISEASE (CMS/HCC): ICD-10-CM

## 2022-06-22 DIAGNOSIS — I73.9 CLAUDICATION (CMS/HCC): Primary | ICD-10-CM

## 2022-06-22 PROCEDURE — 93923 UPR/LXTR ART STDY 3+ LVLS: CPT | Performed by: SURGERY

## 2022-06-22 PROCEDURE — 99214 OFFICE O/P EST MOD 30 MIN: CPT | Performed by: SURGERY

## 2022-06-22 NOTE — PROGRESS NOTES
[facility]  [facility]   [address]  [place], [address]  (P)[phone] (F) [phone]       DETAILS OF VISIT   Visit Date: 2022  Claudication (Review PVR)      Mirela Lopez presents to the office today for continued management of her aortoiliac occlusive disease.  She is a 57-year-old female with history of bilateral iliac stenting who presents for routine follow-up and review of her noninvasive studies.  Since her last visit here she has not had any significant changes to her overall health.  She is well walking is not limited by any discomforts, and she has no rest pain or ulcerations at this time.  Overall she is quite happy.    MEDICATIONS     •  aspirin  •  atorvastatin  •  blood sugar diagnostic  •  blood-glucose meter  •  ferrous sulfate  •  glipiZIDE  •  JARDIANCE  •  lancets  •  lisinopriL  •  metFORMIN XR  •  metoprolol succinate XL  •  nitroglycerin  •  ticagrelor    PAST MEDICAL AND SURGICAL HISTORY     Past Medical History:   Diagnosis Date   • GERD (gastroesophageal reflux disease)    • Hyperlipidemia    • Peripheral vascular disease (CMS/HCC) 2017    Right iliac claudication.  Aortogram demonstrated near occlusion of the right common iliac.  Successful right iliac stent 2017     Past Surgical History:   Procedure Laterality Date   • CARDIAC CATHETERIZATION      LAD tortuous, no significant disease.  Dominant RCA-patent.  90% proximal cx, 95% mid cx and distal thrombus   • CARPAL TUNNEL RELEASE     •  SECTION     • CORONARY STENT PLACEMENT  2017    NERI ×2 left circumflex-performed in [place]   • ILIAC ARTERY STENT  10/2017    Dr. Ugalde     Family History   Problem Relation Age of Onset   • Lung cancer Biological Mother    • Heart disease Biological Father    • Hyperlipidemia Biological Father    • Diabetes Biological Father    • Diabetes type II Maternal Grandmother        ALLERGIES     Adhesive    SOCIAL/TOBACCO  HISTORY     Social History     Socioeconomic History   • Marital status:    Tobacco Use   • Smoking status: Former Smoker     Quit date: 2017     Years since quittin.8   • Smokeless tobacco: Never Used   Vaping Use   • Vaping Use: Never used   Substance and Sexual Activity   • Alcohol use: Yes     Comment: Occasional social alcohol   • Drug use: No   • Sexual activity: Defer   Social History Narrative     one child     She is a supervisor for adult probation      Social History     Tobacco Use   Smoking Status Former Smoker   • Quit date: 2017   • Years since quittin.8   Smokeless Tobacco Never Used       REVIEW OF SYSTEMS     Constitutional: No fever, no chills, and no recent weight change. No headache.  HEENT: No neck pain, no neck stiffness, and no lump or swelling in the neck. no hoarseness, no dysphagia.   Cardiovascular: No chest pain or discomfort, no palpitations.  Respiratory: No wheezing. No shortness of breath, no cough, no dyspnea.  Gastrointestinal: Appetite without significant change. No dysphagia, no active abdominal pain, and no melena. No bright red blood per rectum.  Genitourinary: No hematuria, No genital lesion.  No obvious bladder changes.   Endocrine: No polydipsia and no excessive sweating.  Hematologic: No easy bleeding and no tendency for easy bruising.   Integumentary: No obvious masses, No pruritus. No skin lesions and no rash  Musculoskeletal: No new muscle tenderness.  Neurological: No new motor disturbances, or sensory disturbances.   Psychological: Appropriate.    PHYSICAL EXAM     Vitals: There were no vitals taken for this visit.    Physical Exam   Vitals reviewed.   Constitutional:       Appearance: She is well-developed.   HENT:      Head: Normocephalic and atraumatic.   Eyes:      Pupils: Pupils are equal, round, and reactive to light.   Neck:      Trachea: No tracheal deviation.   Cardiovascular:      Rate and Rhythm: Normal rate.   Pulmonary:       Effort: Pulmonary effort is normal.      Breath sounds: Normal breath sounds.   Abdominal:      Palpations: Abdomen is soft. There is no mass.   Musculoskeletal:         General: Normal range of motion.      Cervical back: Normal range of motion and neck supple.   Skin:     General: Skin is warm and dry.   Neurological:      Mental Status: She is alert and oriented to person, place, and time.   IMAGING     PVR was reviewed 1.1 on the right 0.98 on the left excellent biphasic waveforms at the ankle bilaterally    ASSESSMENT/PLAN     Problem List Items Addressed This Visit        Circulatory    Peripheral vascular disease (CMS/HCC)    Overview     PAD status post iliac stenting in 2017.  Since that time has been doing well asymptomatic from a peripheral arterial standpoint.           Current Assessment & Plan     57-year-old female with history of iliac stenting in the past who has no significant claudication-like symptoms.  Recommend follow-up in 1 year with a repeat PVR.  Given her vascular disease she likely should have carotid duplex performed in the not-too-distant future.             Other Visit Diagnoses     Claudication (CMS/[facility])    -  Primary    Relevant Orders    Ultrasound PVR lower extremity            DANA Ayala  6/22/2022    Thank you very much for allowing us to participate in the care of your patient.  I will keep you informed ofher care.  Please not hesitate to call or email if there are any questions.      This document was generated utilizing voice recognition technology. An attempt at proofreading has been made to minimize errors but topographical errors may be present.

## 2022-06-22 NOTE — ASSESSMENT & PLAN NOTE
57-year-old female with history of iliac stenting in the past who has no significant claudication-like symptoms.  Recommend follow-up in 1 year with a repeat PVR.  Given her vascular disease she likely should have carotid duplex performed in the not-too-distant future.

## 2022-06-23 LAB
BSA FOR ECHO PROCEDURE: 2.14 M2
LEFT 1ST TOE INDEX: 0.81
LEFT 1ST TOE: 108 MMHG
LEFT ABI: 0.9
LEFT ARM BP: 123 MMHG
LEFT DORSALIS PEDIS INDEX: 0.9
LEFT DORSALIS PEDIS: 121 MMHG
LEFT LOW THIGH INDEX: 1.01
LEFT LOW THIGH: 136 MMHG
LEFT POST TIBIAL INDEX: 0.89
LEFT POSTERIOR TIBIAL: 119 MMHG
LEFT PROXIMAL CALF INDEX: 0.99
LEFT PROXIMAL CALF: 133 MMHG
LEFT TBI: 0.81
RIGHT 1ST TOE INDEX: 0.66
RIGHT 1ST TOE: 88 MMHG
RIGHT ABI: 1.13
RIGHT ARM BP: 134 MMHG
RIGHT DORSALIS PEDIS INDEX: 0.97
RIGHT DORSALIS PEDIS: 130 MMHG
RIGHT LOW THIGH INDEX: 1.23
RIGHT LOW THIGH: 165 MMHG
RIGHT POST TIBIAL INDEX: 1.13
RIGHT POSTERIOR TIBIAL: 152 MMHG
RIGHT PROXIMAL CALF INDEX: 1.2
RIGHT PROXIMAL CALF: 161 MMHG
RIGHT TBI: 0.66

## 2022-08-29 DIAGNOSIS — E11.9 TYPE 2 DIABETES MELLITUS WITHOUT COMPLICATION, WITHOUT LONG-TERM CURRENT USE OF INSULIN (CMS/HCC): ICD-10-CM

## 2022-08-29 RX ORDER — METFORMIN HYDROCHLORIDE 750 MG/1
TABLET, EXTENDED RELEASE ORAL
Qty: 180 TABLET | Refills: 3 | Status: SHIPPED | OUTPATIENT
Start: 2022-08-29 | End: 2022-09-06

## 2022-09-06 ENCOUNTER — DOCUMENTATION (OUTPATIENT)
Dept: CARDIOLOGY | Facility: CLINIC | Age: 57
End: 2022-09-06

## 2022-09-06 ENCOUNTER — OFFICE VISIT (OUTPATIENT)
Dept: CARDIOLOGY | Facility: CLINIC | Age: 57
End: 2022-09-06
Payer: COMMERCIAL

## 2022-09-06 VITALS
WEIGHT: 232.2 LBS | HEART RATE: 73 BPM | BODY MASS INDEX: 39.64 KG/M2 | SYSTOLIC BLOOD PRESSURE: 120 MMHG | HEIGHT: 64 IN | RESPIRATION RATE: 16 BRPM | DIASTOLIC BLOOD PRESSURE: 70 MMHG

## 2022-09-06 DIAGNOSIS — R06.09 DYSPNEA ON EXERTION: ICD-10-CM

## 2022-09-06 DIAGNOSIS — I25.10 CAD IN NATIVE ARTERY: Primary | ICD-10-CM

## 2022-09-06 DIAGNOSIS — I73.9 CLAUDICATION (CMS/HCC): ICD-10-CM

## 2022-09-06 DIAGNOSIS — I73.9 PERIPHERAL VASCULAR DISEASE (CMS/HCC): ICD-10-CM

## 2022-09-06 DIAGNOSIS — E11.9 TYPE 2 DIABETES MELLITUS WITHOUT COMPLICATION, WITHOUT LONG-TERM CURRENT USE OF INSULIN (CMS/HCC): ICD-10-CM

## 2022-09-06 DIAGNOSIS — R94.31 ABNORMAL EKG: ICD-10-CM

## 2022-09-06 DIAGNOSIS — I10 ESSENTIAL HYPERTENSION: ICD-10-CM

## 2022-09-06 DIAGNOSIS — I10 PRIMARY HYPERTENSION: ICD-10-CM

## 2022-09-06 PROCEDURE — 93000 ELECTROCARDIOGRAM COMPLETE: CPT | Performed by: INTERNAL MEDICINE

## 2022-09-06 PROCEDURE — 3078F DIAST BP <80 MM HG: CPT | Performed by: INTERNAL MEDICINE

## 2022-09-06 PROCEDURE — 3008F BODY MASS INDEX DOCD: CPT | Performed by: INTERNAL MEDICINE

## 2022-09-06 PROCEDURE — 3074F SYST BP LT 130 MM HG: CPT | Performed by: INTERNAL MEDICINE

## 2022-09-06 PROCEDURE — 99214 OFFICE O/P EST MOD 30 MIN: CPT | Performed by: INTERNAL MEDICINE

## 2022-09-06 RX ORDER — ATORVASTATIN CALCIUM 80 MG/1
80 TABLET, FILM COATED ORAL
Qty: 90 TABLET | Refills: 3 | Status: SHIPPED | OUTPATIENT
Start: 2022-09-06 | End: 2023-11-13 | Stop reason: SDUPTHER

## 2022-09-06 RX ORDER — LISINOPRIL 10 MG/1
10 TABLET ORAL
Qty: 90 TABLET | Refills: 3 | Status: SHIPPED | OUTPATIENT
Start: 2022-09-06 | End: 2023-11-13 | Stop reason: SDUPTHER

## 2022-09-06 RX ORDER — METFORMIN HYDROCHLORIDE 750 MG/1
750 TABLET, EXTENDED RELEASE ORAL
Qty: 180 TABLET | Refills: 3 | COMMUNITY
Start: 2022-09-06 | End: 2023-01-25 | Stop reason: SDUPTHER

## 2022-09-06 RX ORDER — METOPROLOL SUCCINATE 100 MG/1
100 TABLET, EXTENDED RELEASE ORAL
Qty: 90 TABLET | Refills: 3 | Status: SHIPPED | OUTPATIENT
Start: 2022-09-06 | End: 2023-11-13 | Stop reason: SDUPTHER

## 2022-09-06 NOTE — LETTER
September 13, 2022     Nora Gudino DO  [address] [facility] [address]    Patient: Mirela Lopez  YOB: 1965  Date of Visit: 9/6/2022      Dear Dr. Gudino:    Thank you for referring Mirela Lopez to me for evaluation. Below are my notes for this consultation.    If you have questions, please do not hesitate to call me. I look forward to following your patient along with you.         Sincerely,        Zara Mccloud MD        CC: MD Oneyda Naik Erin, MD  9/13/2022  6:08 PM  Signed       Cardiology Office Visit     Patient ID: Mirela Lopez 57 y.o. female 1965  PCP: Nora Gudino DO   History Present Illness     Mirela Lopez presents to the office today for follow-up of her coronary artery disease with previous percutaneous intervention on a 90% LAD lesion in March 2021.  Prior to that she had circumflex intervention while on vacation in [place] several years ago.  She is feeling well and denies any anginal symptoms.  Mirela is looking forward to her upcoming prison.  She has completed dual antiplatelet therapy and denies any bleeding.  She had vascular studies which we reviewed and these appear to be stable.  She denies any increasing claudication.    Allergies/Medications   Allergies:Adhesive    Medications:    aspirin, Take 81 mg by mouth daily.    atorvastatin, Take 1 tablet (80 mg total) by mouth once daily.    blood sugar diagnostic, Use on edXtremIO    blood-glucose meter, Use as instructed    ferrous sulfate, Take 1 tablet by mouth daily with breakfast.      glipiZIDE, Take 1 tablet (5 mg total) by mouth daily.    JARDIANCE, take 1 tablet by mouth every day    lancets, 1 lancet daily.    lisinopriL, Take 1 tablet (10 mg total) by mouth once daily.    metFORMIN XR, Take 750 mg by mouth daily with dinner.     metoprolol succinate XL, Take 1 tablet (100 mg total) by mouth once daily.    nitroglycerin, Place 1 tablet (0.4 mg total)  "under the tongue every 5 (five) minutes as needed for chest pain.    rivaroxaban, Take 1 tablet (2.5 mg total) by mouth 2 (two) times a day.   Physical Exam     Vitals:    09/06/22 1118   BP: 120/70   BP Location: Left upper arm   Patient Position: Sitting   Pulse: 73   Resp: 16   Weight: 105 kg (232 lb 3.2 oz)   Height: 1.626 m (5' 4\")     BP Readings from Last 3 Encounters:   09/06/22 120/70   03/01/22 118/78   10/11/21 120/80     Wt Readings from Last 3 Encounters:   09/06/22 105 kg (232 lb 3.2 oz)   06/22/22 101 kg (223 lb)   03/01/22 101 kg (223 lb)      Physical Exam:  Constitutional: Obese white female.  No apparent distress  Eyes: No icterus  ENT:  Mucosa moist  Neck: Supple, no JVD or hepatojugular reflux.  No bruits.  Cardiac: Normal S1 and S2, regular rate and rhythm, no S3.  No rub.  No ectopy.  There is a 1/6 systolic murmur at the left sternal border.  Lungs: Clear to auscultation bilaterally.  No wheezing.  Abdomen: Soft, nontender, positive normoactive bowel sounds.  No bruit.  Extremities: No clubbing or cyanosis.  No calf tenderness.  No edema.  Distal pulses are intact.  Skin: Warm and dry.  No jaundice.  Musculoskeletal: No joint swelling or erythema.  Neurologic: Awake, alert, oriented.  Moving all extremities.  Psychiatric: No agitation.    Labs/Imaging/Procedures   Labs  Lab Results   Component Value Date    WBC 8.5 11/24/2021    HGB 12.6 11/24/2021     11/24/2021    ALT 18 11/24/2021    AST 13 11/24/2021     11/24/2021    K 4.7 11/24/2021     11/24/2021    CREATININE 0.51 (L) 11/24/2021    BUN 14 11/24/2021    CO2 25 11/24/2021    TSH 2.420 06/15/2021    INR 1.0 03/25/2021    HGBA1C 6.7 (H) 11/24/2021     Lab Results   Component Value Date    CHOL 115 11/24/2021    CHOL 105 06/15/2021    HDL 35 (L) 11/24/2021    HDL 31 (L) 06/15/2021     Lab Results   Component Value Date    LDLCALC 57 11/24/2021    LDLCALC 50 06/15/2021     Imaging  PVR 6/22/22: Resting JOURDAN within normal " range-1.13 on the right and 0.9 on the left.  PVR is normal on the left and nondiagnostic on the right.    PVR lower extremity 6/23/2021: Normal ABIs bilaterally at rest.  History of kissing iliac stents.       Cardiac catheterization 3/30/2021: Discrete 90% stenosis in the mid LAD followed by tubular 70% stenosis.  Prior left circumflex stents patent.  Patient had successful NERI of LAD lesions.       Echo 3/10/2021:  Technically difficult study secondary to body habitus. Overall normal LV size with mild concentric LVH.  LVEF 65-70%.  No obvious regional wall motion abnormalities but endocardium difficult to visualize.  Impaired relaxation. The RV  is difficult to visualize in subcostal views are of poor quality.  The RV is grossly normal in size and function in the apical views. · Trileaflet aortic valve.  No aortic stenosis.  Sclerotic mitral valve leaflets with trace MR.  No significant pericardial effusion.     Exercise MPI 3/10/2021:  Abnormal exercise nuclear stress test.  Reversible defects are noted in the lateral wall, apex, and anterior wall suggestive of ischemia.  No prior study for comparison.  LVEF 67%.  No transient ischemic dilatation.  EKG portion is negative for arrhythmias or ischemic changes.  Patient exercised on Abner protocol for 3 minutes 4.6 METS.  Poor exercise tolerance.  No chest pain reported.  Peak blood pressure 144/88 mmHg.  Patient achieved 92% of predicted maximal heart rate.     LE arterial duplex bilateral 7/22/2020: Triphasic waveforms throughout the lower extremity with at least two-vessel runoff to the foot.  The left lower extremity has triphasic waveforms with at least two-vessel runoff.  There is a probable stenosis at the origin of the profunda.  No significant change.       LE arterial duplex bilateral 7/17/2019: Triphasic and biphasic waveforms throughout the right and left lower extremities.  No evidence of discrete stenosis or obstruction.       Exercise stress  echocardiogram 4/19/2019: Very technically difficult study.  Exercise EKG negative for ischemia or arrhythmia. Poor exercise capacity completing only stage I Abner protocol.  Exercise EKG negative for ischemia or arrhythmia at that workload.  At rest, normal LV size and function with LVEF 60-65%.  Basal mid inferior hypokinesis.  Mildly dilated RV with preserved function.  Trace TR.  Trace MR.  Trileaflet aortic valve.  No significant stenosis or insufficiency.  Prominent pericardial fat pad.  Immediately following exercise overall LV function improves.  Persistent basal inferior hypokinesis consistent with scar. No ischemia.       PVR 1/9/2019: Right JOURDAN 1.12 at rest and 1.1 following exercise.  Left JOURDAN 0.95 at rest and 0.96 following exercise.  Normal segmental pressures and PVRs.       Periph Intervention 10/2017: Successful right iliac stent.     Abd Ao Duplex 9/2017: Elevated velocities right MICHAEL with monophasic flow.     Echo 9/2017: LVEF 65%, Top normal LV size with overall preserved function. No significant pulmonary hypertension by Doppler. Technically difficult study.     Cath 8/13/17: LAD tortuous, no significant disease. RCA dominant and patent. Left circumflex with 90% proximal lesion, 95% mid lesion and distal thrombus. Successful NERI ×2 left circumflex. Occluded right MICHAEL at origin with collaterals. Patent left MICHAEL and CFA    EKG  Assessment/Plan     1. CAD in native artery  Mirela has coronary artery disease first diagnosed several years ago when she experienced an acute coronary syndrome while on vacation in [place].  At that time she underwent circumflex intervention and then had intervention on a 90% LAD in March 2021.  Her disease was quickly progressive-in 2017 the LAD was described as tortuous but with no significant disease.  Given the fact that she is diabetic with coronary disease and peripheral vascular disease, I recommended that she start low-dose Xarelto 2.5 mg daily.  Based on the  COMPASS trial, rivaroxaban 2.5 mg twice daily demonstrated a 24% reduction of major cardiovascular events in patients with chronic CAD and or PAD when used with low-dose aspirin.  There was a 42% reduction in stroke, 22% reduction in cardiovascular death and 14% reduction in myocardial infarction.  I explained that she may experience increased bleeding and if this is the case she should notify us and seek immediate medical attention if necessary.    2. Peripheral vascular disease (CMS/HCC)  Last noninvasive study was stable.  No claudication.  I encouraged her with respect to physical activity.  Initiating low-dose rivaroxaban.  Continue atorvastatin with LDL goal less than 70 mg/dL.  - rivaroxaban (XARELTO) 2.5 mg tablet tablet; Take 1 tablet (2.5 mg total) by mouth 2 (two) times a day.  Dispense: 180 tablet; Refill: 3  - atorvastatin (LIPITOR) 80 mg tablet; Take 1 tablet (80 mg total) by mouth once daily.  Dispense: 90 tablet; Refill: 3    3. Claudication (CMS/[facility])  Denies claudication at the present time.  Continue aspirin and atorvastatin.  Adding low-dose rivaroxaban.    4. Primary hypertension  Blood pressure is controlled.  Continue lisinopril 10 mg daily along with metoprolol 100 mg daily.  She is already on Jardiance.    5. Abnormal EKG  EKG shows no acute changes.    6. Type 2 diabetes mellitus without complication, without long-term current use of insulin (CMS/[facility])  Continue current regimen.  On SGLT2 inhibitor, statin and ACE inhibitor.  - metFORMIN XR (GLUCOPHAGE-XR) 750 mg 24 hr tablet; Take 750 mg by mouth daily with dinner.   Dispense: 180 tablet; Refill: 3    7. Dyspnea on exertion  Dyspnea is stable and I think she has a significant component of deconditioning.  No evidence of volume overload.  No EKG changes.  Blood pressure is controlled.    Return in about 6 months (around 3/6/2023) for follow-up, earlier if any change in symptoms.  Available medical records reviewed and updated including  laboratory data, imaging studies, previous outpatient and inpatient records.  Counseling/education performed. Care everywhere utilized where appropriate.    Thank you for allowing me to participate in the care of this patient.  I hope this information is helpful.      Zara Mccloud MD Located within [facility], [facility]  9/13/2022  6:00 PM  This document was generated utilizing voice recognition technology. Please excuse any typographical errors which may be present.

## 2022-09-13 NOTE — PROGRESS NOTES
"     Cardiology Office Visit     Patient ID: Mirela Lopez 57 y.o. female 1965  PCP: Nora Gudino DO   History Present Illness     Mirela Lopez presents to the office today for follow-up of her coronary artery disease with previous percutaneous intervention on a 90% LAD lesion in March 2021.  Prior to that she had circumflex intervention while on vacation in [place] several years ago.  She is feeling well and denies any anginal symptoms.  Mirela is looking forward to her upcoming [facility].  She has completed dual antiplatelet therapy and denies any bleeding.  She had vascular studies which we reviewed and these appear to be stable.  She denies any increasing claudication.    Allergies/Medications   Allergies:Adhesive    Medications:    aspirin, Take 81 mg by mouth daily.    atorvastatin, Take 1 tablet (80 mg total) by mouth once daily.    blood sugar diagnostic, Use on [place]    blood-glucose meter, Use as instructed    ferrous sulfate, Take 1 tablet by mouth daily with breakfast.      glipiZIDE, Take 1 tablet (5 mg total) by mouth daily.    JARDIANCE, take 1 tablet by mouth every day    lancets, 1 lancet daily.    lisinopriL, Take 1 tablet (10 mg total) by mouth once daily.    metFORMIN XR, Take 750 mg by mouth daily with dinner.     metoprolol succinate XL, Take 1 tablet (100 mg total) by mouth once daily.    nitroglycerin, Place 1 tablet (0.4 mg total) under the tongue every 5 (five) minutes as needed for chest pain.    rivaroxaban, Take 1 tablet (2.5 mg total) by mouth 2 (two) times a day.   Physical Exam     Vitals:    09/06/22 1118   BP: 120/70   BP Location: Left upper arm   Patient Position: Sitting   Pulse: 73   Resp: 16   Weight: 105 kg (232 lb 3.2 oz)   Height: 1.626 m (5' 4\")     BP Readings from Last 3 Encounters:   09/06/22 120/70   03/01/22 118/78   10/11/21 120/80     Wt Readings from Last 3 Encounters:   09/06/22 105 kg (232 lb 3.2 oz)   06/22/22 101 kg (223 lb)   03/01/22 " 101 kg (223 lb)      Physical Exam:  Constitutional: Obese white female.  No apparent distress  Eyes: No icterus  ENT:  Mucosa moist  Neck: Supple, no JVD or hepatojugular reflux.  No bruits.  Cardiac: Normal S1 and S2, regular rate and rhythm, no S3.  No rub.  No ectopy.  There is a 1/6 systolic murmur at the left sternal border.  Lungs: Clear to auscultation bilaterally.  No wheezing.  Abdomen: Soft, nontender, positive normoactive bowel sounds.  No bruit.  Extremities: No clubbing or cyanosis.  No calf tenderness.  No edema.  Distal pulses are intact.  Skin: Warm and dry.  No jaundice.  Musculoskeletal: No joint swelling or erythema.  Neurologic: Awake, alert, oriented.  Moving all extremities.  Psychiatric: No agitation.    Labs/Imaging/Procedures   Labs  Lab Results   Component Value Date    WBC 8.5 11/24/2021    HGB 12.6 11/24/2021     11/24/2021    ALT 18 11/24/2021    AST 13 11/24/2021     11/24/2021    K 4.7 11/24/2021     11/24/2021    CREATININE 0.51 (L) 11/24/2021    BUN 14 11/24/2021    CO2 25 11/24/2021    TSH 2.420 06/15/2021    INR 1.0 03/25/2021    HGBA1C 6.7 (H) 11/24/2021     Lab Results   Component Value Date    CHOL 115 11/24/2021    CHOL 105 06/15/2021    HDL 35 (L) 11/24/2021    HDL 31 (L) 06/15/2021     Lab Results   Component Value Date    LDLCALC 57 11/24/2021    LDLCALC 50 06/15/2021     Imaging  PVR 6/22/22: Resting JOURDAN within normal range-1.13 on the right and 0.9 on the left.  PVR is normal on the left and nondiagnostic on the right.    PVR lower extremity 6/23/2021: Normal ABIs bilaterally at rest.  History of kissing iliac stents.       Cardiac catheterization 3/30/2021: Discrete 90% stenosis in the mid LAD followed by tubular 70% stenosis.  Prior left circumflex stents patent.  Patient had successful NERI of LAD lesions.       Echo 3/10/2021:  Technically difficult study secondary to body habitus. Overall normal LV size with mild concentric LVH.  LVEF 65-70%.  No  obvious regional wall motion abnormalities but endocardium difficult to visualize.  Impaired relaxation. The RV  is difficult to visualize in subcostal views are of poor quality.  The RV is grossly normal in size and function in the apical views. · Trileaflet aortic valve.  No aortic stenosis.  Sclerotic mitral valve leaflets with trace MR.  No significant pericardial effusion.     Exercise MPI 3/10/2021:  Abnormal exercise nuclear stress test.  Reversible defects are noted in the lateral wall, apex, and anterior wall suggestive of ischemia.  No prior study for comparison.  LVEF 67%.  No transient ischemic dilatation.  EKG portion is negative for arrhythmias or ischemic changes.  Patient exercised on Abner protocol for 3 minutes 4.6 METS.  Poor exercise tolerance.  No chest pain reported.  Peak blood pressure 144/88 mmHg.  Patient achieved 92% of predicted maximal heart rate.     LE arterial duplex bilateral 7/22/2020: Triphasic waveforms throughout the lower extremity with at least two-vessel runoff to the foot.  The left lower extremity has triphasic waveforms with at least two-vessel runoff.  There is a probable stenosis at the origin of the profunda.  No significant change.       LE arterial duplex bilateral 7/17/2019: Triphasic and biphasic waveforms throughout the right and left lower extremities.  No evidence of discrete stenosis or obstruction.       Exercise stress echocardiogram 4/19/2019: Very technically difficult study.  Exercise EKG negative for ischemia or arrhythmia. Poor exercise capacity completing only stage I Abner protocol.  Exercise EKG negative for ischemia or arrhythmia at that workload.  At rest, normal LV size and function with LVEF 60-65%.  Basal mid inferior hypokinesis.  Mildly dilated RV with preserved function.  Trace TR.  Trace MR.  Trileaflet aortic valve.  No significant stenosis or insufficiency.  Prominent pericardial fat pad.  Immediately following exercise overall LV function  improves.  Persistent basal inferior hypokinesis consistent with scar. No ischemia.       PVR 1/9/2019: Right JOURDAN 1.12 at rest and 1.1 following exercise.  Left JOURDAN 0.95 at rest and 0.96 following exercise.  Normal segmental pressures and PVRs.       Periph Intervention 10/2017: Successful right iliac stent.     Abd Ao Duplex 9/2017: Elevated velocities right MICHAEL with monophasic flow.     Echo 9/2017: LVEF 65%, Top normal LV size with overall preserved function. No significant pulmonary hypertension by Doppler. Technically difficult study.     Cath 8/13/17: LAD tortuous, no significant disease. RCA dominant and patent. Left circumflex with 90% proximal lesion, 95% mid lesion and distal thrombus. Successful NERI ×2 left circumflex. Occluded right MICHAEL at origin with collaterals. Patent left MICHAEL and CFA    EKG  Assessment/Plan     1. CAD in native artery  Mirela has coronary artery disease first diagnosed several years ago when she experienced an acute coronary syndrome while on vacation in [place].  At that time she underwent circumflex intervention and then had intervention on a 90% LAD in March 2021.  Her disease was quickly progressive-in 2017 the LAD was described as tortuous but with no significant disease.  Given the fact that she is diabetic with coronary disease and peripheral vascular disease, I recommended that she start low-dose Xarelto 2.5 mg daily.  Based on the COMPASS trial, rivaroxaban 2.5 mg twice daily demonstrated a 24% reduction of major cardiovascular events in patients with chronic CAD and or PAD when used with low-dose aspirin.  There was a 42% reduction in stroke, 22% reduction in cardiovascular death and 14% reduction in myocardial infarction.  I explained that she may experience increased bleeding and if this is the case she should notify us and seek immediate medical attention if necessary.    2. Peripheral vascular disease (CMS/HCC)  Last noninvasive study was stable.  No claudication.  I  encouraged her with respect to physical activity.  Initiating low-dose rivaroxaban.  Continue atorvastatin with LDL goal less than 70 mg/dL.  - rivaroxaban (XARELTO) 2.5 mg tablet tablet; Take 1 tablet (2.5 mg total) by mouth 2 (two) times a day.  Dispense: 180 tablet; Refill: 3  - atorvastatin (LIPITOR) 80 mg tablet; Take 1 tablet (80 mg total) by mouth once daily.  Dispense: 90 tablet; Refill: 3    3. Claudication (CMS/[facility])  Denies claudication at the present time.  Continue aspirin and atorvastatin.  Adding low-dose rivaroxaban.    4. Primary hypertension  Blood pressure is controlled.  Continue lisinopril 10 mg daily along with metoprolol 100 mg daily.  She is already on Jardiance.    5. Abnormal EKG  EKG shows no acute changes.    6. Type 2 diabetes mellitus without complication, without long-term current use of insulin (CMS/[facility])  Continue current regimen.  On SGLT2 inhibitor, statin and ACE inhibitor.  - metFORMIN XR (GLUCOPHAGE-XR) 750 mg 24 hr tablet; Take 750 mg by mouth daily with dinner.   Dispense: 180 tablet; Refill: 3    7. Dyspnea on exertion  Dyspnea is stable and I think she has a significant component of deconditioning.  No evidence of volume overload.  No EKG changes.  Blood pressure is controlled.    Return in about 6 months (around 3/6/2023) for follow-up, earlier if any change in symptoms.  Available medical records reviewed and updated including laboratory data, imaging studies, previous outpatient and inpatient records.  Counseling/education performed. Care everywhere utilized where appropriate.    Thank you for allowing me to participate in the care of this patient.  I hope this information is helpful.      Zara Mccloud MD [facility], [facility]  9/13/2022  6:00 PM  This document was generated utilizing voice recognition technology. Please excuse any typographical errors which may be present.

## 2022-10-05 RX ORDER — GLIPIZIDE 5 MG/1
TABLET, FILM COATED, EXTENDED RELEASE ORAL
Qty: 90 TABLET | Refills: 0 | Status: SHIPPED | OUTPATIENT
Start: 2022-10-05 | End: 2023-04-25

## 2022-12-06 ENCOUNTER — TELEPHONE (OUTPATIENT)
Dept: PRIMARY CARE | Facility: CLINIC | Age: 57
End: 2022-12-06

## 2022-12-06 NOTE — TELEPHONE ENCOUNTER
[facility] called to confirm receipt of document mailed on 11/8/22.    Document confirmed in media    [facility] requesting a call when doc is sent back.    Justino [phone]/ fax# [phone]

## 2023-01-09 ENCOUNTER — OFFICE VISIT (OUTPATIENT)
Dept: OBSTETRICS AND GYNECOLOGY | Facility: CLINIC | Age: 58
End: 2023-01-09
Payer: COMMERCIAL

## 2023-01-09 VITALS
DIASTOLIC BLOOD PRESSURE: 76 MMHG | HEIGHT: 64 IN | SYSTOLIC BLOOD PRESSURE: 124 MMHG | BODY MASS INDEX: 38.93 KG/M2 | WEIGHT: 228 LBS

## 2023-01-09 DIAGNOSIS — Z01.419 WELL WOMAN EXAM WITH ROUTINE GYNECOLOGICAL EXAM: Primary | ICD-10-CM

## 2023-01-09 DIAGNOSIS — Z12.31 BREAST CANCER SCREENING BY MAMMOGRAM: ICD-10-CM

## 2023-01-09 PROCEDURE — S0610 ANNUAL GYNECOLOGICAL EXAMINA: HCPCS | Performed by: OBSTETRICS & GYNECOLOGY

## 2023-01-09 NOTE — PROGRESS NOTES
Visit Date: 2023   Mirela Lopez is 57 y.o. female presenting today for Annual GYN Exam    HPI:  Menstrual Cycle: Patient's last menstrual period was approximately 5 years ago. She is not experiencing any vaginal bleeding, spotting, or pink discharge.  Sexually Activity: Patient is sexually active without difficulty.  Bowel and Bladder function: Normal per patient.  Pap Smears: does not have a history of abnormal pap smears, Last pap was in 2019 and was neg/neg.   Patient is not experiencing hot flashes.  Patient is not experiencing night sweats.  Patient is not experiencing vaginal dryness.  Patient  does not report any breast issues.  Patient's Last Mammogram was in 2021 and was  Birad 1  Patient's Last Dexa Scan: n/a  Patient does have a family history of breast or gyn cancers. (mom had breast cancer, postmenopausal diagnosis)  Patient  does not desire screening for STIs today.   Patient does not have a history of Domestic Violence/Verbal Abuse/Sexual Assault. She does feel safe in her current environment.     Past Medical History:  has a past medical history of Breast cancer screening by mammogram (2021), Diabetes mellitus (CMS/[facility]), Encounter for Papanicolaou smear for cervical cancer screening (2019), GERD (gastroesophageal reflux disease), Hyperlipidemia, and Peripheral vascular disease (CMS/[facility]) ().    She has no past medical history of Abnormal Pap smear of cervix.    Past Surgical History:  has a past surgical history that includes Carpal tunnel release;  section (2003); Iliac artery stent (10/2017); Cardiac catheterization (); Coronary stent placement (); and [place] tooth extraction ().    Medications:   Current Outpatient Medications:   •  aspirin 81 mg enteric coated tablet, Take 81 mg by mouth daily., Disp: , Rfl:   •  atorvastatin (LIPITOR) 80 mg tablet, Take 1 tablet (80 mg total) by mouth once daily., Disp: 90 tablet, Rfl: 3  •  [facility], Disp: 100 strip, Rfl: 6  •  blood-glucose meter kit, Use as instructed, Disp: 1 each, Rfl: 0  •  ferrous sulfate 134 mg (27 mg iron) tablet, Take 1 tablet by mouth daily with breakfast.  , Disp: , Rfl: 6  •  glipiZIDE (GLUCOTROL XL) 5 mg 24 hr tablet, TAKE ONE TABLET BY MOUTH DAILY, Disp: 90 tablet, Rfl: 0  •  JARDIANCE 25 mg tablet, take 1 tablet by mouth every day, Disp: 90 tablet, Rfl: 3  •  lancets 21 gauge misc, 1 lancet daily., Disp: 100 each, Rfl: 6  •  lisinopriL (PRINIVIL) 10 mg tablet, Take 1 tablet (10 mg total) by mouth once daily., Disp: 90 tablet, Rfl: 3  •  metFORMIN XR (GLUCOPHAGE-XR) 750 mg 24 hr tablet, Take 750 mg by mouth daily with dinner. , Disp: 180 tablet, Rfl: 3  •  metoprolol succinate XL (TOPROL-XL) 100 mg 24 hr tablet, Take 1 tablet (100 mg total) by mouth once daily., Disp: 90 tablet, Rfl: 3  •  nitroglycerin (NITROSTAT) 0.4 mg SL tablet, Place 1 tablet (0.4 mg total) under the tongue every 5 (five) minutes as needed for chest pain., Disp: 25 tablet, Rfl: 3  •  rivaroxaban (XARELTO) 2.5 mg tablet tablet, Take 1 tablet (2.5 mg total) by mouth 2 (two) times a day., Disp: 180 tablet, Rfl: 3      Allergies: is allergic to adhesive.     Family History: family history includes Breast cancer in her biological mother; Diabetes in her biological father; Diabetes type II in her maternal grandmother; Heart disease in her biological father; Hyperlipidemia in her biological father; Lung cancer in her biological mother.    Social History:   Social History     Tobacco Use   • Smoking status: Former     Types: Cigarettes     Quit date: 2017     Years since quittin.4   • Smokeless tobacco: Never   Vaping Use   • Vaping Use: Never used   Substance Use Topics   • Alcohol use: Yes     Comment: Occasional social alcohol   • Drug use: No       Review of Systems  Constitutional:   No hot flashes.   No night sweats.   No unexpected weight changes.  HENT:   No oral  "ulcers.   No headaches related to menstrual cycle.   Breasts:   No changes to skin of the breast or nipple.   No nipple discharge.   No breast lumps/cysts.   No breast pain.   Patient has not noticed any changes to breasts.   Respiratory:   No shortness of breath.  Cardiovascular:   No chest pain  Gastrointestinal:   No changes in bowel habits.  Genitourinary:   No pain with urination.   No urgency with urination.   No increased frequency of urination.   No urinary incontinence.   No vaginal discharge.   No painful intercourse.   No genital sores.   No irregular menstrual cycles.   No heavy menstrual cycles.   No painful menstrual periods.   No bleeding between menstrual cycles.   No bleeding after intercourse.  No absence of menstrual periods.  Integument:   No changes to any existing genital skin lesions or moles.   No new vaginal skin lesions or moles.   No genital rashes.   Endocrine:   No increased hair growth   Psychiatric:   No anxiety.   No depression.   No suicidal ideation.   Heme-Lymph:   No easy bruising.   No unexplained lumps.           Visit Vitals  /76 (BP Location: Right upper arm, Patient Position: Sitting)   Ht 1.626 m (5' 4\")   Wt 103 kg (228 lb)   BMI 39.14 kg/m²       OBGyn Exam  General Appearance: Alert, cooperative, no acute distress  Head: Normocephalic, without obvious abnormality  Breast: Breasts: breasts appear normal, no suspicious masses, no skin or nipple changes or axillary nodes.  Abdomen: Soft, nontender, nondistended,no masses, no organomegaly  Pelvic exam: VULVA: normal appearing vulva with no masses, tenderness or lesions. VAGINA: normal appearing vagina with normal color and discharge, no lesions. Atrophy noted. CERVIX: normal appearing cervix without discharge or lesions. UTERUS: uterus is normal size, shape, consistency and non-tender. ADNEXA: normal adnexa in size, nontender and no masses.     Assessment and Plan:  57 y.o. yo presents for an annual exam.   - Pap  with " HPV collected today.  - STI Testing: GC/CT/Trich declined. HIV/RPR declined.  - Mammogram slip  provided today.  -  Dexa slip  not indicated today.   - Return to office 1 year or prn  - Patient to check MyChart for results in 7-10 days. She is aware to call the office with any questions.    Gladys Kunz, DO

## 2023-01-13 LAB
CYTOLOGIST CVX/VAG CYTO: NORMAL
CYTOLOGY CVX/VAG DOC CYTO: NORMAL
CYTOLOGY CVX/VAG DOC THIN PREP: NORMAL
DX ICD CODE: NORMAL
HPV I/H RISK 4 DNA CVX QL PROBE+SIG AMP: NEGATIVE
LAB CORP NOTE:: NORMAL
LC HPV GENOTYPE REFLEX: NORMAL
OTHER STN SPEC: NORMAL
STAT OF ADQ CVX/VAG CYTO-IMP: NORMAL

## 2023-01-30 ENCOUNTER — HOSPITAL ENCOUNTER (OUTPATIENT)
Dept: RADIOLOGY | Facility: HOSPITAL | Age: 58
Discharge: HOME | End: 2023-01-30
Attending: OBSTETRICS & GYNECOLOGY
Payer: COMMERCIAL

## 2023-01-30 DIAGNOSIS — Z12.31 BREAST CANCER SCREENING BY MAMMOGRAM: ICD-10-CM

## 2023-01-30 PROCEDURE — 77063 BREAST TOMOSYNTHESIS BI: CPT

## 2023-03-30 ENCOUNTER — OFFICE VISIT (OUTPATIENT)
Dept: PRIMARY CARE | Facility: CLINIC | Age: 58
End: 2023-03-30
Payer: COMMERCIAL

## 2023-03-30 VITALS
OXYGEN SATURATION: 97 % | BODY MASS INDEX: 39.27 KG/M2 | TEMPERATURE: 97.5 F | HEART RATE: 93 BPM | DIASTOLIC BLOOD PRESSURE: 70 MMHG | SYSTOLIC BLOOD PRESSURE: 117 MMHG | HEIGHT: 64 IN | RESPIRATION RATE: 15 BRPM | WEIGHT: 230 LBS

## 2023-03-30 DIAGNOSIS — D76.3 ROSAI-DORFMAN DISEASE (CMS/HCC): ICD-10-CM

## 2023-03-30 DIAGNOSIS — E78.00 PURE HYPERCHOLESTEROLEMIA: ICD-10-CM

## 2023-03-30 DIAGNOSIS — Z23 NEED FOR SHINGLES VACCINE: ICD-10-CM

## 2023-03-30 DIAGNOSIS — E61.1 IRON DEFICIENCY: ICD-10-CM

## 2023-03-30 DIAGNOSIS — I10 ESSENTIAL HYPERTENSION: Primary | ICD-10-CM

## 2023-03-30 DIAGNOSIS — E11.9 TYPE 2 DIABETES MELLITUS WITHOUT COMPLICATION, WITHOUT LONG-TERM CURRENT USE OF INSULIN (CMS/HCC): ICD-10-CM

## 2023-03-30 PROCEDURE — 3074F SYST BP LT 130 MM HG: CPT | Performed by: INTERNAL MEDICINE

## 2023-03-30 PROCEDURE — 99214 OFFICE O/P EST MOD 30 MIN: CPT | Performed by: INTERNAL MEDICINE

## 2023-03-30 PROCEDURE — 3008F BODY MASS INDEX DOCD: CPT | Performed by: INTERNAL MEDICINE

## 2023-03-30 PROCEDURE — 3078F DIAST BP <80 MM HG: CPT | Performed by: INTERNAL MEDICINE

## 2023-03-30 RX ORDER — METFORMIN HYDROCHLORIDE 750 MG/1
TABLET, EXTENDED RELEASE ORAL
Qty: 180 TABLET | Refills: 3 | Status: SHIPPED | OUTPATIENT
Start: 2023-03-30 | End: 2024-07-22

## 2023-03-30 RX ORDER — FLUTICASONE PROPIONATE 50 MCG
1 SPRAY, SUSPENSION (ML) NASAL 2 TIMES DAILY
COMMUNITY
Start: 2023-01-25 | End: 2023-05-02 | Stop reason: ALTCHOICE

## 2023-03-30 ASSESSMENT — PATIENT HEALTH QUESTIONNAIRE - PHQ9: SUM OF ALL RESPONSES TO PHQ9 QUESTIONS 1 & 2: 0

## 2023-03-30 ASSESSMENT — PAIN SCALES - GENERAL: PAINLEVEL: 0-NO PAIN

## 2023-03-30 NOTE — PROGRESS NOTES
"  Subjective      Patient ID: Mirela Lopez is a 57 y.o. female.  1965      Lidya has been quite stressed caring for sick family members.  Her diet has been off and her eercise time has been also impacted.  She has not had time to get her labs done but did get a DM foot exam and also retinal exam.  Has not checked blood sugars recently.       The following have been reviewed and updated as appropriate in this visit:   Problems       Review of Systems   All other systems reviewed and are negative.      Objective     Vitals:    03/30/23 1016   BP: 117/70   BP Location: Right upper arm   Patient Position: Sitting   Pulse: 93   Resp: 15   Temp: 36.4 °C (97.5 °F)   TempSrc: Temporal   SpO2: 97%   Weight: 104 kg (230 lb)   Height: 1.626 m (5' 4\")     Body mass index is 39.48 kg/m².    Physical Exam  Vitals reviewed.   Constitutional:       Appearance: Normal appearance. She is well-developed.   Cardiovascular:      Rate and Rhythm: Normal rate and regular rhythm.      Heart sounds: Normal heart sounds.   Pulmonary:      Effort: Pulmonary effort is normal.      Breath sounds: Normal breath sounds.   Musculoskeletal:         General: No swelling.   Neurological:      Mental Status: She is alert.         Assessment/Plan   Diagnoses and all orders for this visit:    Essential hypertension (Primary)  -     Comprehensive metabolic panel; Future  Controlled, cont Toprol and prinivil     Rosai-Ileana disease (CMS/HCC)  Benign, stable     Type 2 diabetes mellitus without complication, without long-term current use of insulin (CMS/HCC)  -     Comprehensive metabolic panel; Future  -     Hemoglobin A1c; Future  -     Microalbumin / creatinine urine ratio; Future  -     metFORMIN XR (GLUCOPHAGE-XR) 750 mg 24 hr tablet; Take two daily  Had podiatric exam get retinal report   Cont Jardiance and Glucotrol     Iron deficiency  -     CBC; Future  -     Ferritin; Future  -     Iron and TIBC; Future      Pure hypercholesterolemia  -   "   Comprehensive metabolic panel; Future  -     Lipid panel; Future  -     TSH w reflex FT4; Future  Cont Atorvastatin

## 2023-04-19 NOTE — Clinical Note
ID band present and verified with patient name and .   oriented to person, place and time , normal sensation , short and long term memory intact

## 2023-05-02 ENCOUNTER — OFFICE VISIT (OUTPATIENT)
Dept: CARDIOLOGY | Facility: CLINIC | Age: 58
End: 2023-05-02
Payer: COMMERCIAL

## 2023-05-02 VITALS
SYSTOLIC BLOOD PRESSURE: 124 MMHG | HEIGHT: 64 IN | WEIGHT: 234 LBS | DIASTOLIC BLOOD PRESSURE: 70 MMHG | HEART RATE: 76 BPM | BODY MASS INDEX: 39.95 KG/M2 | RESPIRATION RATE: 16 BRPM

## 2023-05-02 DIAGNOSIS — I10 PRIMARY HYPERTENSION: ICD-10-CM

## 2023-05-02 DIAGNOSIS — R94.31 ABNORMAL EKG: ICD-10-CM

## 2023-05-02 DIAGNOSIS — E66.01 CLASS 3 SEVERE OBESITY DUE TO EXCESS CALORIES WITH SERIOUS COMORBIDITY AND BODY MASS INDEX (BMI) OF 40.0 TO 44.9 IN ADULT (CMS/HCC): ICD-10-CM

## 2023-05-02 DIAGNOSIS — I73.9 CLAUDICATION (CMS/HCC): ICD-10-CM

## 2023-05-02 DIAGNOSIS — E11.9 TYPE 2 DIABETES MELLITUS WITHOUT COMPLICATION, WITHOUT LONG-TERM CURRENT USE OF INSULIN (CMS/HCC): ICD-10-CM

## 2023-05-02 DIAGNOSIS — I25.10 CAD IN NATIVE ARTERY: Primary | ICD-10-CM

## 2023-05-02 DIAGNOSIS — I73.9 PERIPHERAL VASCULAR DISEASE (CMS/HCC): ICD-10-CM

## 2023-05-02 DIAGNOSIS — E66.813 CLASS 3 SEVERE OBESITY DUE TO EXCESS CALORIES WITH SERIOUS COMORBIDITY AND BODY MASS INDEX (BMI) OF 40.0 TO 44.9 IN ADULT (CMS/HCC): ICD-10-CM

## 2023-05-02 PROCEDURE — 3008F BODY MASS INDEX DOCD: CPT | Performed by: INTERNAL MEDICINE

## 2023-05-02 PROCEDURE — 3074F SYST BP LT 130 MM HG: CPT | Performed by: INTERNAL MEDICINE

## 2023-05-02 PROCEDURE — 99214 OFFICE O/P EST MOD 30 MIN: CPT | Performed by: INTERNAL MEDICINE

## 2023-05-02 PROCEDURE — 3078F DIAST BP <80 MM HG: CPT | Performed by: INTERNAL MEDICINE

## 2023-05-02 PROCEDURE — 93000 ELECTROCARDIOGRAM COMPLETE: CPT | Performed by: INTERNAL MEDICINE

## 2023-05-02 NOTE — LETTER
May 6, 2023     Nora Gudino DO  [address] [facility] [address]    Patient: Mirela Lopez  YOB: 1965  Date of Visit: 5/2/2023      Dear Dr. Gudino:    Thank you for referring Mirela Lopez to me for evaluation. Below are my notes for this consultation.    If you have questions, please do not hesitate to call me. I look forward to following your patient along with you.         Sincerely,        Zara Mccloud MD        CC: MD Oneyda Naik Erin, MD  5/6/2023  3:35 PM  Signed       Cardiology Office Visit     Patient ID: Mirela Lopez 57 y.o. female 1965  PCP: Nora Gudino DO   History Present Illness     Mirela Lopez returns to the office today for follow-up of her coronary disease with previous percutaneous intervention on a 90% LAD lesion in March 2021 as well as a prior circumflex intervention while on vacation in [place] in 2017.  She had acute coronary syndrome at her first presentation, but the LAD intervention follow-up an abnormal nuclear stress test.  She also has peripheral vascular disease but has been stable-there is a history of kissing iliac stents.    Today in the office Mirela says that her father-in-law passed away overnight, so she is understandably upset.  She does not have any exertional chest discomfort, PND or orthopnea.  She does not feel that she is experiencing any claudication symptoms.  She retired earlier this year and is enjoying this quite a bit.    Allergies/Medications   Allergies:Adhesive    Medications:  •  aspirin, Take 81 mg by mouth daily.  •  atorvastatin, Take 1 tablet (80 mg total) by mouth once daily.  •  blood sugar diagnostic, Use on edaily  •  ferrous sulfate, Take 1 tablet by mouth daily with breakfast.    •  glipiZIDE, TAKE ONE TABLET BY MOUTH DAILY  •  JARDIANCE, take 1 tablet by mouth every day  •  lancets, 1 lancet daily.  •  lisinopriL, Take 1 tablet (10 mg total) by mouth once daily.  •  metFORMIN  "XR, Take two daily  •  metoprolol succinate XL, Take 1 tablet (100 mg total) by mouth once daily.  •  nitroglycerin, Place 1 tablet (0.4 mg total) under the tongue every 5 (five) minutes as needed for chest pain.  •  rivaroxaban, Take 1 tablet (2.5 mg total) by mouth 2 (two) times a day.     Physical Exam     Vitals:    05/02/23 0941   BP: 124/70   BP Location: Left upper arm   Patient Position: Sitting   Pulse: 76   Resp: 16   Weight: 106 kg (234 lb)   Height: 1.626 m (5' 4.02\")     BP Readings from Last 3 Encounters:   05/02/23 124/70   03/30/23 117/70   01/09/23 124/76     Wt Readings from Last 3 Encounters:   05/02/23 106 kg (234 lb)   03/30/23 104 kg (230 lb)   01/09/23 103 kg (228 lb)      Physical Exam:  Constitutional: Obese white female.  No apparent distress.   Eyes: No icterus  ENT:  Mucosa moist  Neck: Supple, no JVD or hepatojugular reflux.  No bruits.  Cardiac: Normal S1 and S2, regular rate and rhythm, no S3.  No rub.  No ectopy.  There is a 1/6 systolic murmur at the left sternal border.  Lungs: Clear to auscultation bilaterally.  No wheezing.  Abdomen: Soft, nontender, positive normoactive bowel sounds.  No bruit.  Extremities: No clubbing or cyanosis.  No calf tenderness.  No edema.  Distal pulses are intact.  Skin: Warm and dry.  No jaundice.  Musculoskeletal: No joint swelling or erythema.  Neurologic: Awake, alert, oriented.  Moving all extremities.  Psychiatric: No agitation.    Labs/Imaging/Procedures   Labs  Lab Results   Component Value Date    WBC 8.5 11/24/2021    HGB 12.6 11/24/2021     11/24/2021    ALT 18 11/24/2021    AST 13 11/24/2021     11/24/2021    K 4.7 11/24/2021     11/24/2021    CREATININE 0.51 (L) 11/24/2021    BUN 14 11/24/2021    CO2 25 11/24/2021    TSH 2.420 06/15/2021    INR 1.0 03/25/2021    HGBA1C 6.7 (H) 11/24/2021     Lab Results   Component Value Date    CHOL 115 11/24/2021    CHOL 105 06/15/2021    HDL 35 (L) 11/24/2021    HDL 31 (L) 06/15/2021 "     Lab Results   Component Value Date    LDLCALC 57 11/24/2021    LDLCALC 50 06/15/2021     Imaging  PVR 6/22/22: Resting JOURDAN within normal range-1.13 on the right and 0.9 on the left.  PVR is normal on the left and nondiagnostic on the right.     PVR lower extremity 6/23/2021: Normal ABIs bilaterally at rest.  History of kissing iliac stents.       Cardiac catheterization 3/30/2021: Discrete 90% stenosis in the mid LAD followed by tubular 70% stenosis.  Prior left circumflex stents patent.  Patient had successful NERI of LAD lesions.       Echo 3/10/2021:  Technically difficult study secondary to body habitus. Overall normal LV size with mild concentric LVH.  LVEF 65-70%.  No obvious regional wall motion abnormalities but endocardium difficult to visualize.  Impaired relaxation. The RV  is difficult to visualize in subcostal views are of poor quality.  The RV is grossly normal in size and function in the apical views. · Trileaflet aortic valve.  No aortic stenosis.  Sclerotic mitral valve leaflets with trace MR.  No significant pericardial effusion.     Exercise MPI 3/10/2021:  Abnormal exercise nuclear stress test.  Reversible defects are noted in the lateral wall, apex, and anterior wall suggestive of ischemia.  No prior study for comparison.  LVEF 67%.  No transient ischemic dilatation.  EKG portion is negative for arrhythmias or ischemic changes.  Patient exercised on Abner protocol for 3 minutes 4.6 METS.  Poor exercise tolerance.  No chest pain reported.  Peak blood pressure 144/88 mmHg.  Patient achieved 92% of predicted maximal heart rate.     LE arterial duplex bilateral 7/22/2020: Triphasic waveforms throughout the lower extremity with at least two-vessel runoff to the foot.  The left lower extremity has triphasic waveforms with at least two-vessel runoff.  There is a probable stenosis at the origin of the profunda.  No significant change.       LE arterial duplex bilateral 7/17/2019: Triphasic and  biphasic waveforms throughout the right and left lower extremities.  No evidence of discrete stenosis or obstruction.       Exercise stress echocardiogram 4/19/2019: Very technically difficult study.  Exercise EKG negative for ischemia or arrhythmia. Poor exercise capacity completing only stage I Abner protocol.  Exercise EKG negative for ischemia or arrhythmia at that workload.  At rest, normal LV size and function with LVEF 60-65%.  Basal mid inferior hypokinesis.  Mildly dilated RV with preserved function.  Trace TR.  Trace MR.  Trileaflet aortic valve.  No significant stenosis or insufficiency.  Prominent pericardial fat pad.  Immediately following exercise overall LV function improves.  Persistent basal inferior hypokinesis consistent with scar. No ischemia.       PVR 1/9/2019: Right JOURDAN 1.12 at rest and 1.1 following exercise.  Left JOURDAN 0.95 at rest and 0.96 following exercise.  Normal segmental pressures and PVRs.       Periph Intervention 10/2017: Successful right iliac stent.     Abd Ao Duplex 9/2017: Elevated velocities right MICHAEL with monophasic flow.     Echo 9/2017: LVEF 65%, Top normal LV size with overall preserved function. No significant pulmonary hypertension by Doppler. Technically difficult study.     Cath 8/13/17: LAD tortuous, no significant disease. RCA dominant and patent. Left circumflex with 90% proximal lesion, 95% mid lesion and distal thrombus. Successful NERI ×2 left circumflex. Occluded right MICHAEL at origin with collaterals. Patent left MICHAEL and CFA      EKG  Assessment/Plan     1. CAD in native artery    2. Peripheral vascular disease (CMS/[facility])    3. Claudication (CMS/[facility])    4. Primary hypertension    5. Abnormal EKG    6. Type 2 diabetes mellitus without complication, without long-term current use of insulin (CMS/[facility])    7. Class 3 severe obesity due to excess calories with serious comorbidity and body mass index (BMI) of 40.0 to 44.9 in adult (CMS/HCC)       Mirela is doing well with no  anginal symptoms and no evidence of congestive heart failure.  There was no recent blood work for review, but she says that she knows she needs to get this performed.  She is tolerating aspirin and low-dose rivaroxaban without any bleeding issues.  Her blood pressure is controlled on lisinopril 10 mg daily and Toprol- mg daily.    Given the rather aggressive nature of her vascular disease, I asked her to schedule an echocardiogram and pharmacologic nuclear stress test at her convenience.    She would benefit from weight loss and now that she is retired, she is planning to become more physically active.  She is already started engaging in more physical activity, but she and her  were caring for her father-in-law.  Mediterranean style of eating has been encouraged.  I will follow-up with her via telephone or portal once her test results are complete.    Return in about 6 months (around 11/2/2023) for follow-up, earlier if any change in symptoms, and after studies have been completed.  Available medical records reviewed and updated including laboratory data, imaging studies, previous outpatient and inpatient records.  Counseling/education performed. Care everywhere utilized where appropriate.    Thank you for allowing me to participate in the care of this patient.  I hope this information is helpful.      Zara Mccloud MD Merged with [facility], [facility]  5/6/2023  3:32 PM  This document was generated utilizing voice recognition technology. Please excuse any typographical errors which may be present.

## 2023-05-06 NOTE — PROGRESS NOTES
"     Cardiology Office Visit     Patient ID: Mirela Lopez 57 y.o. female 1965  PCP: Nora Gudino, DO   History Present Illness     Mirela Lopez returns to the office today for follow-up of her coronary disease with previous percutaneous intervention on a 90% LAD lesion in March 2021 as well as a prior circumflex intervention while on vacation in [place] in 2017.  She had acute coronary syndrome at her first presentation, but the LAD intervention follow-up an abnormal nuclear stress test.  She also has peripheral vascular disease but has been stable-there is a history of kissing iliac stents.    Today in the office Mirela says that her father-in-law passed away overnight, so she is understandably upset.  She does not have any exertional chest discomfort, PND or orthopnea.  She does not feel that she is experiencing any claudication symptoms.  She retired earlier this year and is enjoying this quite a bit.    Allergies/Medications   Allergies:Adhesive    Medications:  •  aspirin, Take 81 mg by mouth daily.  •  atorvastatin, Take 1 tablet (80 mg total) by mouth once daily.  •  blood sugar diagnostic, Use on edaily  •  ferrous sulfate, Take 1 tablet by mouth daily with breakfast.    •  glipiZIDE, TAKE ONE TABLET BY MOUTH DAILY  •  JARDIANCE, take 1 tablet by mouth every day  •  lancets, 1 lancet daily.  •  lisinopriL, Take 1 tablet (10 mg total) by mouth once daily.  •  metFORMIN XR, Take two daily  •  metoprolol succinate XL, Take 1 tablet (100 mg total) by mouth once daily.  •  nitroglycerin, Place 1 tablet (0.4 mg total) under the tongue every 5 (five) minutes as needed for chest pain.  •  rivaroxaban, Take 1 tablet (2.5 mg total) by mouth 2 (two) times a day.     Physical Exam     Vitals:    05/02/23 0941   BP: 124/70   BP Location: Left upper arm   Patient Position: Sitting   Pulse: 76   Resp: 16   Weight: 106 kg (234 lb)   Height: 1.626 m (5' 4.02\")     BP Readings from Last 3 Encounters:   05/02/23 " 124/70   03/30/23 117/70   01/09/23 124/76     Wt Readings from Last 3 Encounters:   05/02/23 106 kg (234 lb)   03/30/23 104 kg (230 lb)   01/09/23 103 kg (228 lb)      Physical Exam:  Constitutional: Obese white female.  No apparent distress.   Eyes: No icterus  ENT:  Mucosa moist  Neck: Supple, no JVD or hepatojugular reflux.  No bruits.  Cardiac: Normal S1 and S2, regular rate and rhythm, no S3.  No rub.  No ectopy.  There is a 1/6 systolic murmur at the left sternal border.  Lungs: Clear to auscultation bilaterally.  No wheezing.  Abdomen: Soft, nontender, positive normoactive bowel sounds.  No bruit.  Extremities: No clubbing or cyanosis.  No calf tenderness.  No edema.  Distal pulses are intact.  Skin: Warm and dry.  No jaundice.  Musculoskeletal: No joint swelling or erythema.  Neurologic: Awake, alert, oriented.  Moving all extremities.  Psychiatric: No agitation.    Labs/Imaging/Procedures   Labs  Lab Results   Component Value Date    WBC 8.5 11/24/2021    HGB 12.6 11/24/2021     11/24/2021    ALT 18 11/24/2021    AST 13 11/24/2021     11/24/2021    K 4.7 11/24/2021     11/24/2021    CREATININE 0.51 (L) 11/24/2021    BUN 14 11/24/2021    CO2 25 11/24/2021    TSH 2.420 06/15/2021    INR 1.0 03/25/2021    HGBA1C 6.7 (H) 11/24/2021     Lab Results   Component Value Date    CHOL 115 11/24/2021    CHOL 105 06/15/2021    HDL 35 (L) 11/24/2021    HDL 31 (L) 06/15/2021     Lab Results   Component Value Date    LDLCALC 57 11/24/2021    LDLCALC 50 06/15/2021     Imaging  PVR 6/22/22: Resting JOURDAN within normal range-1.13 on the right and 0.9 on the left.  PVR is normal on the left and nondiagnostic on the right.     PVR lower extremity 6/23/2021: Normal ABIs bilaterally at rest.  History of kissing iliac stents.       Cardiac catheterization 3/30/2021: Discrete 90% stenosis in the mid LAD followed by tubular 70% stenosis.  Prior left circumflex stents patent.  Patient had successful NERI of LAD  lesions.       Echo 3/10/2021:  Technically difficult study secondary to body habitus. Overall normal LV size with mild concentric LVH.  LVEF 65-70%.  No obvious regional wall motion abnormalities but endocardium difficult to visualize.  Impaired relaxation. The RV  is difficult to visualize in subcostal views are of poor quality.  The RV is grossly normal in size and function in the apical views. · Trileaflet aortic valve.  No aortic stenosis.  Sclerotic mitral valve leaflets with trace MR.  No significant pericardial effusion.     Exercise MPI 3/10/2021:  Abnormal exercise nuclear stress test.  Reversible defects are noted in the lateral wall, apex, and anterior wall suggestive of ischemia.  No prior study for comparison.  LVEF 67%.  No transient ischemic dilatation.  EKG portion is negative for arrhythmias or ischemic changes.  Patient exercised on Abner protocol for 3 minutes 4.6 METS.  Poor exercise tolerance.  No chest pain reported.  Peak blood pressure 144/88 mmHg.  Patient achieved 92% of predicted maximal heart rate.     LE arterial duplex bilateral 7/22/2020: Triphasic waveforms throughout the lower extremity with at least two-vessel runoff to the foot.  The left lower extremity has triphasic waveforms with at least two-vessel runoff.  There is a probable stenosis at the origin of the profunda.  No significant change.       LE arterial duplex bilateral 7/17/2019: Triphasic and biphasic waveforms throughout the right and left lower extremities.  No evidence of discrete stenosis or obstruction.       Exercise stress echocardiogram 4/19/2019: Very technically difficult study.  Exercise EKG negative for ischemia or arrhythmia. Poor exercise capacity completing only stage I Abner protocol.  Exercise EKG negative for ischemia or arrhythmia at that workload.  At rest, normal LV size and function with LVEF 60-65%.  Basal mid inferior hypokinesis.  Mildly dilated RV with preserved function.  Trace TR.  Trace MR.   Trileaflet aortic valve.  No significant stenosis or insufficiency.  Prominent pericardial fat pad.  Immediately following exercise overall LV function improves.  Persistent basal inferior hypokinesis consistent with scar. No ischemia.       PVR 1/9/2019: Right JOURDAN 1.12 at rest and 1.1 following exercise.  Left JOURDAN 0.95 at rest and 0.96 following exercise.  Normal segmental pressures and PVRs.       Periph Intervention 10/2017: Successful right iliac stent.     Abd Ao Duplex 9/2017: Elevated velocities right MICHAEL with monophasic flow.     Echo 9/2017: LVEF 65%, Top normal LV size with overall preserved function. No significant pulmonary hypertension by Doppler. Technically difficult study.     Cath 8/13/17: LAD tortuous, no significant disease. RCA dominant and patent. Left circumflex with 90% proximal lesion, 95% mid lesion and distal thrombus. Successful NERI ×2 left circumflex. Occluded right MICHAEL at origin with collaterals. Patent left MICHAEL and CFA      EKG  Assessment/Plan     1. CAD in native artery    2. Peripheral vascular disease (CMS/[facility])    3. Claudication (CMS/[facility])    4. Primary hypertension    5. Abnormal EKG    6. Type 2 diabetes mellitus without complication, without long-term current use of insulin (CMS/[facility])    7. Class 3 severe obesity due to excess calories with serious comorbidity and body mass index (BMI) of 40.0 to 44.9 in adult (CMS/HCC)       Mirela is doing well with no anginal symptoms and no evidence of congestive heart failure.  There was no recent blood work for review, but she says that she knows she needs to get this performed.  She is tolerating aspirin and low-dose rivaroxaban without any bleeding issues.  Her blood pressure is controlled on lisinopril 10 mg daily and Toprol- mg daily.    Given the rather aggressive nature of her vascular disease, I asked her to schedule an echocardiogram and pharmacologic nuclear stress test at her convenience.    She would benefit from weight loss  and now that she is retired, she is planning to become more physically active.  She is already started engaging in more physical activity, but she and her  were caring for her father-in-law.  Mediterranean style of eating has been encouraged.  I will follow-up with her via telephone or portal once her test results are complete.    Return in about 6 months (around 11/2/2023) for follow-up, earlier if any change in symptoms, and after studies have been completed.  Available medical records reviewed and updated including laboratory data, imaging studies, previous outpatient and inpatient records.  Counseling/education performed. Care everywhere utilized where appropriate.    Thank you for allowing me to participate in the care of this patient.  I hope this information is helpful.      Zara Mccloud MD [facility], [facility]  5/6/2023  3:32 PM  This document was generated utilizing voice recognition technology. Please excuse any typographical errors which may be present.

## 2023-05-26 ENCOUNTER — TELEPHONE (OUTPATIENT)
Dept: SCHEDULING | Facility: CLINIC | Age: 58
End: 2023-05-26
Payer: COMMERCIAL

## 2023-06-10 DIAGNOSIS — I73.9 PERIPHERAL VASCULAR DISEASE (CMS/HCC): ICD-10-CM

## 2023-06-10 DIAGNOSIS — I25.10 CAD IN NATIVE ARTERY: ICD-10-CM

## 2023-06-11 RX ORDER — GLIPIZIDE 5 MG/1
5 TABLET, FILM COATED, EXTENDED RELEASE ORAL DAILY
Qty: 90 TABLET | Refills: 3 | Status: SHIPPED | OUTPATIENT
Start: 2023-06-11 | End: 2024-07-22

## 2023-06-11 NOTE — TELEPHONE ENCOUNTER
Medicine Refill Request    Last Office: 3/30/2023   Last Consult Visit: Visit date not found  Last Telemedicine Visit: 4/14/2022 Nora Gudino, DO    Next Appointment: 9/29/2023      Current Outpatient Medications:   •  aspirin 81 mg enteric coated tablet, Take 81 mg by mouth daily., Disp: , Rfl:   •  atorvastatin (LIPITOR) 80 mg tablet, Take 1 tablet (80 mg total) by mouth once daily., Disp: 90 tablet, Rfl: 3  •  blood sugar diagnostic strip, Use on edaily, Disp: 100 strip, Rfl: 6  •  ferrous sulfate 134 mg (27 mg iron) tablet, Take 1 tablet by mouth daily with breakfast.  , Disp: , Rfl: 6  •  glipiZIDE (GLUCOTROL XL) 5 mg 24 hr tablet, TAKE ONE TABLET BY MOUTH DAILY, Disp: 90 tablet, Rfl: 3  •  JARDIANCE 25 mg tablet, take 1 tablet by mouth every day, Disp: 90 tablet, Rfl: 3  •  lancets 21 gauge misc, 1 lancet daily., Disp: 100 each, Rfl: 6  •  lisinopriL (PRINIVIL) 10 mg tablet, Take 1 tablet (10 mg total) by mouth once daily., Disp: 90 tablet, Rfl: 3  •  metFORMIN XR (GLUCOPHAGE-XR) 750 mg 24 hr tablet, Take two daily, Disp: 180 tablet, Rfl: 3  •  metoprolol succinate XL (TOPROL-XL) 100 mg 24 hr tablet, Take 1 tablet (100 mg total) by mouth once daily., Disp: 90 tablet, Rfl: 3  •  nitroglycerin (NITROSTAT) 0.4 mg SL tablet, Place 1 tablet (0.4 mg total) under the tongue every 5 (five) minutes as needed for chest pain., Disp: 25 tablet, Rfl: 3  •  rivaroxaban (XARELTO) 2.5 mg tablet tablet, Take 1 tablet (2.5 mg total) by mouth 2 (two) times a day., Disp: 180 tablet, Rfl: 3      BP Readings from Last 3 Encounters:   05/02/23 124/70   03/30/23 117/70   01/09/23 124/76       Recent Lab results:  Lab Results   Component Value Date    CHOL 115 11/24/2021   ,   Lab Results   Component Value Date    HDL 35 (L) 11/24/2021   ,   Lab Results   Component Value Date    LDLCALC 57 11/24/2021   ,   Lab Results   Component Value Date    TRIG 130 11/24/2021        Lab Results   Component Value Date    GLUCOSE 111 (H)  11/24/2021   ,   Lab Results   Component Value Date    HGBA1C 6.7 (H) 11/24/2021         Lab Results   Component Value Date    CREATININE 0.51 (L) 11/24/2021       Lab Results   Component Value Date    TSH 2.420 06/15/2021

## 2023-06-27 ENCOUNTER — TELEPHONE (OUTPATIENT)
Dept: PRIMARY CARE | Facility: CLINIC | Age: 58
End: 2023-06-27
Payer: COMMERCIAL

## 2023-06-27 NOTE — TELEPHONE ENCOUNTER
Pt was called to schedule an appointment to complete forms. Pt stated she already spoke with someone and scheduled an appointment.

## 2023-06-28 ENCOUNTER — OFFICE VISIT (OUTPATIENT)
Dept: PRIMARY CARE | Facility: CLINIC | Age: 58
End: 2023-06-28
Payer: COMMERCIAL

## 2023-06-28 ENCOUNTER — HOSPITAL ENCOUNTER (OUTPATIENT)
Dept: CARDIOLOGY | Facility: CLINIC | Age: 58
Discharge: HOME | End: 2023-06-28
Attending: PHYSICIAN ASSISTANT
Payer: COMMERCIAL

## 2023-06-28 ENCOUNTER — OFFICE VISIT (OUTPATIENT)
Dept: VASCULAR SURGERY | Facility: CLINIC | Age: 58
End: 2023-06-28
Payer: COMMERCIAL

## 2023-06-28 VITALS — WEIGHT: 234 LBS | BODY MASS INDEX: 39.95 KG/M2 | HEIGHT: 64 IN

## 2023-06-28 VITALS
TEMPERATURE: 97.9 F | HEART RATE: 72 BPM | HEIGHT: 64 IN | SYSTOLIC BLOOD PRESSURE: 110 MMHG | WEIGHT: 239 LBS | RESPIRATION RATE: 15 BRPM | DIASTOLIC BLOOD PRESSURE: 60 MMHG | BODY MASS INDEX: 40.8 KG/M2 | OXYGEN SATURATION: 95 %

## 2023-06-28 DIAGNOSIS — E66.813 CLASS 3 SEVERE OBESITY DUE TO EXCESS CALORIES WITH SERIOUS COMORBIDITY AND BODY MASS INDEX (BMI) OF 40.0 TO 44.9 IN ADULT (CMS/HCC): ICD-10-CM

## 2023-06-28 DIAGNOSIS — E11.9 TYPE 2 DIABETES MELLITUS WITHOUT COMPLICATION, WITHOUT LONG-TERM CURRENT USE OF INSULIN (CMS/HCC): ICD-10-CM

## 2023-06-28 DIAGNOSIS — E66.01 CLASS 3 SEVERE OBESITY DUE TO EXCESS CALORIES WITH SERIOUS COMORBIDITY AND BODY MASS INDEX (BMI) OF 40.0 TO 44.9 IN ADULT (CMS/HCC): ICD-10-CM

## 2023-06-28 DIAGNOSIS — Z00.00 PREVENTATIVE HEALTH CARE: Primary | ICD-10-CM

## 2023-06-28 DIAGNOSIS — Z23 NEED FOR SHINGLES VACCINE: ICD-10-CM

## 2023-06-28 DIAGNOSIS — Z02.89 EXAMINATION, PHYSICAL, EMPLOYEE: ICD-10-CM

## 2023-06-28 DIAGNOSIS — Z11.59 NEED FOR HEPATITIS B SCREENING TEST: ICD-10-CM

## 2023-06-28 DIAGNOSIS — I73.9 CLAUDICATION (CMS/HCC): ICD-10-CM

## 2023-06-28 DIAGNOSIS — E61.1 IRON DEFICIENCY: ICD-10-CM

## 2023-06-28 DIAGNOSIS — I73.9 PERIPHERAL VASCULAR DISEASE (CMS/HCC): Primary | ICD-10-CM

## 2023-06-28 DIAGNOSIS — Z12.11 COLON CANCER SCREENING: ICD-10-CM

## 2023-06-28 PROCEDURE — 3008F BODY MASS INDEX DOCD: CPT | Performed by: INTERNAL MEDICINE

## 2023-06-28 PROCEDURE — 3078F DIAST BP <80 MM HG: CPT | Performed by: INTERNAL MEDICINE

## 2023-06-28 PROCEDURE — 99214 OFFICE O/P EST MOD 30 MIN: CPT | Performed by: SURGERY

## 2023-06-28 PROCEDURE — 3074F SYST BP LT 130 MM HG: CPT | Performed by: INTERNAL MEDICINE

## 2023-06-28 PROCEDURE — 99396 PREV VISIT EST AGE 40-64: CPT | Performed by: INTERNAL MEDICINE

## 2023-06-28 PROCEDURE — 93923 UPR/LXTR ART STDY 3+ LVLS: CPT | Performed by: SURGERY

## 2023-06-28 ASSESSMENT — PATIENT HEALTH QUESTIONNAIRE - PHQ9: SUM OF ALL RESPONSES TO PHQ9 QUESTIONS 1 & 2: 0

## 2023-06-28 ASSESSMENT — PAIN SCALES - GENERAL: PAINLEVEL: 0-NO PAIN

## 2023-06-28 NOTE — PROGRESS NOTES
[facility]  Follow-up Office Note  @ [facility]        DETAILS OF CONSULTATION   2023  Mirela Lopez               YOB: 1965  [id number]    Consulting Service:  [facility] IN [facility]    PCP:  Nora Gudino DO    Diagnosis:  PAD     HISTORY OF PRESENT ILLNESS        Mirela Lopez  is a 58 y.o. female returning to the office for continued management of PAD, Mirela continues to do well.  She has remote history of iliac stenting.  She had noninvasive testing performed today her ankle indices were within normal ranges.  She has no recurrent claudication symptoms to report.    She does continue on Lipitor and Xarelto low-dose, and baby aspirin.    She is here for routine vascular check, has no significant complaints today.    PAST MEDICAL AND SURGICAL HISTORY        Past Medical History:   Diagnosis Date   • GERD (gastroesophageal reflux disease)    • Hyperlipidemia    • Peripheral vascular disease (CMS/HCC) 2017    Right iliac claudication.  Aortogram demonstrated near occlusion of the right common iliac.  Successful right iliac stent 2017       Past Surgical History:   Procedure Laterality Date   • CARDIAC CATHETERIZATION      LAD tortuous, no significant disease.  Dominant RCA-patent.  90% proximal cx, 95% mid cx and distal thrombus   • CARPAL TUNNEL RELEASE     •  SECTION  2003   • CORONARY STENT PLACEMENT  2017    NERI ×2 left circumflex-performed in [place]   • ILIAC ARTERY STENT  10/2017    Dr. Ugalde   • WISDOM TOOTH EXTRACTION         MEDICATIONS        Current Outpatient Medications on File Prior to Visit   Medication Sig Dispense Refill   • aspirin 81 mg enteric coated tablet Take 81 mg by mouth daily.     • atorvastatin (LIPITOR) 80 mg tablet Take 1 tablet (80 mg total) by mouth once daily. 90 tablet 3   • blood sugar diagnostic strip Use on edaily 100 strip 6   • ferrous sulfate 134 mg (27 mg  iron) tablet Take 1 tablet by mouth daily with breakfast.    6   • glipiZIDE (GLUCOTROL XL) 5 mg 24 hr tablet Take 1 tablet (5 mg total) by mouth daily. 90 tablet 3   • JARDIANCE 25 mg tablet take 1 tablet by mouth every day 90 tablet 3   • lancets 21 gauge misc 1 lancet daily. 100 each 6   • lisinopriL (PRINIVIL) 10 mg tablet Take 1 tablet (10 mg total) by mouth once daily. 90 tablet 3   • metFORMIN XR (GLUCOPHAGE-XR) 750 mg 24 hr tablet Take two daily 180 tablet 3   • metoprolol succinate XL (TOPROL-XL) 100 mg 24 hr tablet Take 1 tablet (100 mg total) by mouth once daily. 90 tablet 3   • nitroglycerin (NITROSTAT) 0.4 mg SL tablet Place 1 tablet (0.4 mg total) under the tongue every 5 (five) minutes as needed for chest pain. 25 tablet 3   • rivaroxaban (XARELTO) 2.5 mg tablet tablet Take 1 tablet (2.5 mg total) by mouth 2 (two) times a day. 180 tablet 3     No current facility-administered medications on file prior to visit.       ALLERGIES        Adhesive     PHYSICAL EXAMINATION      There were no vitals taken for this visit.  There is no height or weight on file to calculate BMI.      Physical Exam  Vitals reviewed.   Constitutional:       Appearance: She is well-developed.   HENT:      Head: Normocephalic and atraumatic.   Eyes:      Pupils: Pupils are equal, round, and reactive to light.   Neck:      Trachea: No tracheal deviation.   Cardiovascular:      Rate and Rhythm: Normal rate.      Pulses:           Dorsalis pedis pulses are 2+ on the right side and 2+ on the left side.   Pulmonary:      Effort: Pulmonary effort is normal.      Breath sounds: Normal breath sounds.   Abdominal:      Palpations: Abdomen is soft. There is no mass.   Musculoskeletal:         General: Normal range of motion.      Cervical back: Normal range of motion and neck supple.   Skin:     General: Skin is warm and dry.   Neurological:      Mental Status: She is alert and oriented to person, place, and time.         LABS / IMAGING /  EKG        Imaging  I have independently reviewed the pertinent imaging from the last 24 hrs. and Significant findings include:     PVR performed today June 20, 2023    Study Findings and Details    Study Details Non-invasive testing of the lower extremities including Doppler, pulse volume recordings and segmental limb pressures.        Vascular Findings    PVR Resting The right, low thigh PVR waveforms demonstrate a loss in amplitude and no dicrotic notch. The right calf PVR waveforms demonstrate a loss in amplitude and no dicrotic notch. The right ankle PVR waveforms demonstrate a loss in amplitude and no dicrotic notch. The right foot PVR waveforms demonstrate a loss in amplitude and no dicrotic notch. The right digit PVR waveforms demonstrate a loss in amplitude and no dicrotic notch. The left, low thigh PVR waveforms demonstrate a loss in amplitude and no dicrotic notch. The left calf PVR waveforms demonstrate a loss in amplitude and no dicrotic notch. The left ankle PVR waveforms demonstrate a loss in amplitude and no dicrotic notch. The left foot PVR waveforms demonstrate a loss in amplitude and no dicrotic notch. The left digit PVR waveforms demonstrate a loss in amplitude and no dicrotic notch.   Resting Study Conclusions:       Right resting JOURDAN of 1.25.        Left resting JOURDAN of 1.08.        Resting Study Measurements     R Pressure R Index L Pressure L Index   Arm 130 mmHg        - 118 mmHg        -   Low Thigh 185 mmHg        1.42        137 mmHg        1.05          Proximal Calf 162 mmHg        1.25        119 mmHg        0.92          Posterior Tibial 162 mmHg        1.25        141 mmHg        1.08          Dorsalis Pedis 135 mmHg        1.04        124 mmHg        0.95          JOURDAN - 1.25        - 1.08             Toe Brachial Index (TBI) Measurements     R Pressure R Index L Pressure L Index   1st Toe 102 mmHg        0.78        113 mmHg        0.87          TBI  0.78         0.87                      ASSESSMENT AND PLAN         Problem List Items Addressed This Visit        Circulatory    Peripheral vascular disease (CMS/HCC) - Primary    Overview     PAD status post iliac stenting in 2017.  Since that time has been doing well asymptomatic from a peripheral arterial standpoint.         Current Assessment & Plan     We will plan to see her again in 1 year with a repeat PVR.  Continuing optimal vascular medical therapy.         Relevant Orders    Ultrasound PVR lower extremity          Return in about 1 year (around 6/28/2024) for To review ordered studies, Recheck.     Sandip Ugalde MD, FACS        Vascular and Endovascular Specialist  [place] [address]  Phone [phone]  Fax  [phone]     Thank you very much for allowing us to participate in the care of your patient.  Please not hesitate to call or email if there are any questions.  Sincerely.    This document was generated utilizing voice recognition technology. An attempt at proofreading has been made to minimize errors but typographical errors may be present.

## 2023-06-28 NOTE — ASSESSMENT & PLAN NOTE
We will plan to see her again in 1 year with a repeat PVR.  Continuing optimal vascular medical therapy.

## 2023-06-28 NOTE — PROGRESS NOTES
"    Subjective      Patient ID: Mirela Lopez is a 58 y.o. female.  1965      Physical Exam/Work PE    BMI 41    Depression screening today negative    GYN 1/9/2023 negative PAP Sees Dr Kunz  Mammogram 1/9/2023   Colonoscopy refuses ok cologuard     Podiatric 3/14/2023  Diabetic retinal exam     Vaccine status:    PNA completed  Shingles pt wishes to check w insurance   COVID 19 Caught up           The following have been reviewed and updated as appropriate in this visit:   Problems       Review of Systems   All other systems reviewed and are negative.      Objective     Vitals:    06/28/23 1341   BP: 110/60   BP Location: Right upper arm   Patient Position: Sitting   Pulse: 72   Resp: 15   Temp: 36.6 °C (97.9 °F)   TempSrc: Temporal   SpO2: 95%   Weight: 108 kg (239 lb)   Height: 1.626 m (5' 4\")     Body mass index is 41.02 kg/m².    Physical Exam  Vitals reviewed.   Constitutional:       Appearance: Normal appearance. She is well-developed. She is obese.   HENT:      Head: Normocephalic and atraumatic.      Right Ear: Tympanic membrane, ear canal and external ear normal.      Left Ear: Tympanic membrane, ear canal and external ear normal.      Mouth/Throat:      Mouth: Mucous membranes are moist.      Pharynx: Oropharynx is clear.   Cardiovascular:      Rate and Rhythm: Normal rate and regular rhythm.      Heart sounds: Normal heart sounds.   Pulmonary:      Effort: Pulmonary effort is normal.      Breath sounds: Normal breath sounds.   Abdominal:      General: Abdomen is flat. Bowel sounds are normal.   Musculoskeletal:         General: No swelling.      Cervical back: Normal range of motion and neck supple.   Skin:     General: Skin is warm and dry.   Neurological:      General: No focal deficit present.      Mental Status: She is alert and oriented to person, place, and time.   Psychiatric:         Mood and Affect: Mood normal.         Behavior: Behavior normal.         Thought Content: Thought content " normal.         Judgment: Judgment normal.         Assessment/Plan   Diagnoses and all orders for this visit:    Preventative health care (Primary)  -     CBC; Future  -     Comprehensive metabolic panel; Future  -     Lipid panel; Future  -     Urinalysis with microscopic; Future  -     Shingrix  -     Hepatitis B surface antibody; Future    Iron deficiency  -     CBC; Future  -     Ferritin; Future  -     Iron and TIBC; Future    Type 2 diabetes mellitus without complication, without long-term current use of insulin (CMS/HCC)  -     Microalbumin / creatinine urine ratio; Future  -     Hemoglobin A1c; Future    Class 3 severe obesity due to excess calories with serious comorbidity and body mass index (BMI) of 40.0 to 44.9 in adult (CMS/HCC)  -     Comprehensive metabolic panel; Future  -     Lipid panel; Future  -     Hemoglobin A1c; Future  -     TSH w reflex FT4; Future    Examination, physical, employee  -     QuantiFERON-TB; Future      Need for hepatitis B screening test  -     Hepatitis B surface antibody; Future    Colon Cancer Screening  FIT test

## 2023-06-28 NOTE — LETTER
2023     Nora Gudino DO  [address] [facility] [address]    Patient: Mirela Lopez  YOB: 1965  Date of Visit: 2023      Dear Dr. Gudino:    Thank you for referring Mirela Lopez to me for evaluation. Below are my notes for this consultation.    If you have questions, please do not hesitate to call me. I look forward to following your patient along with you.         Sincerely,        Sandip Ugalde MD        CC: No Recipients    Sandip Ugalde MD  2023  4:19 PM  Signed       [facility]  Follow-up Office Note  @ [facility]        DETAILS OF CONSULTATION   2023  Mirela Lopez               YOB: 1965  [id number]    Consulting Service:  [facility] IN [facility]    PCP:  Nora Gudino DO    Diagnosis:  PAD     HISTORY OF PRESENT ILLNESS        Mirela Lopez  is a 58 y.o. female returning to the office for continued management of PAD, Mirela continues to do well.  She has remote history of iliac stenting.  She had noninvasive testing performed today her ankle indices were within normal ranges.  She has no recurrent claudication symptoms to report.    She does continue on Lipitor and Xarelto low-dose, and baby aspirin.    She is here for routine vascular check, has no significant complaints today.    PAST MEDICAL AND SURGICAL HISTORY        Past Medical History:   Diagnosis Date   • GERD (gastroesophageal reflux disease)    • Hyperlipidemia    • Peripheral vascular disease (CMS/HCC)     Right iliac claudication.  Aortogram demonstrated near occlusion of the right common iliac.  Successful right iliac stent 2017       Past Surgical History:   Procedure Laterality Date   • CARDIAC CATHETERIZATION      LAD tortuous, no significant disease.  Dominant RCA-patent.  90% proximal cx, 95% mid cx and distal thrombus   • CARPAL TUNNEL RELEASE     •  SECTION  2003   • CORONARY  STENT PLACEMENT  2017    NERI ×2 left circumflex-performed in [place]   • ILIAC ARTERY STENT  10/2017    Dr. Ugalde   • WISDOM TOOTH EXTRACTION  1989       MEDICATIONS        Current Outpatient Medications on File Prior to Visit   Medication Sig Dispense Refill   • aspirin 81 mg enteric coated tablet Take 81 mg by mouth daily.     • atorvastatin (LIPITOR) 80 mg tablet Take 1 tablet (80 mg total) by mouth once daily. 90 tablet 3   • blood sugar diagnostic strip Use on edaily 100 strip 6   • ferrous sulfate 134 mg (27 mg iron) tablet Take 1 tablet by mouth daily with breakfast.    6   • glipiZIDE (GLUCOTROL XL) 5 mg 24 hr tablet Take 1 tablet (5 mg total) by mouth daily. 90 tablet 3   • JARDIANCE 25 mg tablet take 1 tablet by mouth every day 90 tablet 3   • lancets 21 gauge misc 1 lancet daily. 100 each 6   • lisinopriL (PRINIVIL) 10 mg tablet Take 1 tablet (10 mg total) by mouth once daily. 90 tablet 3   • metFORMIN XR (GLUCOPHAGE-XR) 750 mg 24 hr tablet Take two daily 180 tablet 3   • metoprolol succinate XL (TOPROL-XL) 100 mg 24 hr tablet Take 1 tablet (100 mg total) by mouth once daily. 90 tablet 3   • nitroglycerin (NITROSTAT) 0.4 mg SL tablet Place 1 tablet (0.4 mg total) under the tongue every 5 (five) minutes as needed for chest pain. 25 tablet 3   • rivaroxaban (XARELTO) 2.5 mg tablet tablet Take 1 tablet (2.5 mg total) by mouth 2 (two) times a day. 180 tablet 3     No current facility-administered medications on file prior to visit.       ALLERGIES        Adhesive     PHYSICAL EXAMINATION      There were no vitals taken for this visit.  There is no height or weight on file to calculate BMI.      Physical Exam  Vitals reviewed.   Constitutional:       Appearance: She is well-developed.   HENT:      Head: Normocephalic and atraumatic.   Eyes:      Pupils: Pupils are equal, round, and reactive to light.   Neck:      Trachea: No tracheal deviation.   Cardiovascular:      Rate and Rhythm: Normal rate.       Pulses:           Dorsalis pedis pulses are 2+ on the right side and 2+ on the left side.   Pulmonary:      Effort: Pulmonary effort is normal.      Breath sounds: Normal breath sounds.   Abdominal:      Palpations: Abdomen is soft. There is no mass.   Musculoskeletal:         General: Normal range of motion.      Cervical back: Normal range of motion and neck supple.   Skin:     General: Skin is warm and dry.   Neurological:      Mental Status: She is alert and oriented to person, place, and time.         LABS / IMAGING / EKG        Imaging  I have independently reviewed the pertinent imaging from the last 24 hrs. and Significant findings include:     PVR performed today June 20, 2023    Study Findings and Details    Study Details Non-invasive testing of the lower extremities including Doppler, pulse volume recordings and segmental limb pressures.        Vascular Findings    PVR Resting The right, low thigh PVR waveforms demonstrate a loss in amplitude and no dicrotic notch. The right calf PVR waveforms demonstrate a loss in amplitude and no dicrotic notch. The right ankle PVR waveforms demonstrate a loss in amplitude and no dicrotic notch. The right foot PVR waveforms demonstrate a loss in amplitude and no dicrotic notch. The right digit PVR waveforms demonstrate a loss in amplitude and no dicrotic notch. The left, low thigh PVR waveforms demonstrate a loss in amplitude and no dicrotic notch. The left calf PVR waveforms demonstrate a loss in amplitude and no dicrotic notch. The left ankle PVR waveforms demonstrate a loss in amplitude and no dicrotic notch. The left foot PVR waveforms demonstrate a loss in amplitude and no dicrotic notch. The left digit PVR waveforms demonstrate a loss in amplitude and no dicrotic notch.   Resting Study Conclusions:       Right resting JOURDAN of 1.25.        Left resting JOURDAN of 1.08.        Resting Study Measurements     R Pressure R Index L Pressure L Index   Arm 130 mmHg        -  118 mmHg        -   Low Thigh 185 mmHg        1.42        137 mmHg        1.05          Proximal Calf 162 mmHg        1.25        119 mmHg        0.92          Posterior Tibial 162 mmHg        1.25        141 mmHg        1.08          Dorsalis Pedis 135 mmHg        1.04        124 mmHg        0.95          JOURDAN - 1.25        - 1.08             Toe Brachial Index (TBI) Measurements     R Pressure R Index L Pressure L Index   1st Toe 102 mmHg        0.78        113 mmHg        0.87          TBI  0.78         0.87                     ASSESSMENT AND PLAN         Problem List Items Addressed This Visit        Circulatory    Peripheral vascular disease (CMS/HCC) - Primary    Overview     PAD status post iliac stenting in 2017.  Since that time has been doing well asymptomatic from a peripheral arterial standpoint.         Current Assessment & Plan     We will plan to see her again in 1 year with a repeat PVR.  Continuing optimal vascular medical therapy.         Relevant Orders    Ultrasound PVR lower extremity          Return in about 1 year (around 6/28/2024) for To review ordered studies, Recheck.     Sandip Ugalde MD, FACS        Vascular and Endovascular Specialist  [place] [address]  Phone [phone]  Fax  [phone]     Thank you very much for allowing us to participate in the care of your patient.  Please not hesitate to call or email if there are any questions.  Sincerely.    This document was generated utilizing voice recognition technology. An attempt at proofreading has been made to minimize errors but typographical errors may be present.

## 2023-06-29 LAB
BSA FOR ECHO PROCEDURE: 2.19 M2
LEFT 1ST TOE INDEX: 0.87
LEFT 1ST TOE: 113 MMHG
LEFT ABI: 1.08
LEFT ARM BP: 118 MMHG
LEFT DORSALIS PEDIS INDEX: 0.95
LEFT DORSALIS PEDIS: 124 MMHG
LEFT LOW THIGH INDEX: 1.05
LEFT LOW THIGH: 137 MMHG
LEFT POST TIBIAL INDEX: 1.08
LEFT POSTERIOR TIBIAL: 141 MMHG
LEFT PROXIMAL CALF INDEX: 0.92
LEFT PROXIMAL CALF: 119 MMHG
LEFT TBI: 0.87
RIGHT 1ST TOE INDEX: 0.78
RIGHT 1ST TOE: 102 MMHG
RIGHT ABI: 1.25
RIGHT ARM BP: 130 MMHG
RIGHT DORSALIS PEDIS INDEX: 1.04
RIGHT DORSALIS PEDIS: 135 MMHG
RIGHT LOW THIGH INDEX: 1.42
RIGHT LOW THIGH: 185 MMHG
RIGHT POST TIBIAL INDEX: 1.25
RIGHT POSTERIOR TIBIAL: 162 MMHG
RIGHT PROXIMAL CALF INDEX: 1.25
RIGHT PROXIMAL CALF: 162 MMHG
RIGHT TBI: 0.78

## 2023-07-05 ENCOUNTER — HOSPITAL ENCOUNTER (OUTPATIENT)
Dept: CARDIOLOGY | Facility: CLINIC | Age: 58
Setting detail: NUCLEAR MEDICINE
Discharge: HOME | End: 2023-07-05
Attending: INTERNAL MEDICINE
Payer: COMMERCIAL

## 2023-07-05 ENCOUNTER — APPOINTMENT (OUTPATIENT)
Dept: CARDIOLOGY | Facility: CLINIC | Age: 58
End: 2023-07-05
Attending: INTERNAL MEDICINE
Payer: COMMERCIAL

## 2023-07-05 VITALS
WEIGHT: 239 LBS | SYSTOLIC BLOOD PRESSURE: 124 MMHG | DIASTOLIC BLOOD PRESSURE: 70 MMHG | HEIGHT: 64 IN | BODY MASS INDEX: 40.8 KG/M2

## 2023-07-05 DIAGNOSIS — I25.10 CAD IN NATIVE ARTERY: ICD-10-CM

## 2023-07-05 DIAGNOSIS — R94.31 ABNORMAL EKG: ICD-10-CM

## 2023-07-05 DIAGNOSIS — I73.9 PERIPHERAL VASCULAR DISEASE (CMS/HCC): ICD-10-CM

## 2023-07-05 PROCEDURE — 93306 TTE W/DOPPLER COMPLETE: CPT | Performed by: INTERNAL MEDICINE

## 2023-07-07 LAB
AORTIC ROOT ANNULUS: 2.9 CM
ASCENDING AORTA: 2.9 CM
AV PEAK GRADIENT: 9 MMHG
AV PEAK VELOCITY-S: 1.47 M/S
AV VALVE AREA: 1.58 CM2
BSA FOR ECHO PROCEDURE: 2.21 M2
CUSP SEPARATION: 1.6 CM
E WAVE DECELERATION TIME: 240 MS
E/A RATIO: 1
E/E' RATIO: 15.3
E/LAT E' RATIO: 11.2
EDV (BP): 76.9 CM3
EF (A4C): 60.7 %
EF A2C: 61.4 %
EJECTION FRACTION: 60.5 %
ESV (BP): 30.4 CM3
FRACTIONAL SHORTENING: 34.24 %
HEART RATE: 84 BPM
INTERVENTRICULAR SEPTUM: 1.2 CM
LA ESV (BP): 27.7 CM3
LA ESV INDEX (A2C): 22.81 CM3/M2
LA ESV INDEX (BP): 12.53 CM3/M2
LA/AORTA RATIO: 1.14
LAAS-AP2: 11 CM2
LAAS-AP4: 13.7 CM2
LAD 2D: 3.3 CM
LALD A4C: 4.35 CM
LALD A4C: 5.39 CM
LAV-S: 50.4 CM3
LEFT ATRIUM VOLUME INDEX: 26.61 CM3/M2
LEFT ATRIUM VOLUME: 58.8 CM3
LEFT INTERNAL DIMENSION IN SYSTOLE: 3.13 CM (ref 3.28–4.97)
LEFT VENTRICLE DIASTOLIC VOLUME INDEX: 36.83 CM3/M2
LEFT VENTRICLE DIASTOLIC VOLUME: 81.4 CM3
LEFT VENTRICLE SYSTOLIC VOLUME INDEX: 14.48 CM3/M2
LEFT VENTRICLE SYSTOLIC VOLUME: 32 CM3
LEFT VENTRICULAR INTERNAL DIMENSION IN DIASTOLE: 4.76 CM (ref 5.61–7.79)
LEFT VENTRICULAR POSTERIOR WALL IN END DIASTOLE: 1.19 CM (ref 0.71–1.32)
LV DIASTOLIC VOLUME: 72.6 CM3
LV ESV (APICAL 2 CHAMBER): 28 CM3
LVAD-AP2: 26.5 CM2
LVAD-AP4: 27.8 CM2
LVAS-AP2: 15.3 CM2
LVAS-AP4: 15.9 CM2
LVEDVI(A2C): 32.85 CM3/M2
LVEDVI(BP): 34.8 CM3/M2
LVESVI(A2C): 12.67 CM3/M2
LVESVI(BP): 13.76 CM3/M2
LVLD-AP2: 8.11 CM
LVLD-AP4: 8.06 CM
LVLS-AP2: 7.04 CM
LVLS-AP4: 6.82 CM
LVOT 2D: 2 CM
LVOT A: 3.14 CM2
LVOT PEAK VELOCITY: 0.94 M/S
LVOT PG: 4 MMHG
MLH CV ECHO AVA INDEX VELOCITY RATIO: 0.7
MV E'TISSUE VEL-LAT: 0.09 M/S
MV E'TISSUE VEL-MED: 0.06 M/S
MV PEAK A VEL: 0.91 M/S
MV PEAK E VEL: 0.95 M/S
POSTERIOR WALL: 1.19 CM
RVOT VMAX: 0.63 M/S
SEPTAL TISSUE DOPPLER FREE WALL LATE DIA VELOCITY (APICAL 4 CHAMBER VIEW): 0.08 M/S
Z-SCORE OF LEFT VENTRICULAR DIMENSION IN END DIASTOLE: -3.29
Z-SCORE OF LEFT VENTRICULAR DIMENSION IN END SYSTOLE: -2.01
Z-SCORE OF LEFT VENTRICULAR POSTERIOR WALL IN END DIASTOLE: 1.08

## 2023-07-13 ENCOUNTER — TELEPHONE (OUTPATIENT)
Dept: PRIMARY CARE | Facility: CLINIC | Age: 58
End: 2023-07-13
Payer: COMMERCIAL

## 2023-07-13 LAB
ALBUMIN SERPL-MCNC: 4.2 G/DL (ref 3.8–4.9)
ALBUMIN/CREAT UR: <17 MG/G CREAT (ref 0–29)
ALBUMIN/GLOB SERPL: 1.3 {RATIO} (ref 1.2–2.2)
ALP SERPL-CCNC: 89 IU/L (ref 44–121)
ALT SERPL-CCNC: 37 IU/L (ref 0–32)
AST SERPL-CCNC: 36 IU/L (ref 0–40)
BILIRUB SERPL-MCNC: 0.4 MG/DL (ref 0–1.2)
BUN SERPL-MCNC: 10 MG/DL (ref 6–24)
BUN/CREAT SERPL: 18 (ref 9–23)
CALCIUM SERPL-MCNC: 9.6 MG/DL (ref 8.7–10.2)
CHLORIDE SERPL-SCNC: 103 MMOL/L (ref 96–106)
CHOLEST SERPL-MCNC: 151 MG/DL (ref 100–199)
CO2 SERPL-SCNC: 21 MMOL/L (ref 20–29)
CREAT SERPL-MCNC: 0.56 MG/DL (ref 0.57–1)
CREAT UR-MCNC: 17.3 MG/DL
EGFRCR SERPLBLD CKD-EPI 2021: 107 ML/MIN/1.73
ERYTHROCYTE [DISTWIDTH] IN BLOOD BY AUTOMATED COUNT: 13.7 % (ref 11.7–15.4)
FERRITIN SERPL-MCNC: 212 NG/ML (ref 15–150)
GLOBULIN SER CALC-MCNC: 3.2 G/DL (ref 1.5–4.5)
GLUCOSE SERPL-MCNC: 162 MG/DL (ref 70–99)
HBA1C MFR BLD: 8.3 % (ref 4.8–5.6)
HCT VFR BLD AUTO: 36.7 % (ref 34–46.6)
HDLC SERPL-MCNC: 43 MG/DL
HGB BLD-MCNC: 12.7 G/DL (ref 11.1–15.9)
IRON SATN MFR SERPL: 19 % (ref 15–55)
IRON SERPL-MCNC: 73 UG/DL (ref 27–159)
LDLC SERPL CALC-MCNC: 80 MG/DL (ref 0–99)
MCH RBC QN AUTO: 30.3 PG (ref 26.6–33)
MCHC RBC AUTO-ENTMCNC: 34.6 G/DL (ref 31.5–35.7)
MCV RBC AUTO: 88 FL (ref 79–97)
MICROALBUMIN UR-MCNC: <3 UG/ML
PLATELET # BLD AUTO: 187 X10E3/UL (ref 150–450)
POTASSIUM SERPL-SCNC: 4.1 MMOL/L (ref 3.5–5.2)
PROT SERPL-MCNC: 7.4 G/DL (ref 6–8.5)
RBC # BLD AUTO: 4.19 X10E6/UL (ref 3.77–5.28)
SODIUM SERPL-SCNC: 142 MMOL/L (ref 134–144)
SPECIMEN STATUS: NORMAL
T4 FREE SERPL-MCNC: 1.34 NG/DL (ref 0.82–1.77)
TIBC SERPL-MCNC: 376 UG/DL (ref 250–450)
TRIGL SERPL-MCNC: 162 MG/DL (ref 0–149)
TSH SERPL DL<=0.005 MIU/L-ACNC: 1.54 UIU/ML (ref 0.45–4.5)
UIBC SERPL-MCNC: 303 UG/DL (ref 131–425)
VLDLC SERPL CALC-MCNC: 28 MG/DL (ref 5–40)
WBC # BLD AUTO: 6.2 X10E3/UL (ref 3.4–10.8)

## 2023-07-13 NOTE — TELEPHONE ENCOUNTER
----- Message from Karen Goldberg MA sent at 7/13/2023  9:39 AM EDT -----  Regarding: FW: TB TEst EMployment form  Contact: [phone]        ----- Message -----  From: Mirela Lopez  Sent: 7/13/2023   9:34 AM EDT  To: [facility] Clinical Support Pool  Subject: TB TEst EMployment form                          Saw my test results. I know they are a mess and I am back on th e wagon to get things where they need to be.     Can you let me know when the physical form I left with you will be completed so that I can pick it up? Also, which results are showing the TB test? I need to submit that to the employer    Thank you  Mirela Lopez

## 2023-07-13 NOTE — TELEPHONE ENCOUNTER
Spoke with pt and advised her that she didn't provide the lab with the proper paperwork to have the testing she's inquiring about completed. I advised pt that I was able to have one the test added to the specimen that the lab had on hand but that she would need to go back to have the other test completed. Pt was advise that lab req was located on my chart.

## 2023-07-13 NOTE — TELEPHONE ENCOUNTER
Pt was called and informed that test results take a few days and when they come back her physical form will be complete and a message will be sent via [employer].

## 2023-07-13 NOTE — TELEPHONE ENCOUNTER
Team, can someone give the lab a call? We are missing a Hep b surface antibody and a TB quant gold.  The lab is commenting that all the studies are resulted and patient has already contacted us about this matter as it pertains to work I am hoping they hansa the specimen but if they did we will sent her back

## 2023-07-13 NOTE — TELEPHONE ENCOUNTER
Please call pt - unfortunately she just went for labs  and they take a few days to get back.  When they are all resulted the form will be complete and we will send a [employer] message

## 2023-07-13 NOTE — TELEPHONE ENCOUNTER
Just spoke with Lab. Pt gave lab specimen orders that were from March. Those orders did not include the Hep B or the   Tb Quant. I was able to have them add the Hep B testing but pt will need to go back for the Tb testing.   Will contact pt and advise to go back for the Tb testing..

## 2023-07-13 NOTE — TELEPHONE ENCOUNTER
Pt was called and informed that test results take a few days and when they come back her physical form will be complete and a message will be sent via [employer].

## 2023-07-13 NOTE — TELEPHONE ENCOUNTER
Spoke with Lab. Pt gave lab specimen orders that were from March. Those orders did not include the Hep B or the   Tb Quant. I was able to have them add the Hep B testing but pt will need to go back for the Tb testing.   Will contact pt and advise to go back for the Tb testing..

## 2023-07-14 LAB
HBV SURFACE AB SER-ACNC: <3.1 MIU/ML
SPECIMEN STATUS: NORMAL

## 2023-07-19 LAB
GAMMA INTERFERON BACKGROUND BLD IA-ACNC: 0.03 IU/ML
LAB CORP QUANTIFERON INCUBATION: NORMAL
M TB IFN-G BLD-IMP: NEGATIVE
M TB IFN-G CD4+ T-CELLS BLD-ACNC: 0.15 IU/ML
M TBIFN-G CD4+ CD8+T-CELLS BLD-ACNC: 0.07 IU/ML
MITOGEN IGNF BLD-ACNC: >10 IU/ML
SERVICE CMNT-IMP: NORMAL

## 2023-07-20 ENCOUNTER — TELEPHONE (OUTPATIENT)
Dept: PRIMARY CARE | Facility: CLINIC | Age: 58
End: 2023-07-20
Payer: COMMERCIAL

## 2023-07-20 NOTE — TELEPHONE ENCOUNTER
----- Message from Sheila Esparza RN sent at 7/20/2023 11:32 AM EDT -----  Regarding: FW: TB TEst EMployment form  Contact: [phone]    ----- Message -----  From: Mirela Lopez  Sent: 7/19/2023   4:01 PM EDT  To: [facility] Clinical Support Pool  Subject: TB TEst EMployment form                          Hello,  TB Test results are in. Can I please get the completed form?  Thank you

## 2023-07-20 NOTE — TELEPHONE ENCOUNTER
Sheila, I am off until Monday.  I will get it filled out then.  No one is available to complete the form

## 2023-08-24 ENCOUNTER — TELEPHONE (OUTPATIENT)
Dept: SCHEDULING | Facility: CLINIC | Age: 58
End: 2023-08-24
Payer: COMMERCIAL

## 2023-09-18 ENCOUNTER — HOSPITAL ENCOUNTER (OUTPATIENT)
Dept: CARDIOLOGY | Facility: CLINIC | Age: 58
Setting detail: NUCLEAR MEDICINE
End: 2023-09-18
Attending: INTERNAL MEDICINE
Payer: COMMERCIAL

## 2023-10-13 DIAGNOSIS — E11.9 TYPE 2 DIABETES MELLITUS WITHOUT COMPLICATION, WITHOUT LONG-TERM CURRENT USE OF INSULIN (CMS/HCC): ICD-10-CM

## 2023-11-01 ENCOUNTER — TELEPHONE (OUTPATIENT)
Dept: CARDIOLOGY | Facility: CLINIC | Age: 58
End: 2023-11-01

## 2023-11-08 NOTE — PROGRESS NOTES
"     Cardiology Office Visit     Patient ID: Mirela Lopez 58 y.o. female 1965  PCP: Nora Gudino, DO   History Present Illness     Mirela Lopez returns to the office today for follow-up of her coronary artery disease with previous percutaneous intervention on a 90% LAD lesion in March 2021 as well as a prior circumflex intervention in 2017 while on vacation in [place].  Her first presentation involved acute coronary syndrome, but the LAD intervention occurred after a follow-up stress test.  She also has a history of peripheral vascular disease and previous \"kissing iliac stents\" but has been stable.    Today in the office she says they have had a difficult year.  Her father-in-law was quite ill and passed away.  They were caring for him and she admits that her dietary habits deteriorated and she was also not doing her walking program.  She says she wants to get back to this and started on the treadmill this week.  She denies any anginal symptoms.    A few weeks ago we had attempted a nuclear stress test, but she had inability to obtain IV access.  The  thought she was volume depleted and she is now on Jardiance 25 mg daily and has an elevated A1c.  She says that when she does get blood the other day they also had a difficult time getting it so she thinks that this may be the case.  She is hoping to postpone the stress test since she is not having any symptoms right now, but understands that she will need this at some point.  She has no acute EKG changes.    Allergies/Medications   Allergies:Adhesive    Medications:    aspirin, Take 81 mg by mouth daily.    atorvastatin, Take 1 tablet (80 mg total) by mouth once daily.    blood sugar diagnostic, Use on edaily    empagliflozin, Take 1 tablet (25 mg total) by mouth daily.    ferrous sulfate, Take 1 tablet by mouth daily with breakfast.      glipiZIDE, Take 1 tablet (5 mg total) by mouth daily.    lancets, 1 lancet daily.    " "lisinopriL, Take 1 tablet (10 mg total) by mouth once daily.    metFORMIN XR, Take two daily    metoprolol succinate XL, Take 1 tablet (100 mg total) by mouth once daily.    nitroglycerin, Place 1 tablet (0.4 mg total) under the tongue every 5 (five) minutes as needed for chest pain.    rivaroxaban, Take 1 tablet (2.5 mg total) by mouth 2 (two) times a day.       Physical Exam     Vitals:    11/13/23 0931   BP: 120/68   BP Location: Left upper arm   Patient Position: Sitting   Pulse: 80   Resp: 16   Weight: 108 kg (238 lb)   Height: 1.626 m (5' 4.02\")     BP Readings from Last 3 Encounters:   11/13/23 120/68   07/05/23 124/70   06/28/23 110/60     Wt Readings from Last 3 Encounters:   11/13/23 108 kg (238 lb)   07/05/23 108 kg (239 lb)   06/28/23 106 kg (234 lb)      Physical Exam:  Constitutional: Obese white female.  No apparent distress.   Eyes: No icterus  ENT:  Mucosa moist  Neck: Supple, no JVD or hepatojugular reflux.  No bruits.  Cardiac: Normal S1 and S2, regular rate and rhythm, no S3.  No rub.  No ectopy.  There is a 1/6 systolic murmur at the left sternal border.  Lungs: Clear to auscultation bilaterally.  No wheezing.  Abdomen: Soft, nontender, positive normoactive bowel sounds.  No bruit.  Extremities: No clubbing or cyanosis.  No calf tenderness.  No edema.  Distal pulses are intact but diminished bilaterally.  Skin: Warm and dry.  No jaundice.  Musculoskeletal: No joint swelling or erythema.  Neurologic: Awake, alert, oriented.  Moving all extremities.  Psychiatric: No agitation.    Labs/Imaging/Procedures   Labs  Lab Results   Component Value Date    WBC 6.2 07/12/2023    HGB 12.7 07/12/2023     07/12/2023    ALT 37 (H) 07/12/2023    AST 36 07/12/2023     07/12/2023    K 4.1 07/12/2023     07/12/2023    CREATININE 0.56 (L) 07/12/2023    BUN 10 07/12/2023    CO2 21 07/12/2023    TSH 1.540 07/12/2023    INR 1.0 03/25/2021    HGBA1C 8.3 (H) 07/12/2023     Lab Results   Component " Value Date    CHOL 151 07/12/2023    CHOL 115 11/24/2021    HDL 43 07/12/2023    HDL 35 (L) 11/24/2021    TRIG 162 (H) 07/12/2023    TRIG 130 11/24/2021     Lab Results   Component Value Date    LDLCALC 80 07/12/2023    LDLCALC 57 11/24/2021     Imaging  TTE 7/5/2023:   Technically difficult study.   Normal LV size with mild concentric LVH.  LVEF approximately 65%.  Endocardium is difficult to visualize and wall motion cannot be adequately assessed.  No obvious wall motion abnormalities are noted.  Abnormal septal motion consistent with conduction abnormality.   Normal RV size and function.  Trace TR.  Jet insufficient to calculate RVSP.   Normal left atrial size.  Diastolic filling pattern acceptable for age.   The aortic valve is difficult to visualize but appears trileaflet and opens well.  No aortic stenosis or regurgitation.   Aortic root and ascending aorta are sclerotic.   Sclerotic mitral valve leaflets with mild MAC.  Trace mitral regurgitation.   IVC is not well-visualized.  Trivial pericardial effusion.    PVR 6/28/2023: The right resting JOURDAN is normal.  The left resting JOURDAN is normal.    PVR 6/22/22: Resting JOURDAN within normal range-1.13 on the right and 0.9 on the left.  PVR is normal on the left and nondiagnostic on the right.     PVR lower extremity 6/23/2021: Normal ABIs bilaterally at rest.  History of kissing iliac stents.       Cardiac catheterization 3/30/2021: Discrete 90% stenosis in the mid LAD followed by tubular 70% stenosis.  Prior left circumflex stents patent.  Patient had successful NERI of LAD lesions.       Echo 3/10/2021:  Technically difficult study secondary to body habitus. Overall normal LV size with mild concentric LVH.  LVEF 65-70%.  No obvious regional wall motion abnormalities but endocardium difficult to visualize.  Impaired relaxation. The RV  is difficult to visualize in subcostal views are of poor quality.  The RV is grossly normal in size and function in the apical  views. · Trileaflet aortic valve.  No aortic stenosis.  Sclerotic mitral valve leaflets with trace MR.  No significant pericardial effusion.     Exercise MPI 3/10/2021:  Abnormal exercise nuclear stress test.  Reversible defects are noted in the lateral wall, apex, and anterior wall suggestive of ischemia.  No prior study for comparison.  LVEF 67%.  No transient ischemic dilatation.  EKG portion is negative for arrhythmias or ischemic changes.  Patient exercised on Abner protocol for 3 minutes 4.6 METS.  Poor exercise tolerance.  No chest pain reported.  Peak blood pressure 144/88 mmHg.  Patient achieved 92% of predicted maximal heart rate.     LE arterial duplex bilateral 7/22/2020: Triphasic waveforms throughout the lower extremity with at least two-vessel runoff to the foot.  The left lower extremity has triphasic waveforms with at least two-vessel runoff.  There is a probable stenosis at the origin of the profunda.  No significant change.       LE arterial duplex bilateral 7/17/2019: Triphasic and biphasic waveforms throughout the right and left lower extremities.  No evidence of discrete stenosis or obstruction.       Exercise stress echocardiogram 4/19/2019: Very technically difficult study.  Exercise EKG negative for ischemia or arrhythmia. Poor exercise capacity completing only stage I Abner protocol.  Exercise EKG negative for ischemia or arrhythmia at that workload.  At rest, normal LV size and function with LVEF 60-65%.  Basal mid inferior hypokinesis.  Mildly dilated RV with preserved function.  Trace TR.  Trace MR.  Trileaflet aortic valve.  No significant stenosis or insufficiency.  Prominent pericardial fat pad.  Immediately following exercise overall LV function improves.  Persistent basal inferior hypokinesis consistent with scar. No ischemia.       PVR 1/9/2019: Right JOURDAN 1.12 at rest and 1.1 following exercise.  Left JOURDAN 0.95 at rest and 0.96 following exercise.  Normal segmental pressures and  PVRs.       Periph Intervention 10/2017: Successful right iliac stent.     Abd Ao Duplex 9/2017: Elevated velocities right MICHAEL with monophasic flow.     Echo 9/2017: LVEF 65%, Top normal LV size with overall preserved function. No significant pulmonary hypertension by Doppler. Technically difficult study.     Cath 8/13/17: LAD tortuous, no significant disease. RCA dominant and patent. Left circumflex with 90% proximal lesion, 95% mid lesion and distal thrombus. Successful NERI ×2 left circumflex. Occluded right MICHAEL at origin with collaterals. Patent left MICHAEL and CFA       EKG  Assessment/Plan     1. CAD in native artery    2. Peripheral vascular disease (CMS/[facility])    3. Claudication (CMS/[facility])    4. Type 2 diabetes mellitus without complication, without long-term current use of insulin (CMS/[facility])    5. Class 3 severe obesity due to excess calories with serious comorbidity and body mass index (BMI) of 40.0 to 44.9 in adult (CMS/[facility])    6. Essential hypertension    7. Abnormal EKG       Mirela has known coronary artery disease but denies any anginal symptoms.  Her weight is up as well as her A1c and her LDL is in the 80s.  She says that she is determined to focus now on getting back to an exercise program and better dietary discretion.  Her  is also gained weight, so they are both focused on this.  For now I will not increase any medication-hopefully she will be able to drop her LDL about 10 points with the above dietary changes.  In the past, exercise has also been quite helpful for her.    With respect to her coronary disease, she will continue on aspirin and low-dose rivaroxaban 2.5 mg twice daily.  She has not needed any nitroglycerin.  Mirela will remain on Toprol  mg daily and as stated is already on Jardiance.    We discussed her stress test and she would like to hold off for now.  I think this is reasonable as long as she does not have any symptoms, but I told her she will need to have this at some  point.  If she is stable, we will discuss it at her next visit.  I want to be more vigilant because her LAD stenosis was determined from a stress test, even though she was not having many symptoms at that time.    Return in about 6 months (around 5/13/2024) for follow-up, earlier if any change in symptoms.  Available medical records reviewed and updated including laboratory data, imaging studies, previous outpatient and inpatient records.  Counseling/education performed. Care everywhere utilized where appropriate.    Thank you for allowing me to participate in the care of this patient.  I hope this information is helpful.      Zara Mccloud MD, [facility], FASE  11/14/2023  8:29 AM     By signing my name below, I, Lucita Jeffery, attest that this documentation has been prepared under the direction and in the presence of MD Lucita Galan  11/14/2023 8:29 AM    This document was generated utilizing voice recognition technology. Please excuse any typographical errors which may be present.

## 2023-11-13 ENCOUNTER — OFFICE VISIT (OUTPATIENT)
Dept: CARDIOLOGY | Facility: CLINIC | Age: 58
End: 2023-11-13
Payer: COMMERCIAL

## 2023-11-13 VITALS
SYSTOLIC BLOOD PRESSURE: 120 MMHG | HEART RATE: 80 BPM | WEIGHT: 238 LBS | HEIGHT: 64 IN | BODY MASS INDEX: 40.63 KG/M2 | RESPIRATION RATE: 16 BRPM | DIASTOLIC BLOOD PRESSURE: 68 MMHG

## 2023-11-13 DIAGNOSIS — R94.31 ABNORMAL EKG: ICD-10-CM

## 2023-11-13 DIAGNOSIS — E11.9 TYPE 2 DIABETES MELLITUS WITHOUT COMPLICATION, WITHOUT LONG-TERM CURRENT USE OF INSULIN (CMS/HCC): ICD-10-CM

## 2023-11-13 DIAGNOSIS — E66.01 CLASS 3 SEVERE OBESITY DUE TO EXCESS CALORIES WITH SERIOUS COMORBIDITY AND BODY MASS INDEX (BMI) OF 40.0 TO 44.9 IN ADULT (CMS/HCC): ICD-10-CM

## 2023-11-13 DIAGNOSIS — I10 ESSENTIAL HYPERTENSION: ICD-10-CM

## 2023-11-13 DIAGNOSIS — I73.9 PERIPHERAL VASCULAR DISEASE (CMS/HCC): ICD-10-CM

## 2023-11-13 DIAGNOSIS — E66.813 CLASS 3 SEVERE OBESITY DUE TO EXCESS CALORIES WITH SERIOUS COMORBIDITY AND BODY MASS INDEX (BMI) OF 40.0 TO 44.9 IN ADULT (CMS/HCC): ICD-10-CM

## 2023-11-13 DIAGNOSIS — I25.10 CAD IN NATIVE ARTERY: Primary | ICD-10-CM

## 2023-11-13 DIAGNOSIS — I73.9 CLAUDICATION (CMS/HCC): ICD-10-CM

## 2023-11-13 PROCEDURE — 99214 OFFICE O/P EST MOD 30 MIN: CPT | Performed by: INTERNAL MEDICINE

## 2023-11-13 PROCEDURE — 3074F SYST BP LT 130 MM HG: CPT | Performed by: INTERNAL MEDICINE

## 2023-11-13 PROCEDURE — 3008F BODY MASS INDEX DOCD: CPT | Performed by: INTERNAL MEDICINE

## 2023-11-13 PROCEDURE — 3078F DIAST BP <80 MM HG: CPT | Performed by: INTERNAL MEDICINE

## 2023-11-13 PROCEDURE — 93000 ELECTROCARDIOGRAM COMPLETE: CPT | Performed by: INTERNAL MEDICINE

## 2023-11-13 RX ORDER — LISINOPRIL 10 MG/1
10 TABLET ORAL
Qty: 90 TABLET | Refills: 3 | Status: SHIPPED | OUTPATIENT
Start: 2023-11-13

## 2023-11-13 RX ORDER — METOPROLOL SUCCINATE 100 MG/1
100 TABLET, EXTENDED RELEASE ORAL
Qty: 90 TABLET | Refills: 3 | Status: SHIPPED | OUTPATIENT
Start: 2023-11-13

## 2023-11-13 RX ORDER — ATORVASTATIN CALCIUM 80 MG/1
80 TABLET, FILM COATED ORAL
Qty: 90 TABLET | Refills: 3 | Status: SHIPPED | OUTPATIENT
Start: 2023-11-13

## 2023-11-13 RX ORDER — NITROGLYCERIN 0.4 MG/1
0.4 TABLET SUBLINGUAL EVERY 5 MIN PRN
Qty: 25 TABLET | Refills: 3 | Status: SHIPPED | OUTPATIENT
Start: 2023-11-13 | End: 2024-12-23

## 2023-11-13 NOTE — LETTER
"November 14, 2023     Nora Gudino DO  [address] [facility] [address]    Patient: Mirela Lopez  YOB: 1965  Date of Visit: 11/13/2023      Dear Dr. Gudino:    Thank you for referring Mirela Lopez to me for evaluation. Below are my notes for this consultation.    If you have questions, please do not hesitate to call me. I look forward to following your patient along with you.         Sincerely,        Zara Mccloud MD        CC: MD Oneyda Naik Erin, MD  11/14/2023  8:33 AM  Signed       Cardiology Office Visit     Patient ID: Mirela Lopez 58 y.o. female 1965  PCP: Nora Gudino DO   History Present Illness     Mirela Lopez returns to the office today for follow-up of her coronary artery disease with previous percutaneous intervention on a 90% LAD lesion in March 2021 as well as a prior circumflex intervention in 2017 while on vacation in [place].  Her first presentation involved acute coronary syndrome, but the LAD intervention occurred after a follow-up stress test.  She also has a history of peripheral vascular disease and previous \"kissing iliac stents\" but has been stable.    Today in the office she says they have had a difficult year.  Her father-in-law was quite ill and passed away.  They were caring for him and she admits that her dietary habits deteriorated and she was also not doing her walking program.  She says she wants to get back to this and started on the treadmill this week.  She denies any anginal symptoms.    A few weeks ago we had attempted a nuclear stress test, but she had inability to obtain IV access.  The  thought she was volume depleted and she is now on Jardiance 25 mg daily and has an elevated A1c.  She says that when she does get blood the other day they also had a difficult time getting it so she thinks that this may be the case.  She is hoping to postpone the stress test since she is not having any " "symptoms right now, but understands that she will need this at some point.  She has no acute EKG changes.    Allergies/Medications   Allergies:Adhesive    Medications:    aspirin, Take 81 mg by mouth daily.    atorvastatin, Take 1 tablet (80 mg total) by mouth once daily.    blood sugar diagnostic, Use on edaily    empagliflozin, Take 1 tablet (25 mg total) by mouth daily.    ferrous sulfate, Take 1 tablet by mouth daily with breakfast.      glipiZIDE, Take 1 tablet (5 mg total) by mouth daily.    lancets, 1 lancet daily.    lisinopriL, Take 1 tablet (10 mg total) by mouth once daily.    metFORMIN XR, Take two daily    metoprolol succinate XL, Take 1 tablet (100 mg total) by mouth once daily.    nitroglycerin, Place 1 tablet (0.4 mg total) under the tongue every 5 (five) minutes as needed for chest pain.    rivaroxaban, Take 1 tablet (2.5 mg total) by mouth 2 (two) times a day.       Physical Exam     Vitals:    11/13/23 0931   BP: 120/68   BP Location: Left upper arm   Patient Position: Sitting   Pulse: 80   Resp: 16   Weight: 108 kg (238 lb)   Height: 1.626 m (5' 4.02\")     BP Readings from Last 3 Encounters:   11/13/23 120/68   07/05/23 124/70   06/28/23 110/60     Wt Readings from Last 3 Encounters:   11/13/23 108 kg (238 lb)   07/05/23 108 kg (239 lb)   06/28/23 106 kg (234 lb)      Physical Exam:  Constitutional: Obese white female.  No apparent distress.   Eyes: No icterus  ENT:  Mucosa moist  Neck: Supple, no JVD or hepatojugular reflux.  No bruits.  Cardiac: Normal S1 and S2, regular rate and rhythm, no S3.  No rub.  No ectopy.  There is a 1/6 systolic murmur at the left sternal border.  Lungs: Clear to auscultation bilaterally.  No wheezing.  Abdomen: Soft, nontender, positive normoactive bowel sounds.  No bruit.  Extremities: No clubbing or cyanosis.  No calf tenderness.  No edema.  Distal pulses are intact but diminished bilaterally.  Skin: Warm and dry.  No jaundice.  Musculoskeletal: No " joint swelling or erythema.  Neurologic: Awake, alert, oriented.  Moving all extremities.  Psychiatric: No agitation.    Labs/Imaging/Procedures   Labs  Lab Results   Component Value Date    WBC 6.2 07/12/2023    HGB 12.7 07/12/2023     07/12/2023    ALT 37 (H) 07/12/2023    AST 36 07/12/2023     07/12/2023    K 4.1 07/12/2023     07/12/2023    CREATININE 0.56 (L) 07/12/2023    BUN 10 07/12/2023    CO2 21 07/12/2023    TSH 1.540 07/12/2023    INR 1.0 03/25/2021    HGBA1C 8.3 (H) 07/12/2023     Lab Results   Component Value Date    CHOL 151 07/12/2023    CHOL 115 11/24/2021    HDL 43 07/12/2023    HDL 35 (L) 11/24/2021    TRIG 162 (H) 07/12/2023    TRIG 130 11/24/2021     Lab Results   Component Value Date    LDLCALC 80 07/12/2023    LDLCALC 57 11/24/2021     Imaging  TTE 7/5/2023:   Technically difficult study.   Normal LV size with mild concentric LVH.  LVEF approximately 65%.  Endocardium is difficult to visualize and wall motion cannot be adequately assessed.  No obvious wall motion abnormalities are noted.  Abnormal septal motion consistent with conduction abnormality.   Normal RV size and function.  Trace TR.  Jet insufficient to calculate RVSP.   Normal left atrial size.  Diastolic filling pattern acceptable for age.   The aortic valve is difficult to visualize but appears trileaflet and opens well.  No aortic stenosis or regurgitation.   Aortic root and ascending aorta are sclerotic.   Sclerotic mitral valve leaflets with mild MAC.  Trace mitral regurgitation.   IVC is not well-visualized.  Trivial pericardial effusion.    PVR 6/28/2023: The right resting JOURDAN is normal.  The left resting JOURDAN is normal.    PVR 6/22/22: Resting JOURDAN within normal range-1.13 on the right and 0.9 on the left.  PVR is normal on the left and nondiagnostic on the right.     PVR lower extremity 6/23/2021: Normal ABIs bilaterally at rest.  History of kissing iliac stents.       Cardiac catheterization  3/30/2021: Discrete 90% stenosis in the mid LAD followed by tubular 70% stenosis.  Prior left circumflex stents patent.  Patient had successful NERI of LAD lesions.       Echo 3/10/2021:  Technically difficult study secondary to body habitus. Overall normal LV size with mild concentric LVH.  LVEF 65-70%.  No obvious regional wall motion abnormalities but endocardium difficult to visualize.  Impaired relaxation. The RV  is difficult to visualize in subcostal views are of poor quality.  The RV is grossly normal in size and function in the apical views. · Trileaflet aortic valve.  No aortic stenosis.  Sclerotic mitral valve leaflets with trace MR.  No significant pericardial effusion.     Exercise MPI 3/10/2021:  Abnormal exercise nuclear stress test.  Reversible defects are noted in the lateral wall, apex, and anterior wall suggestive of ischemia.  No prior study for comparison.  LVEF 67%.  No transient ischemic dilatation.  EKG portion is negative for arrhythmias or ischemic changes.  Patient exercised on Abner protocol for 3 minutes 4.6 METS.  Poor exercise tolerance.  No chest pain reported.  Peak blood pressure 144/88 mmHg.  Patient achieved 92% of predicted maximal heart rate.     LE arterial duplex bilateral 7/22/2020: Triphasic waveforms throughout the lower extremity with at least two-vessel runoff to the foot.  The left lower extremity has triphasic waveforms with at least two-vessel runoff.  There is a probable stenosis at the origin of the profunda.  No significant change.       LE arterial duplex bilateral 7/17/2019: Triphasic and biphasic waveforms throughout the right and left lower extremities.  No evidence of discrete stenosis or obstruction.       Exercise stress echocardiogram 4/19/2019: Very technically difficult study.  Exercise EKG negative for ischemia or arrhythmia. Poor exercise capacity completing only stage I Abner protocol.  Exercise EKG negative for ischemia or arrhythmia at that workload.   At rest, normal LV size and function with LVEF 60-65%.  Basal mid inferior hypokinesis.  Mildly dilated RV with preserved function.  Trace TR.  Trace MR.  Trileaflet aortic valve.  No significant stenosis or insufficiency.  Prominent pericardial fat pad.  Immediately following exercise overall LV function improves.  Persistent basal inferior hypokinesis consistent with scar. No ischemia.       PVR 1/9/2019: Right JOURDAN 1.12 at rest and 1.1 following exercise.  Left JOURDAN 0.95 at rest and 0.96 following exercise.  Normal segmental pressures and PVRs.       Periph Intervention 10/2017: Successful right iliac stent.     Abd Ao Duplex 9/2017: Elevated velocities right MICHAEL with monophasic flow.     Echo 9/2017: LVEF 65%, Top normal LV size with overall preserved function. No significant pulmonary hypertension by Doppler. Technically difficult study.     Cath 8/13/17: LAD tortuous, no significant disease. RCA dominant and patent. Left circumflex with 90% proximal lesion, 95% mid lesion and distal thrombus. Successful NERI ×2 left circumflex. Occluded right MICHAEL at origin with collaterals. Patent left MICHAEL and CFA       EKG  Assessment/Plan     1. CAD in native artery    2. Peripheral vascular disease (CMS/HCC)    3. Claudication (CMS/HCC)    4. Type 2 diabetes mellitus without complication, without long-term current use of insulin (CMS/HCC)    5. Class 3 severe obesity due to excess calories with serious comorbidity and body mass index (BMI) of 40.0 to 44.9 in adult (CMS/HCC)    6. Essential hypertension    7. Abnormal EKG       Mirela has known coronary artery disease but denies any anginal symptoms.  Her weight is up as well as her A1c and her LDL is in the 80s.  She says that she is determined to focus now on getting back to an exercise program and better dietary discretion.  Her  is also gained weight, so they are both focused on this.  For now I will not increase any medication-hopefully she will be able to drop her  LDL about 10 points with the above dietary changes.  In the past, exercise has also been quite helpful for her.    With respect to her coronary disease, she will continue on aspirin and low-dose rivaroxaban 2.5 mg twice daily.  She has not needed any nitroglycerin.  Mirela will remain on Toprol  mg daily and as stated is already on Jardiance.    We discussed her stress test and she would like to hold off for now.  I think this is reasonable as long as she does not have any symptoms, but I told her she will need to have this at some point.  If she is stable, we will discuss it at her next visit.  I want to be more vigilant because her LAD stenosis was determined from a stress test, even though she was not having many symptoms at that time.    Return in about 6 months (around 5/13/2024) for follow-up, earlier if any change in symptoms.  Available medical records reviewed and updated including laboratory data, imaging studies, previous outpatient and inpatient records.  Counseling/education performed. Care everywhere utilized where appropriate.    Thank you for allowing me to participate in the care of this patient.  I hope this information is helpful.      Zara Mccloud MD, [facility], FASE  11/14/2023  8:29 AM     By signing my name below, I, Lucita Jeffery, attest that this documentation has been prepared under the direction and in the presence of MD Lucita Galan  11/14/2023 8:29 AM    This document was generated utilizing voice recognition technology. Please excuse any typographical errors which may be present.

## 2024-05-21 ENCOUNTER — OFFICE VISIT (OUTPATIENT)
Dept: CARDIOLOGY | Facility: CLINIC | Age: 59
End: 2024-05-21
Payer: COMMERCIAL

## 2024-05-21 VITALS
HEIGHT: 64 IN | RESPIRATION RATE: 16 BRPM | BODY MASS INDEX: 39.61 KG/M2 | SYSTOLIC BLOOD PRESSURE: 110 MMHG | DIASTOLIC BLOOD PRESSURE: 66 MMHG | HEART RATE: 76 BPM | WEIGHT: 232 LBS

## 2024-05-21 DIAGNOSIS — E11.9 TYPE 2 DIABETES MELLITUS WITHOUT COMPLICATION, WITHOUT LONG-TERM CURRENT USE OF INSULIN (CMS/HCC): ICD-10-CM

## 2024-05-21 DIAGNOSIS — I10 ESSENTIAL HYPERTENSION: ICD-10-CM

## 2024-05-21 DIAGNOSIS — I73.9 CLAUDICATION (CMS/HCC): ICD-10-CM

## 2024-05-21 DIAGNOSIS — E66.813 CLASS 3 SEVERE OBESITY DUE TO EXCESS CALORIES WITH SERIOUS COMORBIDITY AND BODY MASS INDEX (BMI) OF 40.0 TO 44.9 IN ADULT (CMS/HCC): ICD-10-CM

## 2024-05-21 DIAGNOSIS — R06.09 DYSPNEA ON EXERTION: ICD-10-CM

## 2024-05-21 DIAGNOSIS — R94.31 ABNORMAL EKG: ICD-10-CM

## 2024-05-21 DIAGNOSIS — I73.9 PERIPHERAL VASCULAR DISEASE (CMS/HCC): ICD-10-CM

## 2024-05-21 DIAGNOSIS — E66.01 CLASS 3 SEVERE OBESITY DUE TO EXCESS CALORIES WITH SERIOUS COMORBIDITY AND BODY MASS INDEX (BMI) OF 40.0 TO 44.9 IN ADULT (CMS/HCC): ICD-10-CM

## 2024-05-21 DIAGNOSIS — I25.10 CAD IN NATIVE ARTERY: Primary | ICD-10-CM

## 2024-05-21 LAB
ATRIAL RATE: 76
P AXIS: 38
PR INTERVAL: 156
QRS DURATION: 82
QT INTERVAL: 392
QTC CALCULATION(BAZETT): 441
R AXIS: -8
T WAVE AXIS: 60
VENTRICULAR RATE: 76

## 2024-05-21 PROCEDURE — 3008F BODY MASS INDEX DOCD: CPT | Performed by: INTERNAL MEDICINE

## 2024-05-21 PROCEDURE — 3078F DIAST BP <80 MM HG: CPT | Performed by: INTERNAL MEDICINE

## 2024-05-21 PROCEDURE — 99214 OFFICE O/P EST MOD 30 MIN: CPT | Performed by: INTERNAL MEDICINE

## 2024-05-21 PROCEDURE — 3074F SYST BP LT 130 MM HG: CPT | Performed by: INTERNAL MEDICINE

## 2024-05-21 PROCEDURE — 93000 ELECTROCARDIOGRAM COMPLETE: CPT | Performed by: INTERNAL MEDICINE

## 2024-05-21 NOTE — PROGRESS NOTES
"     Cardiology Office Visit     Patient ID: Mirela Lopez 58 y.o. female 1965  PCP: Nora Gudino, DO   History Present Illness     Mirela Lopez presents to the office today for follow-up regarding coronary artery disease and previous percutaneous intervention and a 90% LAD lesion in March 2021.  She also had prior circumflex intervention for acute coronary syndrome modification in [place] in 2017.  She has a history of peripheral vascular disease and previous \"kissing iliac stents\" but is stable from that perspective.  She denies any claudication.  She is retired and is now attempting to increase her exercise.  She sustained an injury, so she is trying to do chair yoga.    We had tried to do a nuclear stress test late last year but were unable to obtain IV access.  It appeared that she was volume depleted in the setting of an elevated hemoglobin A1c and Jardiance.    Today she says she feels well.  She has no PND or orthopnea.  There is no exertional chest pain.  Mirela gets dyspnea with heavier exertion which has always been one of her symptoms.  No lower extremity edema.    Allergies/Medications   Allergies:Adhesive and Sulfa (sulfonamide antibiotics)    Medications:    aspirin, Take 81 mg by mouth daily.    atorvastatin, Take 1 tablet (80 mg total) by mouth once daily.    blood sugar diagnostic, Use on edaily    empagliflozin, Take 1 tablet (25 mg total) by mouth daily.    ferrous sulfate, Take 1 tablet by mouth daily with breakfast.      glipiZIDE, Take 1 tablet (5 mg total) by mouth daily.    lancets, 1 lancet daily.    lisinopriL, Take 1 tablet (10 mg total) by mouth once daily.    metFORMIN XR, Take two daily    metoprolol succinate XL, Take 1 tablet (100 mg total) by mouth once daily.    nitroglycerin, Place 1 tablet (0.4 mg total) under the tongue every 5 (five) minutes as needed for chest pain.    rivaroxaban, Take 1 tablet (2.5 mg total) by mouth 2 (two) times a day.     Physical Exam " "    Vitals:    05/21/24 0928 05/21/24 0940   BP: 110/80 110/66   BP Location: Left upper arm Left upper arm   Patient Position: Sitting    Pulse: 76    Resp: 16    Weight: 105 kg (232 lb)    Height: 1.626 m (5' 4.02\")      BP Readings from Last 3 Encounters:   05/21/24 110/66   11/13/23 120/68   07/05/23 124/70     Wt Readings from Last 3 Encounters:   05/21/24 105 kg (232 lb)   11/13/23 108 kg (238 lb)   07/05/23 108 kg (239 lb)      Physical Exam:  Constitutional: Obese white female. No apparent distress.   Eyes: No icterus  ENT:  Mucosa moist  Neck: Supple, no JVD or hepatojugular reflux.  No bruits.  Cardiac: Normal S1 and S2, regular rate and rhythm, no S3.  No rub.  No ectopy.  There is a 1/6 systolic murmur at the left sternal border.   Lungs: Clear to auscultation bilaterally.  No wheezing.  Abdomen: Soft, nontender, positive normoactive bowel sounds.  No bruit.  Extremities: No clubbing or cyanosis.  No calf tenderness.  No edema.  Distal pulses are intact but diminished bilaterally.   Skin: Warm and dry.  No jaundice.  Musculoskeletal: No joint swelling or erythema.  Neurologic: Awake, alert, oriented.  Moving all extremities.  Psychiatric: No agitation.    Labs/Imaging/Procedures   Labs  Lab Results   Component Value Date    WBC 6.2 07/12/2023    HGB 12.7 07/12/2023     07/12/2023    ALT 37 (H) 07/12/2023    AST 36 07/12/2023     07/12/2023    K 4.1 07/12/2023     07/12/2023    CREATININE 0.56 (L) 07/12/2023    BUN 10 07/12/2023    CO2 21 07/12/2023    TSH 1.540 07/12/2023    INR 1.0 03/25/2021    HGBA1C 8.3 (H) 07/12/2023     Lab Results   Component Value Date    CHOL 151 07/12/2023    CHOL 115 11/24/2021    HDL 43 07/12/2023    HDL 35 (L) 11/24/2021    TRIG 162 (H) 07/12/2023    TRIG 130 11/24/2021     Lab Results   Component Value Date    LDLCALC 80 07/12/2023    LDLCALC 57 11/24/2021     Imaging  TTE 7/5/2023:  Technically difficult study.  Normal LV size with mild concentric LVH.  " LVEF approximately 65%.  Endocardium is difficult to visualize and wall motion cannot be adequately assessed.  No obvious wall motion abnormalities are noted.  Abnormal septal motion consistent with conduction abnormality.  Normal RV size and function.  Trace TR.  Jet insufficient to calculate RVSP.  Normal left atrial size.  Diastolic filling pattern acceptable for age.  The aortic valve is difficult to visualize but appears trileaflet and opens well.  No aortic stenosis or regurgitation.  Aortic root and ascending aorta are sclerotic.  Sclerotic mitral valve leaflets with mild MAC.  Trace mitral regurgitation.  IVC is not well-visualized.  Trivial pericardial effusion.     PVR 6/28/2023: The right resting JOURDAN is normal.  The left resting JOURDAN is normal.     PVR 6/22/22: Resting JOURDAN within normal range-1.13 on the right and 0.9 on the left.  PVR is normal on the left and nondiagnostic on the right.     PVR lower extremity 6/23/2021: Normal ABIs bilaterally at rest.  History of kissing iliac stents.       Cardiac catheterization 3/30/2021: Discrete 90% stenosis in the mid LAD followed by tubular 70% stenosis.  Prior left circumflex stents patent.  Patient had successful NERI of LAD lesions.       Echo 3/10/2021:  Technically difficult study secondary to body habitus. Overall normal LV size with mild concentric LVH.  LVEF 65-70%.  No obvious regional wall motion abnormalities but endocardium difficult to visualize.  Impaired relaxation. The RV  is difficult to visualize in subcostal views are of poor quality.  The RV is grossly normal in size and function in the apical views. · Trileaflet aortic valve.  No aortic stenosis.  Sclerotic mitral valve leaflets with trace MR.  No significant pericardial effusion.     Exercise MPI 3/10/2021:  Abnormal exercise nuclear stress test.  Reversible defects are noted in the lateral wall, apex, and anterior wall suggestive of ischemia.  No prior study for comparison.  LVEF 67%.  No  transient ischemic dilatation.  EKG portion is negative for arrhythmias or ischemic changes.  Patient exercised on Abner protocol for 3 minutes 4.6 METS.  Poor exercise tolerance.  No chest pain reported.  Peak blood pressure 144/88 mmHg.  Patient achieved 92% of predicted maximal heart rate.     LE arterial duplex bilateral 7/22/2020: Triphasic waveforms throughout the lower extremity with at least two-vessel runoff to the foot.  The left lower extremity has triphasic waveforms with at least two-vessel runoff.  There is a probable stenosis at the origin of the profunda.  No significant change.       LE arterial duplex bilateral 7/17/2019: Triphasic and biphasic waveforms throughout the right and left lower extremities.  No evidence of discrete stenosis or obstruction.       Exercise stress echocardiogram 4/19/2019: Very technically difficult study.  Exercise EKG negative for ischemia or arrhythmia. Poor exercise capacity completing only stage I Abner protocol.  Exercise EKG negative for ischemia or arrhythmia at that workload.  At rest, normal LV size and function with LVEF 60-65%.  Basal mid inferior hypokinesis.  Mildly dilated RV with preserved function.  Trace TR.  Trace MR.  Trileaflet aortic valve.  No significant stenosis or insufficiency.  Prominent pericardial fat pad.  Immediately following exercise overall LV function improves.  Persistent basal inferior hypokinesis consistent with scar. No ischemia.       PVR 1/9/2019: Right JOURDAN 1.12 at rest and 1.1 following exercise.  Left JOURDAN 0.95 at rest and 0.96 following exercise.  Normal segmental pressures and PVRs.       Periph Intervention 10/2017: Successful right iliac stent.     Abd Ao Duplex 9/2017: Elevated velocities right MICHAEL with monophasic flow.     Echo 9/2017: LVEF 65%, Top normal LV size with overall preserved function. No significant pulmonary hypertension by Doppler. Technically difficult study.     Cath 8/13/17: LAD tortuous, no significant  disease. RCA dominant and patent. Left circumflex with 90% proximal lesion, 95% mid lesion and distal thrombus. Successful NERI ×2 left circumflex. Occluded right MICHAEL at origin with collaterals. Patent left MICHAEL and CFA    EKG: Sinus rhythm, unremarkable tracing.  No significant change.    Assessment/Plan     1. CAD in native artery    2. Peripheral vascular disease (CMS/[facility])    3. Claudication (CMS/[facility])    4. Type 2 diabetes mellitus without complication, without long-term current use of insulin (CMS/[facility])    5. Class 3 severe obesity due to excess calories with serious comorbidity and body mass index (BMI) of 40.0 to 44.9 in adult (CMS/[facility])    6. Essential hypertension    7. Abnormal EKG    8. Dyspnea on exertion       Mirela is doing well.  She has no evidence of volume overload, but does have aggressive coronary disease for a woman in her 50s.  She is trying to exercise more to lose weight and get her diabetes under better control.  I am concerned about her dyspnea with exertion since this has been a previous anginal equivalent.  She continues on aspirin and low-dose rivaroxaban without any bleeding.  She is on metoprolol 100 mg daily and now Jardiance.  Currently she is not on any injectable therapy, although this may help her with weight loss.    I would like to try to perform a pharmacologic stress test, but given her obesity and diabetes, I think she would be better served with PET myocardial perfusion.  Previous studies have been technically difficult.  She needs pharmacologic testing secondary to poor exercise capacity.  She will have this performed at her earliest opportunity.    Stable peripheral vascular symptoms.  Continue current therapy.    Return in about 6 months (around 11/21/2024) for follow-up, earlier if any change in symptoms, and after studies have been completed.  Available medical records reviewed and updated including laboratory data, imaging studies, previous outpatient and inpatient records.   Counseling/education performed. Care everywhere utilized where appropriate.    Thank you for allowing me to participate in the care of this patient.  I hope this information is helpful.      Zara Mccloud MD, [facility], [facility]  5/23/2024  6:17 PM  This document was generated utilizing voice recognition technology. Please excuse any typographical errors which may be present.      By signing my name below, I, Gladys Ham, attest that this documentation has been prepared under the direction and in the presence of MD Gladys Galan  5/23/2024 6:17 PM

## 2024-05-21 NOTE — LETTER
"May 23, 2024     Nora Gudino DO  [address] [facility] [address]    Patient: Mirela Lopez  YOB: 1965  Date of Visit: 5/21/2024      Dear Dr. Gudino:    Thank you for referring Mirela Lopez to me for evaluation. Below are my notes for this consultation.    If you have questions, please do not hesitate to call me. I look forward to following your patient along with you.         Sincerely,        Zara Mccloud MD        CC: MD Oneyda Naik Erin, MD  5/23/2024  6:20 PM  Sign when Signing Visit       Cardiology Office Visit     Patient ID: Mirela Lopez 58 y.o. female 1965  PCP: Nora Gudino DO   History Present Illness     Mirela Lopez presents to the office today for follow-up regarding coronary artery disease and previous percutaneous intervention and a 90% LAD lesion in March 2021.  She also had prior circumflex intervention for acute coronary syndrome modification in [place] in 2017.  She has a history of peripheral vascular disease and previous \"kissing iliac stents\" but is stable from that perspective.  She denies any claudication.  She is retired and is now attempting to increase her exercise.  She sustained an injury, so she is trying to do chair yoga.    We had tried to do a nuclear stress test late last year but were unable to obtain IV access.  It appeared that she was volume depleted in the setting of an elevated hemoglobin A1c and Jardiance.    Today she says she feels well.  She has no PND or orthopnea.  There is no exertional chest pain.  Mirela gets dyspnea with heavier exertion which has always been one of her symptoms.  No lower extremity edema.    Allergies/Medications   Allergies:Adhesive and Sulfa (sulfonamide antibiotics)    Medications:  •  aspirin, Take 81 mg by mouth daily.  •  atorvastatin, Take 1 tablet (80 mg total) by mouth once daily.  •  blood sugar diagnostic, Use on edaily  •  empagliflozin, Take 1 tablet (25 mg " "total) by mouth daily.  •  ferrous sulfate, Take 1 tablet by mouth daily with breakfast.    •  glipiZIDE, Take 1 tablet (5 mg total) by mouth daily.  •  lancets, 1 lancet daily.  •  lisinopriL, Take 1 tablet (10 mg total) by mouth once daily.  •  metFORMIN XR, Take two daily  •  metoprolol succinate XL, Take 1 tablet (100 mg total) by mouth once daily.  •  nitroglycerin, Place 1 tablet (0.4 mg total) under the tongue every 5 (five) minutes as needed for chest pain.  •  rivaroxaban, Take 1 tablet (2.5 mg total) by mouth 2 (two) times a day.     Physical Exam     Vitals:    05/21/24 0928 05/21/24 0940   BP: 110/80 110/66   BP Location: Left upper arm Left upper arm   Patient Position: Sitting    Pulse: 76    Resp: 16    Weight: 105 kg (232 lb)    Height: 1.626 m (5' 4.02\")      BP Readings from Last 3 Encounters:   05/21/24 110/66   11/13/23 120/68   07/05/23 124/70     Wt Readings from Last 3 Encounters:   05/21/24 105 kg (232 lb)   11/13/23 108 kg (238 lb)   07/05/23 108 kg (239 lb)      Physical Exam:  Constitutional: Obese white female. No apparent distress.   Eyes: No icterus  ENT:  Mucosa moist  Neck: Supple, no JVD or hepatojugular reflux.  No bruits.  Cardiac: Normal S1 and S2, regular rate and rhythm, no S3.  No rub.  No ectopy.  There is a 1/6 systolic murmur at the left sternal border.   Lungs: Clear to auscultation bilaterally.  No wheezing.  Abdomen: Soft, nontender, positive normoactive bowel sounds.  No bruit.  Extremities: No clubbing or cyanosis.  No calf tenderness.  No edema.  Distal pulses are intact but diminished bilaterally.   Skin: Warm and dry.  No jaundice.  Musculoskeletal: No joint swelling or erythema.  Neurologic: Awake, alert, oriented.  Moving all extremities.  Psychiatric: No agitation.    Labs/Imaging/Procedures   Labs  Lab Results   Component Value Date    WBC 6.2 07/12/2023    HGB 12.7 07/12/2023     07/12/2023    ALT 37 (H) 07/12/2023    AST 36 07/12/2023     " 07/12/2023    K 4.1 07/12/2023     07/12/2023    CREATININE 0.56 (L) 07/12/2023    BUN 10 07/12/2023    CO2 21 07/12/2023    TSH 1.540 07/12/2023    INR 1.0 03/25/2021    HGBA1C 8.3 (H) 07/12/2023     Lab Results   Component Value Date    CHOL 151 07/12/2023    CHOL 115 11/24/2021    HDL 43 07/12/2023    HDL 35 (L) 11/24/2021    TRIG 162 (H) 07/12/2023    TRIG 130 11/24/2021     Lab Results   Component Value Date    LDLCALC 80 07/12/2023    LDLCALC 57 11/24/2021     Imaging  TTE 7/5/2023:  Technically difficult study.  Normal LV size with mild concentric LVH.  LVEF approximately 65%.  Endocardium is difficult to visualize and wall motion cannot be adequately assessed.  No obvious wall motion abnormalities are noted.  Abnormal septal motion consistent with conduction abnormality.  Normal RV size and function.  Trace TR.  Jet insufficient to calculate RVSP.  Normal left atrial size.  Diastolic filling pattern acceptable for age.  The aortic valve is difficult to visualize but appears trileaflet and opens well.  No aortic stenosis or regurgitation.  Aortic root and ascending aorta are sclerotic.  Sclerotic mitral valve leaflets with mild MAC.  Trace mitral regurgitation.  IVC is not well-visualized.  Trivial pericardial effusion.     PVR 6/28/2023: The right resting JOURDAN is normal.  The left resting JOURDAN is normal.     PVR 6/22/22: Resting JOURDAN within normal range-1.13 on the right and 0.9 on the left.  PVR is normal on the left and nondiagnostic on the right.     PVR lower extremity 6/23/2021: Normal ABIs bilaterally at rest.  History of kissing iliac stents.       Cardiac catheterization 3/30/2021: Discrete 90% stenosis in the mid LAD followed by tubular 70% stenosis.  Prior left circumflex stents patent.  Patient had successful NERI of LAD lesions.       Echo 3/10/2021:  Technically difficult study secondary to body habitus. Overall normal LV size with mild concentric LVH.  LVEF 65-70%.  No obvious regional wall  motion abnormalities but endocardium difficult to visualize.  Impaired relaxation. The RV  is difficult to visualize in subcostal views are of poor quality.  The RV is grossly normal in size and function in the apical views. · Trileaflet aortic valve.  No aortic stenosis.  Sclerotic mitral valve leaflets with trace MR.  No significant pericardial effusion.     Exercise MPI 3/10/2021:  Abnormal exercise nuclear stress test.  Reversible defects are noted in the lateral wall, apex, and anterior wall suggestive of ischemia.  No prior study for comparison.  LVEF 67%.  No transient ischemic dilatation.  EKG portion is negative for arrhythmias or ischemic changes.  Patient exercised on Abner protocol for 3 minutes 4.6 METS.  Poor exercise tolerance.  No chest pain reported.  Peak blood pressure 144/88 mmHg.  Patient achieved 92% of predicted maximal heart rate.     LE arterial duplex bilateral 7/22/2020: Triphasic waveforms throughout the lower extremity with at least two-vessel runoff to the foot.  The left lower extremity has triphasic waveforms with at least two-vessel runoff.  There is a probable stenosis at the origin of the profunda.  No significant change.       LE arterial duplex bilateral 7/17/2019: Triphasic and biphasic waveforms throughout the right and left lower extremities.  No evidence of discrete stenosis or obstruction.       Exercise stress echocardiogram 4/19/2019: Very technically difficult study.  Exercise EKG negative for ischemia or arrhythmia. Poor exercise capacity completing only stage I Abner protocol.  Exercise EKG negative for ischemia or arrhythmia at that workload.  At rest, normal LV size and function with LVEF 60-65%.  Basal mid inferior hypokinesis.  Mildly dilated RV with preserved function.  Trace TR.  Trace MR.  Trileaflet aortic valve.  No significant stenosis or insufficiency.  Prominent pericardial fat pad.  Immediately following exercise overall LV function improves.  Persistent  basal inferior hypokinesis consistent with scar. No ischemia.       PVR 1/9/2019: Right JOURDAN 1.12 at rest and 1.1 following exercise.  Left JOURDAN 0.95 at rest and 0.96 following exercise.  Normal segmental pressures and PVRs.       Periph Intervention 10/2017: Successful right iliac stent.     Abd Ao Duplex 9/2017: Elevated velocities right MICHAEL with monophasic flow.     Echo 9/2017: LVEF 65%, Top normal LV size with overall preserved function. No significant pulmonary hypertension by Doppler. Technically difficult study.     Cath 8/13/17: LAD tortuous, no significant disease. RCA dominant and patent. Left circumflex with 90% proximal lesion, 95% mid lesion and distal thrombus. Successful NERI ×2 left circumflex. Occluded right MICHAEL at origin with collaterals. Patent left MICHAEL and CFA    EKG: Sinus rhythm, unremarkable tracing.  No significant change.    Assessment/Plan     1. CAD in native artery    2. Peripheral vascular disease (CMS/[facility])    3. Claudication (CMS/[facility])    4. Type 2 diabetes mellitus without complication, without long-term current use of insulin (CMS/[facility])    5. Class 3 severe obesity due to excess calories with serious comorbidity and body mass index (BMI) of 40.0 to 44.9 in adult (CMS/[facility])    6. Essential hypertension    7. Abnormal EKG    8. Dyspnea on exertion       Mirela is doing well.  She has no evidence of volume overload, but does have aggressive coronary disease for a woman in her 50s.  She is trying to exercise more to lose weight and get her diabetes under better control.  I am concerned about her dyspnea with exertion since this has been a previous anginal equivalent.  She continues on aspirin and low-dose rivaroxaban without any bleeding.  She is on metoprolol 100 mg daily and now Jardiance.  Currently she is not on any injectable therapy, although this may help her with weight loss.    I would like to try to perform a pharmacologic stress test, but given her obesity and diabetes, I think she  would be better served with PET myocardial perfusion.  Previous studies have been technically difficult.  She needs pharmacologic testing secondary to poor exercise capacity.  She will have this performed at her earliest opportunity.    Stable peripheral vascular symptoms.  Continue current therapy.    Return in about 6 months (around 11/21/2024) for follow-up, earlier if any change in symptoms, and after studies have been completed.  Available medical records reviewed and updated including laboratory data, imaging studies, previous outpatient and inpatient records.  Counseling/education performed. Care everywhere utilized where appropriate.    Thank you for allowing me to participate in the care of this patient.  I hope this information is helpful.      Zara Mccloud MD, [facility], [facility]  5/23/2024  6:17 PM  This document was generated utilizing voice recognition technology. Please excuse any typographical errors which may be present.      By signing my name below, I, Gladys Ham, attest that this documentation has been prepared under the direction and in the presence of MD Gladys Galan  5/23/2024 6:17 PM

## 2024-06-26 ENCOUNTER — OFFICE VISIT (OUTPATIENT)
Dept: VASCULAR SURGERY | Facility: CLINIC | Age: 59
End: 2024-06-26
Payer: COMMERCIAL

## 2024-06-26 ENCOUNTER — HOSPITAL ENCOUNTER (OUTPATIENT)
Dept: CARDIOLOGY | Facility: CLINIC | Age: 59
Discharge: HOME | End: 2024-06-26
Attending: SURGERY
Payer: COMMERCIAL

## 2024-06-26 VITALS
DIASTOLIC BLOOD PRESSURE: 80 MMHG | SYSTOLIC BLOOD PRESSURE: 120 MMHG | WEIGHT: 231 LBS | HEIGHT: 64 IN | BODY MASS INDEX: 39.44 KG/M2

## 2024-06-26 VITALS
SYSTOLIC BLOOD PRESSURE: 120 MMHG | WEIGHT: 231 LBS | BODY MASS INDEX: 39.44 KG/M2 | HEIGHT: 64 IN | DIASTOLIC BLOOD PRESSURE: 80 MMHG

## 2024-06-26 DIAGNOSIS — I73.9 PERIPHERAL VASCULAR DISEASE (CMS/HCC): ICD-10-CM

## 2024-06-26 DIAGNOSIS — I73.9 PERIPHERAL VASCULAR DISEASE (CMS/HCC): Primary | ICD-10-CM

## 2024-06-26 LAB
BSA FOR ECHO PROCEDURE: 2.18 M2
LEFT 1ST TOE INDEX: 0.75
LEFT 1ST TOE: 101 MMHG
LEFT ABI: 0.97
LEFT ARM BP: 135 MMHG
LEFT DORSALIS PEDIS INDEX: 0.97
LEFT DORSALIS PEDIS: 131 MMHG
LEFT TBI: 0.75
RIGHT 1ST TOE INDEX: 0.78
RIGHT 1ST TOE: 105 MMHG
RIGHT ABI: 1.21
RIGHT ARM BP: 132 MMHG
RIGHT POST TIBIAL INDEX: 1.21
RIGHT POSTERIOR TIBIAL: 163 MMHG
RIGHT TBI: 0.78

## 2024-06-26 PROCEDURE — 3008F BODY MASS INDEX DOCD: CPT | Performed by: SURGERY

## 2024-06-26 PROCEDURE — 3074F SYST BP LT 130 MM HG: CPT | Performed by: SURGERY

## 2024-06-26 PROCEDURE — 3079F DIAST BP 80-89 MM HG: CPT | Performed by: SURGERY

## 2024-06-26 PROCEDURE — 99214 OFFICE O/P EST MOD 30 MIN: CPT | Performed by: SURGERY

## 2024-06-26 PROCEDURE — 93922 UPR/L XTREMITY ART 2 LEVELS: CPT | Performed by: SURGERY

## 2024-06-26 NOTE — PROGRESS NOTES
[facility]  Follow-up Office Note  @ [facility]        DETAILS OF CONSULTATION   2024  Mirela Lopez               YOB: 1965  [id number]    Consulting Service:  [facility]    PCP:  Nora Gudino DO    Diagnosis:  PAD     HISTORY OF PRESENT ILLNESS        Mirela Lopez  is a 59 y.o. female returning to the office for continued management of PAD, Mirela continues to do well.  She has remote history of iliac stenting.  She had noninvasive testing performed today her ankle indices were within normal ranges.  She has no recurrent claudication symptoms to report.    She does continue on Lipitor and Xarelto low-dose, and baby aspirin.    She is here for routine vascular check, has no significant complaints today.    PAST MEDICAL AND SURGICAL HISTORY        Past Medical History:   Diagnosis Date    GERD (gastroesophageal reflux disease)     Hyperlipidemia     Peripheral vascular disease (CMS/HCC) 2017    Right iliac claudication.  Aortogram demonstrated near occlusion of the right common iliac.  Successful right iliac stent 2017       Past Surgical History:   Procedure Laterality Date    CARDIAC CATHETERIZATION      LAD tortuous, no significant disease.  Dominant RCA-patent.  90% proximal cx, 95% mid cx and distal thrombus    CARPAL TUNNEL RELEASE       SECTION  2003    CORONARY STENT PLACEMENT  2017    NERI ×2 left circumflex-performed in [place]    ILIAC ARTERY STENT  10/2017    Dr. Ugalde    WISDOM TOOTH EXTRACTION         MEDICATIONS        Current Outpatient Medications on File Prior to Visit   Medication Sig Dispense Refill    aspirin 81 mg enteric coated tablet Take 81 mg by mouth daily.      atorvastatin (LIPITOR) 80 mg tablet Take 1 tablet (80 mg total) by mouth once daily. 90 tablet 3    blood sugar diagnostic strip Use on edaily 100 strip 6    empagliflozin (JARDIANCE) 25 mg tablet Take 1  "tablet (25 mg total) by mouth daily. 90 tablet 3    ferrous sulfate 134 mg (27 mg iron) tablet Take 1 tablet by mouth daily with breakfast.    6    glipiZIDE (GLUCOTROL XL) 5 mg 24 hr tablet Take 1 tablet (5 mg total) by mouth daily. 90 tablet 3    lancets 21 gauge misc 1 lancet daily. 100 each 6    lisinopriL (PRINIVIL) 10 mg tablet Take 1 tablet (10 mg total) by mouth once daily. 90 tablet 3    metFORMIN XR (GLUCOPHAGE-XR) 750 mg 24 hr tablet Take two daily 180 tablet 3    metoprolol succinate XL (TOPROL-XL) 100 mg 24 hr tablet Take 1 tablet (100 mg total) by mouth once daily. 90 tablet 3    nitroglycerin (NITROSTAT) 0.4 mg SL tablet Place 1 tablet (0.4 mg total) under the tongue every 5 (five) minutes as needed for chest pain. 25 tablet 3    rivaroxaban (XARELTO) 2.5 mg tablet tablet Take 1 tablet (2.5 mg total) by mouth 2 (two) times a day. 180 tablet 3     No current facility-administered medications on file prior to visit.       ALLERGIES        Adhesive and Sulfa (sulfonamide antibiotics)     PHYSICAL EXAMINATION      Visit Vitals  /80   Ht 1.626 m (5' 4\")   Wt 105 kg (231 lb)   BMI 39.65 kg/m²     Body mass index is 39.65 kg/m².      Physical Exam  Vitals reviewed.   Constitutional:       Appearance: She is well-developed.   HENT:      Head: Normocephalic and atraumatic.   Eyes:      Pupils: Pupils are equal, round, and reactive to light.   Neck:      Trachea: No tracheal deviation.   Cardiovascular:      Rate and Rhythm: Normal rate.   Pulmonary:      Effort: Pulmonary effort is normal.      Breath sounds: Normal breath sounds.   Abdominal:      Palpations: Abdomen is soft. There is no mass.   Musculoskeletal:         General: Normal range of motion.      Cervical back: Normal range of motion and neck supple.   Skin:     General: Skin is warm and dry.   Neurological:      Mental Status: She is alert and oriented to person, place, and time.         LABS / IMAGING / EKG        Imaging  I have " independently reviewed the pertinent imaging from the last 24 hrs. and Significant findings include:     PVR performed today June 20, 2023    Study Findings and Details    Study Details Non-invasive testing of the lower extremities including Doppler, pulse volume recordings and segmental limb pressures.        Vascular Findings    PVR Resting The right, low thigh PVR waveforms demonstrate a loss in amplitude and no dicrotic notch. The right calf PVR waveforms demonstrate a loss in amplitude and no dicrotic notch. The right ankle PVR waveforms demonstrate a loss in amplitude and no dicrotic notch. The right foot PVR waveforms demonstrate a loss in amplitude and no dicrotic notch. The right digit PVR waveforms demonstrate a loss in amplitude and no dicrotic notch. The left, low thigh PVR waveforms demonstrate a loss in amplitude and no dicrotic notch. The left calf PVR waveforms demonstrate a loss in amplitude and no dicrotic notch. The left ankle PVR waveforms demonstrate a loss in amplitude and no dicrotic notch. The left foot PVR waveforms demonstrate a loss in amplitude and no dicrotic notch. The left digit PVR waveforms demonstrate a loss in amplitude and no dicrotic notch.   Resting Study Conclusions:       Right resting JOURDAN of 1.25.        Left resting JOURDAN of 1.08.        Resting Study Measurements     R Pressure R Index L Pressure L Index   Arm 130 mmHg        - 118 mmHg        -   Low Thigh 185 mmHg        1.42        137 mmHg        1.05          Proximal Calf 162 mmHg        1.25        119 mmHg        0.92          Posterior Tibial 162 mmHg        1.25        141 mmHg        1.08          Dorsalis Pedis 135 mmHg        1.04        124 mmHg        0.95          JOURDAN - 1.25        - 1.08             Toe Brachial Index (TBI) Measurements     R Pressure R Index L Pressure L Index   1st Toe 102 mmHg        0.78        113 mmHg        0.87          TBI  0.78         0.87               Interpretation  Summary    Resting ankle-brachial indices are within normal range bilaterally at 1.21 on the right and 0.97 on the left.     Toe-brachial indices are 0.78 on the right and 0.75 on the left.     Study Findings and Details    Study Details Limited non-invasive testing of the lower extremities including Doppler signals and blood pressures at the arms and ankles.   Prior Study There is a prior study available for comparison performed on 6/28/2023.   [place] Act 112 Does not meet criteria.     Vascular Findings    JOURDAN Resting Study Conclusions:       Right resting JOURDAN of 1.21 is normal.        Left resting JOURDAN of 0.97 is mildly decreased.     Resting Study Measurements     R Pressure R Index L Pressure L Index   Arm 132 mmHg       - 135 mmHg       -   Posterior Tibial 163 mmHg       1.21                 Dorsalis Pedis         131 mmHg       0.97         JOURDAN - 1.21       - 0.97            Toe Brachial Index (TBI) Measurements     R Pressure R Index L Pressure L Index   1st Toe 105 mmHg       0.78       101 mmHg       0.75         TBI  0.78        0.75                  ASSESSMENT AND PLAN         Problem List Items Addressed This Visit          Circulatory    Peripheral vascular disease (CMS/HCC) - Primary    Overview     PAD status post iliac stenting in 2017.  Since that time has been doing well asymptomatic from a peripheral arterial standpoint.         Current Assessment & Plan     Will recommend repeat visit in 1 year  Continuing optimal medical management         Relevant Orders    Ultrasound PVR lower extremity            Return in about 1 year (around 6/26/2025) for JOURDAN.     Sandip Ugalde MD, FACS        Vascular and Endovascular Specialist  [place] [address]  Phone [phone]  Fax  [phone]     Thank you very much for allowing us to participate in the care of your patient.  Please not hesitate to call or email if there are any questions.  Sincerely.    This document  was generated utilizing voice recognition technology. An attempt at proofreading has been made to minimize errors but typographical errors may be present.

## 2024-07-09 ENCOUNTER — OFFICE VISIT (OUTPATIENT)
Dept: OBSTETRICS AND GYNECOLOGY | Facility: CLINIC | Age: 59
End: 2024-07-09
Payer: COMMERCIAL

## 2024-07-09 VITALS
WEIGHT: 233 LBS | BODY MASS INDEX: 39.78 KG/M2 | HEIGHT: 64 IN | SYSTOLIC BLOOD PRESSURE: 130 MMHG | DIASTOLIC BLOOD PRESSURE: 70 MMHG

## 2024-07-09 DIAGNOSIS — Z01.419 WELL WOMAN EXAM WITH ROUTINE GYNECOLOGICAL EXAM: Primary | ICD-10-CM

## 2024-07-09 DIAGNOSIS — Z12.31 BREAST CANCER SCREENING BY MAMMOGRAM: ICD-10-CM

## 2024-07-09 PROCEDURE — S0612 ANNUAL GYNECOLOGICAL EXAMINA: HCPCS | Performed by: OBSTETRICS & GYNECOLOGY

## 2024-07-09 NOTE — PROGRESS NOTES
Visit Date: 2024  Mirela Lopez is 59 y.o. female presenting today for Annual GYN Exam    HPI:  No LMP recorded. Patient is postmenopausal.  Menstrual Cycle: Patient's last menstrual period was approximately 6 years ago. She is not experiencing any vaginal bleeding, spotting, or pink discharge.  Sexually Activity: Patient is sexually active without difficulty.  Bowel and Bladder function: Normal per patient.  Pap Smears: does not have a history of abnormal pap smears, Last pap was in 2023 and was neg/neg.   Patient is not experiencing hot flashes.  Patient is not experiencing night sweats.  Patient is not experiencing vaginal dryness.  Patient  does not report any breast issues.  Patient's Last Mammogram was in 2023 and was  Bi-rads 1. Breast Density Type: A - Fatty. Frances Model Risk 17%  Patient's Last Dexa Scan: n/a  Patient does have a family history of breast or gyn cancers.  Patient  does not desire screening for STIs today.     Past Medical History:  has a past medical history of GERD (gastroesophageal reflux disease), Hyperlipidemia, Pap smear for cervical cancer screening (2023), and Peripheral vascular disease (CMS/[facility]) ().    She has no past medical history of Abnormal Pap smear of cervix.    Past Surgical History:  has a past surgical history that includes Carpal tunnel release;  section (2003); Iliac artery stent (10/2017); Cardiac catheterization (); Coronary stent placement (); and Bascom tooth extraction ().    Medications:   Current Outpatient Medications:     aspirin 81 mg enteric coated tablet, Take 81 mg by mouth daily., Disp: , Rfl:     atorvastatin (LIPITOR) 80 mg tablet, Take 1 tablet (80 mg total) by mouth once daily., Disp: 90 tablet, Rfl: 3    blood sugar diagnostic strip, Use on edaily, Disp: 100 strip, Rfl: 6    empagliflozin (JARDIANCE) 25 mg tablet, Take 1 tablet (25 mg total) by mouth daily., Disp: 90 tablet, Rfl: 3    ferrous sulfate 134 mg  (27 mg iron) tablet, Take 1 tablet by mouth daily with breakfast.  , Disp: , Rfl: 6    glipiZIDE (GLUCOTROL XL) 5 mg 24 hr tablet, Take 1 tablet (5 mg total) by mouth daily., Disp: 90 tablet, Rfl: 3    lancets 21 gauge misc, 1 lancet daily., Disp: 100 each, Rfl: 6    lisinopriL (PRINIVIL) 10 mg tablet, Take 1 tablet (10 mg total) by mouth once daily., Disp: 90 tablet, Rfl: 3    metFORMIN XR (GLUCOPHAGE-XR) 750 mg 24 hr tablet, Take two daily, Disp: 180 tablet, Rfl: 3    metoprolol succinate XL (TOPROL-XL) 100 mg 24 hr tablet, Take 1 tablet (100 mg total) by mouth once daily., Disp: 90 tablet, Rfl: 3    nitroglycerin (NITROSTAT) 0.4 mg SL tablet, Place 1 tablet (0.4 mg total) under the tongue every 5 (five) minutes as needed for chest pain., Disp: 25 tablet, Rfl: 3    rivaroxaban (XARELTO) 2.5 mg tablet tablet, Take 1 tablet (2.5 mg total) by mouth 2 (two) times a day., Disp: 180 tablet, Rfl: 3      Allergies: is allergic to adhesive and sulfa (sulfonamide antibiotics).     Family History: family history includes Breast cancer in her biological mother; Diabetes in her biological father; Diabetes type II in her maternal grandmother; Heart disease in her biological father; Hyperlipidemia in her biological father; Lung cancer in her biological mother.    Social History:   Social History     Tobacco Use    Smoking status: Former     Types: Cigarettes     Quit date: 2017     Years since quittin.9    Smokeless tobacco: Never   Vaping Use    Vaping Use: Never used   Substance Use Topics    Alcohol use: Yes     Comment: Occasional social alcohol    Drug use: No       Review of Systems  Constitutional:   No hot flashes.   No night sweats.   No unexpected weight changes.  HENT:   No oral ulcers.   No headaches related to menstrual cycle.   Breasts:   No changes to skin of the breast or nipple.   No nipple discharge.   No breast lumps/cysts.   No breast pain.   Patient has not noticed any changes to breasts.  "  Respiratory:   No shortness of breath.  Cardiovascular:   No chest pain  Gastrointestinal:   No changes in bowel habits.  Genitourinary:   No pain with urination.   No urgency with urination.   No increased frequency of urination.   + urinary incontinence.   No vaginal dryness.  No vaginal discharge.   No painful intercourse.   No genital sores.   No irregular menstrual cycles.   No heavy menstrual cycles.   No painful menstrual periods.   No bleeding between menstrual cycles.   No bleeding after intercourse.  No absence of menstrual periods.  Integument:   No changes to any existing genital skin lesions or moles.   No new vaginal skin lesions or moles.   No genital rashes.   Endocrine:   No increased hair growth   Psychiatric:   No anxiety.   No depression.   No suicidal ideation.  Patient does not have concerns for her safety.   Heme-Lymph:   No easy bruising.   No unexplained lumps.       Visit Vitals  /70   Ht 1.626 m (5' 4\")   Wt 106 kg (233 lb)   BMI 39.99 kg/m²       OBGyn Exam  General Appearance: Alert, cooperative, no acute distress  Head: Normocephalic, without obvious abnormality  Breast: Right breast normal without mass, skin or nipple changes or axillary nodes. Left breast normal without mass, skin or nipple changes or axillary nodes.  Abdomen: Soft, nontender, nondistended,no masses, no organomegaly  Pelvic exam: VULVA: normal appearing vulva with no masses, tenderness or lesions. VAGINA: normal appearing vagina with normal color and discharge, no lesions. CERVIX: normal appearing cervix without discharge or lesions. UTERUS: uterus is normal size, shape, consistency and non-tender. ADNEXA: normal adnexa in size, nontender and no masses.  Extremities: no edema     Assessment and Plan:  59 y.o. yo presents for an annual exam.   1. Pap  not collected today.  2. STI Testing: GC/CT declined. HIV/SA/Hep B declined.  3. Mammogram slip provided, reminded to schedule  4. Dexa slip not indicated today. "   6. Return to office 1 year or prn  7. Patient to call for results in 7-10 days.     Gladys Kunz,

## 2024-07-22 DIAGNOSIS — E11.9 TYPE 2 DIABETES MELLITUS WITHOUT COMPLICATION, WITHOUT LONG-TERM CURRENT USE OF INSULIN (CMS/HCC): ICD-10-CM

## 2024-07-22 RX ORDER — METFORMIN HYDROCHLORIDE 750 MG/1
TABLET, EXTENDED RELEASE ORAL
Qty: 180 TABLET | Refills: 3 | Status: SHIPPED | OUTPATIENT
Start: 2024-07-22 | End: 2025-03-03 | Stop reason: SDUPTHER

## 2024-07-22 RX ORDER — GLIPIZIDE 5 MG/1
5 TABLET, FILM COATED, EXTENDED RELEASE ORAL DAILY
Qty: 90 TABLET | Refills: 3 | Status: SHIPPED | OUTPATIENT
Start: 2024-07-22 | End: 2025-03-03 | Stop reason: SDUPTHER

## 2024-08-12 ENCOUNTER — HOSPITAL ENCOUNTER (OUTPATIENT)
Dept: RADIOLOGY | Facility: HOSPITAL | Age: 59
Discharge: HOME | End: 2024-08-12
Attending: OBSTETRICS & GYNECOLOGY
Payer: COMMERCIAL

## 2024-08-12 DIAGNOSIS — Z12.31 BREAST CANCER SCREENING BY MAMMOGRAM: ICD-10-CM

## 2024-08-12 PROCEDURE — 77063 BREAST TOMOSYNTHESIS BI: CPT

## 2024-08-15 ENCOUNTER — TELEPHONE (OUTPATIENT)
Dept: CARDIOLOGY | Facility: CLINIC | Age: 59
End: 2024-08-15
Payer: COMMERCIAL

## 2024-08-15 NOTE — TELEPHONE ENCOUNTER
Spoke to patient to remind of appointment for Cardiac PET CT Stress test on Monday, reviewed time, location, address, and where to check in, informed patient of what to expect for test, reviewed important patient preparation instructions, and answered patients questions.

## 2024-08-19 ENCOUNTER — HOSPITAL ENCOUNTER (OUTPATIENT)
Dept: CARDIOLOGY | Facility: CLINIC | Age: 59
End: 2024-08-19
Attending: INTERNAL MEDICINE
Payer: COMMERCIAL

## 2024-08-21 SDOH — ECONOMIC STABILITY: FOOD INSECURITY: WITHIN THE PAST 12 MONTHS, YOU WORRIED THAT YOUR FOOD WOULD RUN OUT BEFORE YOU GOT MONEY TO BUY MORE.: NEVER TRUE

## 2024-08-21 SDOH — ECONOMIC STABILITY: FOOD INSECURITY: WITHIN THE PAST 12 MONTHS, THE FOOD YOU BOUGHT JUST DIDN'T LAST AND YOU DIDN'T HAVE MONEY TO GET MORE.: NEVER TRUE

## 2024-08-21 SDOH — ECONOMIC STABILITY: INCOME INSECURITY: IN THE LAST 12 MONTHS, WAS THERE A TIME WHEN YOU WERE NOT ABLE TO PAY THE MORTGAGE OR RENT ON TIME?: NO

## 2024-08-21 SDOH — ECONOMIC STABILITY: TRANSPORTATION INSECURITY
IN THE PAST 12 MONTHS, HAS THE LACK OF TRANSPORTATION KEPT YOU FROM MEDICAL APPOINTMENTS OR FROM GETTING MEDICATIONS?: NO

## 2024-08-21 SDOH — ECONOMIC STABILITY: TRANSPORTATION INSECURITY
IN THE PAST 12 MONTHS, HAS LACK OF TRANSPORTATION KEPT YOU FROM MEETINGS, WORK, OR FROM GETTING THINGS NEEDED FOR DAILY LIVING?: NO

## 2024-08-21 ASSESSMENT — SOCIAL DETERMINANTS OF HEALTH (SDOH): IN THE PAST 12 MONTHS, HAS THE ELECTRIC, GAS, OIL, OR WATER COMPANY THREATENED TO SHUT OFF SERVICE IN YOUR HOME?: NO

## 2024-08-28 ENCOUNTER — OFFICE VISIT (OUTPATIENT)
Dept: PRIMARY CARE | Facility: CLINIC | Age: 59
End: 2024-08-28
Payer: COMMERCIAL

## 2024-08-28 VITALS
HEIGHT: 64 IN | SYSTOLIC BLOOD PRESSURE: 127 MMHG | TEMPERATURE: 97.2 F | HEART RATE: 88 BPM | OXYGEN SATURATION: 98 % | BODY MASS INDEX: 39.03 KG/M2 | DIASTOLIC BLOOD PRESSURE: 78 MMHG | WEIGHT: 228.6 LBS

## 2024-08-28 DIAGNOSIS — Z12.11 COLON CANCER SCREENING: ICD-10-CM

## 2024-08-28 DIAGNOSIS — E61.1 IRON DEFICIENCY: ICD-10-CM

## 2024-08-28 DIAGNOSIS — E11.9 TYPE 2 DIABETES MELLITUS WITHOUT COMPLICATION, WITHOUT LONG-TERM CURRENT USE OF INSULIN (CMS/HCC): ICD-10-CM

## 2024-08-28 DIAGNOSIS — E78.00 PURE HYPERCHOLESTEROLEMIA: ICD-10-CM

## 2024-08-28 DIAGNOSIS — D76.3 ROSAI-DORFMAN DISEASE (CMS/HCC): ICD-10-CM

## 2024-08-28 DIAGNOSIS — Z00.00 ENCOUNTER FOR PREVENTIVE CARE: Primary | ICD-10-CM

## 2024-08-28 PROCEDURE — 3074F SYST BP LT 130 MM HG: CPT | Performed by: INTERNAL MEDICINE

## 2024-08-28 PROCEDURE — 3008F BODY MASS INDEX DOCD: CPT | Performed by: INTERNAL MEDICINE

## 2024-08-28 PROCEDURE — 99396 PREV VISIT EST AGE 40-64: CPT | Mod: 25 | Performed by: INTERNAL MEDICINE

## 2024-08-28 PROCEDURE — 3078F DIAST BP <80 MM HG: CPT | Performed by: INTERNAL MEDICINE

## 2024-08-28 RX ORDER — TIRZEPATIDE 2.5 MG/.5ML
2.5 INJECTION, SOLUTION SUBCUTANEOUS
Qty: 2 ML | Refills: 1 | Status: SHIPPED | OUTPATIENT
Start: 2024-08-28 | End: 2024-10-30 | Stop reason: SDUPTHER

## 2024-08-28 ASSESSMENT — PATIENT HEALTH QUESTIONNAIRE - PHQ9: SUM OF ALL RESPONSES TO PHQ9 QUESTIONS 1 & 2: 0

## 2024-08-28 NOTE — PROGRESS NOTES
"    Subjective      Patient ID: Mirela Lopez is a 59 y.o. female.  1965      Physical Exam:    BMI 39- diet can be better but did lose 5 pounds.  Had done a lot of walking on vacation  CV sent pt here to begin GLP  Depression screening today negative    Mammogram 8/12/2024 negative  GYN Sees Dr Kunz 1/9/2023 negative up to date   Up to date with podiatry   Up to date w retinal exam no retinopathy no report to review   Colonoscopy declined amenable to FIT test     Immunization status:   PNA 23 5/24/2016  Tdap 5/28/2021  FLU not yet   COVID up to date due now   Shingles due         The following have been reviewed and updated as appropriate in this visit:   Allergies  Meds  Problems       Review of Systems   All other systems reviewed and are negative.      Objective     Vitals:    08/28/24 0945   BP: 127/78   BP Location: Right upper arm   Patient Position: Sitting   Pulse: 88   Temp: 36.2 °C (97.2 °F)   SpO2: 98%   Weight: 104 kg (228 lb 9.6 oz)   Height: 1.626 m (5' 4\")     Body mass index is 39.24 kg/m².    Physical Exam  Vitals reviewed.   Constitutional:       Appearance: Normal appearance. She is well-developed.   HENT:      Head: Normocephalic and atraumatic.      Right Ear: Tympanic membrane, ear canal and external ear normal.      Left Ear: Tympanic membrane, ear canal and external ear normal.      Mouth/Throat:      Mouth: Mucous membranes are moist.      Pharynx: Oropharynx is clear.   Eyes:      Conjunctiva/sclera: Conjunctivae normal.   Cardiovascular:      Rate and Rhythm: Normal rate and regular rhythm.      Heart sounds: Normal heart sounds.   Pulmonary:      Effort: Pulmonary effort is normal.      Breath sounds: Normal breath sounds.   Abdominal:      General: Abdomen is flat. Bowel sounds are normal.      Palpations: Abdomen is soft.   Musculoskeletal:         General: No swelling.      Cervical back: Normal range of motion and neck supple.   Skin:     General: Skin is warm and dry. "   Neurological:      General: No focal deficit present.      Mental Status: She is alert and oriented to person, place, and time.   Psychiatric:         Mood and Affect: Mood normal.         Behavior: Behavior normal.         Thought Content: Thought content normal.         Judgment: Judgment normal.         Assessment/Plan   Diagnoses and all orders for this visit:    Encounter for preventive care (Primary)  -     CBC; Future  -     Comprehensive metabolic panel; Future  -     Lipid panel; Future  -     Urinalysis with microscopic; Future  FIT test  Immunizations to get provided     Type 2 diabetes mellitus without complication, without long-term current use of insulin (CMS/[facility])  -     tirzepatide (MOUNJARO) 2.5 mg/0.5 mL pen injector; Inject 0.5 mL (2.5 mg total) under the skin every (seven) 7 days.  -     Comprehensive metabolic panel; Future  -     Microalbumin / creatinine urine ratio; Future  -     Hemoglobin A1c; Future    Iron deficiency  -     CBC; Future  -     Iron and TIBC; Future  -     Ferritin; Future    Rosai-Ileana disease (CMS/HCC)  Stable     Pure hypercholesterolemia  -     Comprehensive metabolic panel; Future  -     Lipid panel; Future  -     TSH w reflex FT4; Future    Colon cancer screening  -     FIT Test; Future

## 2024-09-05 ENCOUNTER — TELEPHONE (OUTPATIENT)
Dept: PRIMARY CARE | Facility: CLINIC | Age: 59
End: 2024-09-05
Payer: COMMERCIAL

## 2024-09-05 NOTE — TELEPHONE ENCOUNTER
----- Message from Nakia Simental RN sent at 9/5/2024  7:57 AM EDT -----  Regarding: FW: Script Rejection   Contact: [phone]    ----- Message -----  From: Mirela Lopez  Sent: 9/4/2024   1:46 PM EDT  To: [facility] Clinical Support Pool  Subject: Script Rejection                                 As predicted insurance company rejected and said they need to you to complete paperwork. Aman said that they called you, but I just want to follow up here

## 2024-10-01 ENCOUNTER — TELEPHONE (OUTPATIENT)
Dept: CARDIOLOGY | Facility: CLINIC | Age: 59
End: 2024-10-01
Payer: COMMERCIAL

## 2024-10-01 NOTE — TELEPHONE ENCOUNTER
Spoke to patient to remind of appointment for Cardiac PET CT Stress test, reviewed date, time, location, address, and where to check in, informed patient of what to expect for test, reviewed important patient preparation instructions, and answered patients questions.

## 2024-10-03 ENCOUNTER — HOSPITAL ENCOUNTER (OUTPATIENT)
Dept: CARDIOLOGY | Facility: CLINIC | Age: 59
Discharge: HOME | End: 2024-10-03
Attending: INTERNAL MEDICINE
Payer: COMMERCIAL

## 2024-10-03 VITALS — HEIGHT: 64 IN | BODY MASS INDEX: 39.78 KG/M2 | WEIGHT: 233 LBS

## 2024-10-03 DIAGNOSIS — E11.9 TYPE 2 DIABETES MELLITUS WITHOUT COMPLICATION, WITHOUT LONG-TERM CURRENT USE OF INSULIN (CMS/HCC): ICD-10-CM

## 2024-10-03 DIAGNOSIS — E66.01 CLASS 3 SEVERE OBESITY DUE TO EXCESS CALORIES WITH SERIOUS COMORBIDITY AND BODY MASS INDEX (BMI) OF 40.0 TO 44.9 IN ADULT (CMS/HCC): ICD-10-CM

## 2024-10-03 DIAGNOSIS — R94.31 ABNORMAL EKG: ICD-10-CM

## 2024-10-03 DIAGNOSIS — I25.10 CAD IN NATIVE ARTERY: ICD-10-CM

## 2024-10-03 DIAGNOSIS — E66.813 CLASS 3 SEVERE OBESITY DUE TO EXCESS CALORIES WITH SERIOUS COMORBIDITY AND BODY MASS INDEX (BMI) OF 40.0 TO 44.9 IN ADULT (CMS/HCC): ICD-10-CM

## 2024-10-03 LAB
CORONARY BLOOD FLOW AT REST: 0.9
CORONARY BLOOD FLOW WITH STRESS: 1.8
CTDI: 22.17 MGY
CV NM PET RUBIDIUM REST DOSE: 26.8 MCI
CV NM PET RUBIDIUM STRESS DOSE: 26.8 MCI
DOSE LENGTH PRODUCT: 402 MGYCM
EJECTION FRACTION RESERVE: 8 %
GLOBAL BLOOD FLOW RESERVE: 2.1
MLH CV NM REST ADMIN DATE: NORMAL MM/DD/YYYY
MLH CV NM REST ADMIN TIME: NORMAL HR:MIN
MLH CV NM STRESS ADMIN DATE: NORMAL MM/DD/YYYY
MLH CV NM STRESS ADMIN TIME: 26.77 HR:MIN
NUC REST EJECTION FRACTION: 53 %
NUC STRESS EJECTION FRACTION: 61 %
STRESS BASELINE BP: NORMAL MMHG
STRESS BASELINE HR: 73 BPM
STRESS PERCENT HR: 64 %
STRESS POST PEAK BP: NORMAL MMHG
STRESS POST PEAK HR: 104 BPM
STRESS TARGET HR: 139 BPM

## 2024-10-03 PROCEDURE — 78434 AQMBF PET REST & RX STRESS: CPT | Performed by: INTERNAL MEDICINE

## 2024-10-03 PROCEDURE — 78431 MYOCRD IMG PET RST&STRS CT: CPT | Performed by: INTERNAL MEDICINE

## 2024-10-03 PROCEDURE — A9555 RB82 RUBIDIUM: HCPCS | Performed by: INTERNAL MEDICINE

## 2024-10-03 PROCEDURE — 93015 CV STRESS TEST SUPVJ I&R: CPT | Performed by: INTERNAL MEDICINE

## 2024-10-03 RX ORDER — REGADENOSON 0.08 MG/ML
0.4 INJECTION, SOLUTION INTRAVENOUS ONCE
Status: COMPLETED | OUTPATIENT
Start: 2024-10-03 | End: 2024-10-03

## 2024-10-03 RX ADMIN — REGADENOSON 0.4 MG: 0.08 INJECTION, SOLUTION INTRAVENOUS at 08:44

## 2024-10-08 DIAGNOSIS — N95.0 PMB (POSTMENOPAUSAL BLEEDING): Primary | ICD-10-CM

## 2024-10-14 ENCOUNTER — HOSPITAL ENCOUNTER (OUTPATIENT)
Dept: RADIOLOGY | Facility: HOSPITAL | Age: 59
Discharge: HOME | End: 2024-10-14
Attending: OBSTETRICS & GYNECOLOGY
Payer: COMMERCIAL

## 2024-10-14 DIAGNOSIS — N95.0 PMB (POSTMENOPAUSAL BLEEDING): ICD-10-CM

## 2024-10-14 PROCEDURE — 76856 US EXAM PELVIC COMPLETE: CPT

## 2024-10-25 ENCOUNTER — TELEPHONE (OUTPATIENT)
Dept: OBSTETRICS AND GYNECOLOGY | Facility: CLINIC | Age: 59
End: 2024-10-25
Payer: COMMERCIAL

## 2024-10-25 NOTE — TELEPHONE ENCOUNTER
Spoke with thang regarding ultrasound findings and concern with thickened endometrial stripe. Discussed options for endometrial sampling, would like to start with office sampling. Will get her in on Tuesday 10/29 for office EMB.

## 2024-10-25 NOTE — TELEPHONE ENCOUNTER
----- Message from Yuriy Taveras sent at 10/25/2024  1:36 PM EDT -----  Take a look at this report. Came to me but I have never seen her.  ----- Message -----  From: Interface, Radiology Results In  Sent: 10/15/2024   6:10 PM EDT  To: Yuriy Taveras MD

## 2024-10-29 ENCOUNTER — OFFICE VISIT (OUTPATIENT)
Dept: OBSTETRICS AND GYNECOLOGY | Facility: CLINIC | Age: 59
End: 2024-10-29
Payer: COMMERCIAL

## 2024-10-29 VITALS — BODY MASS INDEX: 38.55 KG/M2 | WEIGHT: 224.6 LBS | DIASTOLIC BLOOD PRESSURE: 72 MMHG | SYSTOLIC BLOOD PRESSURE: 114 MMHG

## 2024-10-29 DIAGNOSIS — N95.0 POST-MENOPAUSAL BLEEDING: Primary | ICD-10-CM

## 2024-10-29 PROCEDURE — 58100 BIOPSY OF UTERUS LINING: CPT | Performed by: OBSTETRICS & GYNECOLOGY

## 2024-10-29 PROCEDURE — 99999 PR OFFICE/OUTPT VISIT,PROCEDURE ONLY: CPT | Performed by: OBSTETRICS & GYNECOLOGY

## 2024-10-29 NOTE — PROCEDURES
BX Endometrial    Date/Time: 10/29/2024 11:23 AM    Performed by: Gladys Kunz DO  Authorized by: Gladys Kunz DO    Consent:     Consent obtained:  Verbal and written    Consent given by:  Patient    Procedural risks discussed:  Bleeding, infection, damage to other organs and uterine perforation    Patient questions answered: yes      Patient agrees, verbalizes understanding, and wants to proceed: yes    Universal protocol:     Patient states understanding of procedure being performed: yes    Indication:     Chronicity of post-menopausal bleeding:  New  Findings:     Uterus size:  6-8 weeks    Cervix: normal      Adnexa: normal    Comments:     Procedure comments:  Procedure completed per usual protocol without difficulty. Will call with results and any indicated next steps.  Procedure:     A bivalve speculum was placed in the vagina: yes      Cervix cleaned and prepped: yes      A paracervical block was performed: no      Local anesthetic:     Total amount used (mL):  0    Uterus sounded      Uterus sound depth (cm): 6   Specimen collected: specimen collected and sent to pathology      Patient tolerated procedure well with no complications: yes    Estimated blood loss: minimal

## 2024-10-30 DIAGNOSIS — E11.9 TYPE 2 DIABETES MELLITUS WITHOUT COMPLICATION, WITHOUT LONG-TERM CURRENT USE OF INSULIN (CMS/HCC): ICD-10-CM

## 2024-10-30 RX ORDER — TIRZEPATIDE 2.5 MG/.5ML
2.5 INJECTION, SOLUTION SUBCUTANEOUS
Qty: 2 ML | Refills: 1 | Status: SHIPPED | OUTPATIENT
Start: 2024-10-30 | End: 2025-01-06

## 2024-11-04 ENCOUNTER — TELEPHONE (OUTPATIENT)
Dept: OBSTETRICS AND GYNECOLOGY | Facility: CLINIC | Age: 59
End: 2024-11-04
Payer: COMMERCIAL

## 2024-11-04 LAB
DX ICD CODE: NORMAL
PATH REPORT.FINAL DX SPEC: NORMAL
PATH REPORT.GROSS SPEC: NORMAL
PATH REPORT.SITE OF ORIGIN SPEC: NORMAL
PATHOLOGIST NAME: NORMAL
PAYMENT PROCEDURE: NORMAL

## 2024-11-04 NOTE — TELEPHONE ENCOUNTER
Called thang to review results, no answer. Left VM that initial biopsy neg and will call back to review further and discuss next steps.

## 2024-11-05 ENCOUNTER — TELEPHONE (OUTPATIENT)
Dept: OBSTETRICS AND GYNECOLOGY | Facility: CLINIC | Age: 59
End: 2024-11-05
Payer: COMMERCIAL

## 2024-11-05 NOTE — TELEPHONE ENCOUNTER
Spoke with Mirela regarding her EMB results. Explained benign but given scant endometrium and significantly thickened EMS I am suspicious there may be a discrete polyp not sampled. Recommended we proceed with D&C hysteroscopy which she is agreeable. Due to some schedule constraints, jan 2nd is soonest she can proceed. We will work on scheduling.questions answered

## 2024-12-16 ENCOUNTER — CONSULT (OUTPATIENT)
Dept: OBSTETRICS AND GYNECOLOGY | Facility: CLINIC | Age: 59
End: 2024-12-16
Payer: COMMERCIAL

## 2024-12-16 VITALS
WEIGHT: 227.8 LBS | OXYGEN SATURATION: 96 % | HEART RATE: 87 BPM | BODY MASS INDEX: 39.1 KG/M2 | DIASTOLIC BLOOD PRESSURE: 78 MMHG | SYSTOLIC BLOOD PRESSURE: 120 MMHG

## 2024-12-16 DIAGNOSIS — Z01.818 PRE-OP TESTING: Primary | ICD-10-CM

## 2024-12-16 PROCEDURE — 3078F DIAST BP <80 MM HG: CPT | Performed by: OBSTETRICS & GYNECOLOGY

## 2024-12-16 PROCEDURE — 3074F SYST BP LT 130 MM HG: CPT | Performed by: OBSTETRICS & GYNECOLOGY

## 2024-12-16 PROCEDURE — 3008F BODY MASS INDEX DOCD: CPT | Performed by: OBSTETRICS & GYNECOLOGY

## 2024-12-16 PROCEDURE — 99214 OFFICE O/P EST MOD 30 MIN: CPT | Mod: 57 | Performed by: OBSTETRICS & GYNECOLOGY

## 2024-12-16 NOTE — PROGRESS NOTES
Patient ID: Mirela Lopez   : 1965 59 y.o.  MRN: [id number]   Visit Date: 2024    Subjective   Mirela Lopez is presenting today for Pre-op Exam    In October, Mirela had an episode of PMB. Her TVUS showed a thickened EMS measuring 13 mm with cystic changes. Office EMB was benign, however scant specimen and as such recommendation made for more thorough sampling with D&C. She has not had any further bleeding since her initial episode. She is scheduled to undergo D&C hysteroscopy, possible polypectomy/myomectomy on 25.     Past Medical History:  has a past medical history of GERD (gastroesophageal reflux disease), Hyperlipidemia, Pap smear for cervical cancer screening (2023), and Peripheral vascular disease (CMS/[facility]) ().    She has no past medical history of Abnormal Pap smear of cervix.     Past Surgical History:  has a past surgical history that includes Carpal tunnel release;  section (2003); Iliac artery stent (10/2017); Cardiac catheterization (); Coronary stent placement (); and [place] tooth extraction ().    Obstetric History:   OB History    Para Term  AB Living   1 1 1     1   SAB IAB Ectopic Multiple Live Births           1      # Outcome Date GA Lbr Brian/2nd Weight Sex Type Anes PTL Lv   1 Term      CS-[id number]   SILVESTRE       Family History: family history includes Breast cancer in her biological mother; Diabetes in her biological father; Diabetes type II in her maternal grandmother; Heart disease in her biological father; Hyperlipidemia in her biological father; Lung cancer in her biological mother.    Medications: Current Medications[1]      Allergies: is allergic to adhesive and sulfa (sulfonamide antibiotics).     All relevant medical record documentation and testing reviewed.    Vital Signs for this encounter: Visit Vitals  /78 (BP Location: Left upper arm, Patient Position: Sitting)   Pulse 87   Wt 103 kg (227 lb 12.8 oz)   SpO2 96%    BMI 39.10 kg/m²       Chaperone present for examination.    OBGyn Exam  General Appearance: Alert, cooperative, no acute distress  Head: Normocephalic, without obvious abnormality  Cards: RRR  Lungs: CTA BL  Breast: Deferred  Abdomen: Soft, nontender, nondistended,no masses, no organomegaly  Pelvic exam:  deferred  Extremities: no edema    Impression/Plan:    59 y.o. is presenting today for Pre-op Exam    - Reviewed purpose of anticipated procedure, logistics for day of surgery and expected post operative recovery  - I personally counseled the patient on the risk of surgery which includes but is not limited to: Infection, bleeding, conversion to laparotomy, wound breakdown, injury to visceral organs such as bowel, bladder, ureters, blood vessels, nerves, VTE, MI, stroke, need for blood transfusion, and other unforeseen circumstances.  She is at increased risk of sandie-operative complications due to prior  section, diabetes, heart disease, and obesity.  All of her questions were answered to her satisfaction, and informed consent was signed.  - Mirela has significant cardiac history. She sees her cardiologist on Monday. She will request cardiac clearance based upon that visit. Instructed to continue metoprolol through procedure day. Ideally hold Xarelto and ASA at least 3 days pre-op if cardiology okay with this. Other meds can hold day of procedure but take up through day before.  - Has PAT appt on Wednesday. Provided with lab slip for CBC/BMP/T&S. Did not order EKG as mirela believes she will have this at her cardiology visit.  - Questions answered.    Gladys Kunz DO       [1]   Current Outpatient Medications:     aspirin 81 mg enteric coated tablet, Take 81 mg by mouth daily., Disp: , Rfl:     atorvastatin (LIPITOR) 80 mg tablet, Take 1 tablet (80 mg total) by mouth once daily. (Patient not taking: Reported on 10/29/2024), Disp: 90 tablet, Rfl: 3    blood sugar diagnostic strip, Use on edaily, Disp:  100 strip, Rfl: 6    empagliflozin (JARDIANCE) 25 mg tablet, Take 1 tablet (25 mg total) by mouth daily., Disp: 90 tablet, Rfl: 3    ferrous sulfate 134 mg (27 mg iron) tablet, Take 1 tablet by mouth daily with breakfast.  , Disp: , Rfl: 6    glipiZIDE (GLUCOTROL XL) 5 mg 24 hr tablet, Take 1 tablet (5 mg total) by mouth daily., Disp: 90 tablet, Rfl: 3    lancets 21 gauge misc, 1 lancet daily. (Patient not taking: Reported on 10/29/2024), Disp: 100 each, Rfl: 6    lisinopriL (PRINIVIL) 10 mg tablet, Take 1 tablet (10 mg total) by mouth once daily., Disp: 90 tablet, Rfl: 3    metFORMIN XR (GLUCOPHAGE-XR) 750 mg 24 hr tablet, TAKE TWO TABLETS BY MOUTH DAILY, Disp: 180 tablet, Rfl: 3    metoprolol succinate XL (TOPROL-XL) 100 mg 24 hr tablet, Take 1 tablet (100 mg total) by mouth once daily., Disp: 90 tablet, Rfl: 3    nitroglycerin (NITROSTAT) 0.4 mg SL tablet, Place 1 tablet (0.4 mg total) under the tongue every 5 (five) minutes as needed for chest pain., Disp: 25 tablet, Rfl: 3    rivaroxaban (XARELTO) 2.5 mg tablet tablet, Take 1 tablet (2.5 mg total) by mouth 2 (two) times a day., Disp: 180 tablet, Rfl: 3    tirzepatide (MOUNJARO) 2.5 mg/0.5 mL pen injector, Inject 0.5 mL (2.5 mg total) under the skin every (seven) 7 days., Disp: 2 mL, Rfl: 1

## 2024-12-18 ENCOUNTER — APPOINTMENT (OUTPATIENT)
Dept: LAB | Facility: HOSPITAL | Age: 59
End: 2024-12-18
Attending: OBSTETRICS & GYNECOLOGY
Payer: COMMERCIAL

## 2024-12-18 DIAGNOSIS — Z01.818 PRE-OP TESTING: ICD-10-CM

## 2024-12-18 LAB
ABO + RH BLD: NORMAL
ANION GAP SERPL CALC-SCNC: 8 MEQ/L (ref 3–15)
BLD GP AB SCN SERPL QL: NEGATIVE
BLOOD BANK CMNT PATIENT-IMP: NORMAL
BUN SERPL-MCNC: 10 MG/DL (ref 7–25)
CALCIUM SERPL-MCNC: 9.2 MG/DL (ref 8.6–10.3)
CHLORIDE SERPL-SCNC: 102 MEQ/L (ref 98–107)
CO2 SERPL-SCNC: 29 MEQ/L (ref 21–31)
CREAT SERPL-MCNC: 0.5 MG/DL (ref 0.6–1.2)
D AG BLD QL: NEGATIVE
EGFRCR SERPLBLD CKD-EPI 2021: >60 ML/MIN/1.73M*2
ERYTHROCYTE [DISTWIDTH] IN BLOOD BY AUTOMATED COUNT: 13.5 % (ref 11.7–14.4)
GLUCOSE SERPL-MCNC: 132 MG/DL (ref 70–99)
HCT VFR BLD AUTO: 38.6 % (ref 35–45)
HGB BLD-MCNC: 12.5 G/DL (ref 11.8–15.7)
LABORATORY COMMENT REPORT: NORMAL
MCH RBC QN AUTO: 29.1 PG (ref 28–33.2)
MCHC RBC AUTO-ENTMCNC: 32.4 G/DL (ref 32.2–35.5)
MCV RBC AUTO: 90 FL (ref 83–98)
PLATELET # BLD AUTO: 185 K/UL (ref 150–369)
PMV BLD AUTO: 9.1 FL (ref 9.4–12.3)
POTASSIUM SERPL-SCNC: 4 MEQ/L (ref 3.5–5.1)
RBC # BLD AUTO: 4.29 M/UL (ref 3.93–5.22)
SODIUM SERPL-SCNC: 139 MEQ/L (ref 136–145)
SPECIMEN EXP DATE BLD: NORMAL
WBC # BLD AUTO: 8.73 K/UL (ref 3.8–10.5)

## 2024-12-18 PROCEDURE — 80048 BASIC METABOLIC PNL TOTAL CA: CPT

## 2024-12-18 PROCEDURE — 86900 BLOOD TYPING SEROLOGIC ABO: CPT

## 2024-12-18 PROCEDURE — 85027 COMPLETE CBC AUTOMATED: CPT

## 2024-12-18 PROCEDURE — 36415 COLL VENOUS BLD VENIPUNCTURE: CPT

## 2024-12-23 ENCOUNTER — OFFICE VISIT (OUTPATIENT)
Dept: CARDIOLOGY | Facility: CLINIC | Age: 59
End: 2024-12-23
Payer: COMMERCIAL

## 2024-12-23 VITALS
DIASTOLIC BLOOD PRESSURE: 74 MMHG | RESPIRATION RATE: 16 BRPM | SYSTOLIC BLOOD PRESSURE: 122 MMHG | WEIGHT: 227 LBS | HEART RATE: 73 BPM | BODY MASS INDEX: 38.76 KG/M2 | HEIGHT: 64 IN

## 2024-12-23 DIAGNOSIS — I25.10 CAD IN NATIVE ARTERY: Primary | ICD-10-CM

## 2024-12-23 DIAGNOSIS — I73.9 CLAUDICATION (CMS/HCC): ICD-10-CM

## 2024-12-23 DIAGNOSIS — E66.813 CLASS 3 SEVERE OBESITY DUE TO EXCESS CALORIES WITH SERIOUS COMORBIDITY AND BODY MASS INDEX (BMI) OF 40.0 TO 44.9 IN ADULT (CMS/HCC): ICD-10-CM

## 2024-12-23 DIAGNOSIS — E11.9 TYPE 2 DIABETES MELLITUS WITHOUT COMPLICATION, WITHOUT LONG-TERM CURRENT USE OF INSULIN (CMS/HCC): ICD-10-CM

## 2024-12-23 DIAGNOSIS — R94.31 ABNORMAL EKG: ICD-10-CM

## 2024-12-23 DIAGNOSIS — Z01.810 PRE-OPERATIVE CARDIOVASCULAR EXAMINATION: ICD-10-CM

## 2024-12-23 DIAGNOSIS — I73.9 PERIPHERAL VASCULAR DISEASE (CMS/HCC): ICD-10-CM

## 2024-12-23 DIAGNOSIS — I10 ESSENTIAL HYPERTENSION: ICD-10-CM

## 2024-12-23 DIAGNOSIS — E66.01 CLASS 3 SEVERE OBESITY DUE TO EXCESS CALORIES WITH SERIOUS COMORBIDITY AND BODY MASS INDEX (BMI) OF 40.0 TO 44.9 IN ADULT (CMS/HCC): ICD-10-CM

## 2024-12-23 LAB
ATRIAL RATE: 73
P AXIS: 30
PR INTERVAL: 160
QRS DURATION: 84
QT INTERVAL: 400
QTC CALCULATION(BAZETT): 440
R AXIS: -2
T WAVE AXIS: 51
VENTRICULAR RATE: 73

## 2024-12-23 PROCEDURE — 3008F BODY MASS INDEX DOCD: CPT | Performed by: INTERNAL MEDICINE

## 2024-12-23 PROCEDURE — 93000 ELECTROCARDIOGRAM COMPLETE: CPT | Performed by: INTERNAL MEDICINE

## 2024-12-23 PROCEDURE — 99214 OFFICE O/P EST MOD 30 MIN: CPT | Performed by: INTERNAL MEDICINE

## 2024-12-23 PROCEDURE — 3078F DIAST BP <80 MM HG: CPT | Performed by: INTERNAL MEDICINE

## 2024-12-23 PROCEDURE — 3074F SYST BP LT 130 MM HG: CPT | Performed by: INTERNAL MEDICINE

## 2024-12-23 NOTE — PATIENT INSTRUCTIONS
Stop Xarelto after Dec 30th and resume the morning following surgery    You may stop aspirin 5-7 days before surgery and resume the morning following surgery

## 2024-12-23 NOTE — LETTER
"December 23, 2024     Nora Gudino DO  [address] [facility] [address]    Patient: Mirela Lopez  YOB: 1965  Date of Visit: 12/23/2024      Dear Dr. Gudino:    Thank you for referring Mirela Lopez to me for evaluation. Below are my notes for this consultation.    If you have questions, please do not hesitate to call me. I look forward to following your patient along with you.         Sincerely,        Zara Mccloud MD        CC: MD Gladys Naik DO O'Malley Tysko, Erin, MD  12/23/2024 10:56 AM  Sign when Signing Visit       Cardiology Office Visit     Patient ID: Mirela Lopez 59 y.o. female 1965  PCP: Nora Gudino DO   History Present Illness     Mirela Lopez presents to the office today for follow-up regarding coronary artery disease and previous percutaneous intervention and a 90% LAD lesion in March 2021.  She also had prior circumflex intervention for acute coronary syndrome modification in [place] in 2017.  She has a history of peripheral vascular disease and previous \"kissing iliac stents\" but is stable from that perspective.  She denies any claudication.  She is enjoying custodial, but did develop some dysfunctional uterine bleeding and had a biopsy which was negative.  She is planned to have a D&C on January 2 and requires cardiac clearance.    We reviewed the findings of her recent cardiac PET scan which did not demonstrate any significant ischemia.  Despite her known coronary disease and diabetes, she had normal myocardial blood flow at rest and normal flow reserve.  There is no evidence of any ischemia but she does have a small inferoapical scar.  On review of systems, she has no new anginal symptoms.  No exertional chest pain, increasing dyspnea, PND or orthopnea.    Allergies/Medications   Allergies:Adhesive and Sulfa (sulfonamide antibiotics)    Medications:  •  aspirin, Take 81 mg by mouth daily.  •  atorvastatin, Take 1 tablet " "(80 mg total) by mouth once daily.  •  empagliflozin, Take 1 tablet (25 mg total) by mouth daily.  •  ferrous sulfate, Take 1 tablet by mouth daily with breakfast.    •  glipiZIDE, Take 1 tablet (5 mg total) by mouth daily.  •  lisinopriL, Take 1 tablet (10 mg total) by mouth once daily.  •  metFORMIN XR, TAKE TWO TABLETS BY MOUTH DAILY  •  metoprolol succinate XL, Take 1 tablet (100 mg total) by mouth once daily.  •  rivaroxaban, Take 1 tablet (2.5 mg total) by mouth 2 (two) times a day.  •  MOUNJARO, Inject 0.5 mL (2.5 mg total) under the skin every (seven) 7 days.     Physical Exam     Vitals:    12/23/24 0930 12/23/24 0949   BP: (!) 142/70 122/74   BP Location: Left upper arm Left upper arm   Patient Position: Sitting    Pulse: 73    Resp: 16    Weight: 103 kg (227 lb)    Height: 1.626 m (5' 4.02\")      BP Readings from Last 3 Encounters:   12/23/24 122/74   12/16/24 120/78   10/29/24 114/72     Wt Readings from Last 3 Encounters:   12/23/24 103 kg (227 lb)   12/16/24 103 kg (227 lb 12.8 oz)   10/29/24 102 kg (224 lb 9.6 oz)      Physical Exam:  Constitutional: Obese white female. No apparent distress.   Eyes: No icterus  ENT:  Mucosa moist  Neck: Supple, no JVD or hepatojugular reflux.  No bruits.  Cardiac: Normal S1 and S2, regular rate and rhythm, no S3.  No rub.  No ectopy.  There is a 1/6 systolic murmur at the left sternal border.   Lungs: Clear to auscultation bilaterally.  No wheezing.  Abdomen: Soft, nontender, positive normoactive bowel sounds.  No bruit.  Extremities: No clubbing or cyanosis.  No calf tenderness.  No edema.  Distal pulses are intact but diminished bilaterally.   Skin: Warm and dry.  No jaundice.  Musculoskeletal: No joint swelling or erythema.  Neurologic: Awake, alert, oriented.  Moving all extremities.  Psychiatric: No agitation.    Labs/Imaging/Procedures   Labs  Lab Results   Component Value Date    WBC 8.73 12/18/2024    HGB 12.5 12/18/2024     12/18/2024    ALT 37 (H) " 07/12/2023    AST 36 07/12/2023     12/18/2024    K 4.0 12/18/2024     12/18/2024    CREATININE 0.5 (L) 12/18/2024    BUN 10 12/18/2024    CO2 29 12/18/2024    TSH 1.540 07/12/2023    INR 1.0 03/25/2021    HGBA1C 8.3 (H) 07/12/2023     Lab Results   Component Value Date    CHOL 151 07/12/2023    CHOL 115 11/24/2021    HDL 43 07/12/2023    HDL 35 (L) 11/24/2021    TRIG 162 (H) 07/12/2023    TRIG 130 11/24/2021     Lab Results   Component Value Date    LDLCALC 80 07/12/2023    LDLCALC 57 11/24/2021     Imaging  CV nuclear cardiac PET/CT myocardial perfusion scan 10/3/2024: There is a moderate-sized fixed inferoapical and apical defect with no significant reversibility.  Findings consistent with scar.  Resting LVEF 53%, increasing to 61% with stress.  Normal wall motion.  Normal resting myocardial blood flow, normal stress myocardial blood flow and normal flow reserve.  No acute EKG changes or arrhythmia with stress.    Lower extremity JOURDAN 6/26/2024: Right JOURDAN 1.2, left JOURDAN 0.97.    TTE 7/5/2023:  Technically difficult study.  Normal LV size with mild concentric LVH.  LVEF approximately 65%.  Endocardium is difficult to visualize and wall motion cannot be adequately assessed.  No obvious wall motion abnormalities are noted.  Abnormal septal motion consistent with conduction abnormality.  Normal RV size and function.  Trace TR.  Jet insufficient to calculate RVSP.  Normal left atrial size.  Diastolic filling pattern acceptable for age.  The aortic valve is difficult to visualize but appears trileaflet and opens well.  No aortic stenosis or regurgitation.  Aortic root and ascending aorta are sclerotic.  Sclerotic mitral valve leaflets with mild MAC.  Trace mitral regurgitation.  IVC is not well-visualized.  Trivial pericardial effusion.     PVR 6/28/2023: The right resting JOURDAN is normal.  The left resting JOURDAN is normal.     PVR 6/22/22: Resting JOURDAN within normal range-1.13 on the right and 0.9 on the left.   PVR is normal on the left and nondiagnostic on the right.     PVR lower extremity 6/23/2021: Normal ABIs bilaterally at rest.  History of kissing iliac stents.       Cardiac catheterization 3/30/2021: Discrete 90% stenosis in the mid LAD followed by tubular 70% stenosis.  Prior left circumflex stents patent.  Patient had successful NERI of LAD lesions.       Echo 3/10/2021:  Technically difficult study secondary to body habitus. Overall normal LV size with mild concentric LVH.  LVEF 65-70%.  No obvious regional wall motion abnormalities but endocardium difficult to visualize.  Impaired relaxation. The RV  is difficult to visualize in subcostal views are of poor quality.  The RV is grossly normal in size and function in the apical views. · Trileaflet aortic valve.  No aortic stenosis.  Sclerotic mitral valve leaflets with trace MR.  No significant pericardial effusion.     Exercise MPI 3/10/2021:  Abnormal exercise nuclear stress test.  Reversible defects are noted in the lateral wall, apex, and anterior wall suggestive of ischemia.  No prior study for comparison.  LVEF 67%.  No transient ischemic dilatation.  EKG portion is negative for arrhythmias or ischemic changes.  Patient exercised on Abner protocol for 3 minutes 4.6 METS.  Poor exercise tolerance.  No chest pain reported.  Peak blood pressure 144/88 mmHg.  Patient achieved 92% of predicted maximal heart rate.     LE arterial duplex bilateral 7/22/2020: Triphasic waveforms throughout the lower extremity with at least two-vessel runoff to the foot.  The left lower extremity has triphasic waveforms with at least two-vessel runoff.  There is a probable stenosis at the origin of the profunda.  No significant change.       LE arterial duplex bilateral 7/17/2019: Triphasic and biphasic waveforms throughout the right and left lower extremities.  No evidence of discrete stenosis or obstruction.       Exercise stress echocardiogram 4/19/2019: Very technically difficult  study.  Exercise EKG negative for ischemia or arrhythmia. Poor exercise capacity completing only stage I Abner protocol.  Exercise EKG negative for ischemia or arrhythmia at that workload.  At rest, normal LV size and function with LVEF 60-65%.  Basal mid inferior hypokinesis.  Mildly dilated RV with preserved function.  Trace TR.  Trace MR.  Trileaflet aortic valve.  No significant stenosis or insufficiency.  Prominent pericardial fat pad.  Immediately following exercise overall LV function improves.  Persistent basal inferior hypokinesis consistent with scar. No ischemia.     PVR 1/9/2019: Right JOURDAN 1.12 at rest and 1.1 following exercise.  Left JOURDAN 0.95 at rest and 0.96 following exercise.  Normal segmental pressures and PVRs.       Periph Intervention 10/2017: Successful right iliac stent.     Abd Ao Duplex 9/2017: Elevated velocities right MICHAEL with monophasic flow.     Echo 9/2017: LVEF 65%, Top normal LV size with overall preserved function. No significant pulmonary hypertension by Doppler. Technically difficult study.     Cath 8/13/17: LAD tortuous, no significant disease. RCA dominant and patent. Left circumflex with 90% proximal lesion, 95% mid lesion and distal thrombus. Successful NERI ×2 left circumflex. Occluded right MICHAEL at origin with collaterals. Patent left MICHAEL and CFA    EKG:     Assessment/Plan     1. CAD in native artery    2. Type 2 diabetes mellitus without complication, without long-term current use of insulin (CMS/[facility])    3. Class 3 severe obesity due to excess calories with serious comorbidity and body mass index (BMI) of 40.0 to 44.9 in adult (CMS/HCC)    4. Abnormal EKG    5. Peripheral vascular disease (CMS/HCC)    6. Claudication (CMS/[facility])    7. Essential hypertension    8. Pre-operative cardiovascular examination       Mirela is doing well.  She has no evidence of volume overload, but does have aggressive coronary disease for a woman in her 50s.  We reviewed the results of her PET scan  and I was very pleased to see that she had no evidence of ischemia and had normal flow reserve.  LV function remains preserved.  She has no new anginal symptoms.  Her peripheral vascular disease has been stable with acceptable JOURDAN and no new claudication.    Her EKG has no acute change.  Blood pressure remains well-controlled on metoprolol 100 mg once daily and lisinopril 10 mg once daily.    She is on Jardiance for her diabetes and Mounjaro to help promote weight loss.  Her most recent lab work on the 18th showed normal creatinine.  Her CBC is unremarkable.  She has not had a recent lipid profile or A1c that is available today, but hopefully this can be performed soon.  She is waiting to transition to a new primary care physician.    Mirela may proceed with D&C as scheduled on January 2.  She may hold aspirin for 5 to 7 days prior to the procedure.  I told her to take her last dose of Xarelto on December 30 and she will hold the medication on December 31 and January 1 in anticipation of surgery on January 2.  She should resume this within 24 hours following surgery if no surgical contraindication.  She was given written and verbal instructions regarding this.  Mirela is already aware about holding her diabetic medications.  She should hold lisinopril.  She may take her metoprolol without interruption.    Return in about 6 months (around 6/23/2025) for follow-up, earlier if any change in symptoms.  Available medical records reviewed and updated including laboratory data, imaging studies, previous outpatient and inpatient records.  Counseling/education performed. Care everywhere utilized where appropriate.    Thank you for allowing me to participate in the care of this patient.  I hope this information is helpful.      Zara Mccloud MD, [facility], [facility]  12/23/2024  10:50 AM  This document was generated utilizing voice recognition technology. Please excuse any typographical errors which may be present.      By signing  my name below, I, Gladys Ham, attest that this documentation has been prepared under the direction and in the presence of MD Gladys Galan  12/23/2024 10:50 AM

## 2024-12-23 NOTE — PROGRESS NOTES
"     Cardiology Office Visit     Patient ID: Mirela Lopez 59 y.o. female 1965  PCP: Nora Gudino,    History Present Illness     Mirela Lopez presents to the office today for follow-up regarding coronary artery disease and previous percutaneous intervention and a 90% LAD lesion in March 2021.  She also had prior circumflex intervention for acute coronary syndrome modification in [place] in 2017.  She has a history of peripheral vascular disease and previous \"kissing iliac stents\" but is stable from that perspective.  She denies any claudication.  She is enjoying FDC, but did develop some dysfunctional uterine bleeding and had a biopsy which was negative.  She is planned to have a D&C on January 2 and requires cardiac clearance.    We reviewed the findings of her recent cardiac PET scan which did not demonstrate any significant ischemia.  Despite her known coronary disease and diabetes, she had normal myocardial blood flow at rest and normal flow reserve.  There is no evidence of any ischemia but she does have a small inferoapical scar.  On review of systems, she has no new anginal symptoms.  No exertional chest pain, increasing dyspnea, PND or orthopnea.    Allergies/Medications   Allergies:Adhesive and Sulfa (sulfonamide antibiotics)    Medications:    aspirin, Take 81 mg by mouth daily.    atorvastatin, Take 1 tablet (80 mg total) by mouth once daily.    empagliflozin, Take 1 tablet (25 mg total) by mouth daily.    ferrous sulfate, Take 1 tablet by mouth daily with breakfast.      glipiZIDE, Take 1 tablet (5 mg total) by mouth daily.    lisinopriL, Take 1 tablet (10 mg total) by mouth once daily.    metFORMIN XR, TAKE TWO TABLETS BY MOUTH DAILY    metoprolol succinate XL, Take 1 tablet (100 mg total) by mouth once daily.    rivaroxaban, Take 1 tablet (2.5 mg total) by mouth 2 (two) times a day.    MOUNJARO, Inject 0.5 mL (2.5 mg total) under the skin every (seven) 7 days.     Physical Exam " "    Vitals:    12/23/24 0930 12/23/24 0949   BP: (!) 142/70 122/74   BP Location: Left upper arm Left upper arm   Patient Position: Sitting    Pulse: 73    Resp: 16    Weight: 103 kg (227 lb)    Height: 1.626 m (5' 4.02\")      BP Readings from Last 3 Encounters:   12/23/24 122/74   12/16/24 120/78   10/29/24 114/72     Wt Readings from Last 3 Encounters:   12/23/24 103 kg (227 lb)   12/16/24 103 kg (227 lb 12.8 oz)   10/29/24 102 kg (224 lb 9.6 oz)      Physical Exam:  Constitutional: Obese white female. No apparent distress.   Eyes: No icterus  ENT:  Mucosa moist  Neck: Supple, no JVD or hepatojugular reflux.  No bruits.  Cardiac: Normal S1 and S2, regular rate and rhythm, no S3.  No rub.  No ectopy.  There is a 1/6 systolic murmur at the left sternal border.   Lungs: Clear to auscultation bilaterally.  No wheezing.  Abdomen: Soft, nontender, positive normoactive bowel sounds.  No bruit.  Extremities: No clubbing or cyanosis.  No calf tenderness.  No edema.  Distal pulses are intact but diminished bilaterally.   Skin: Warm and dry.  No jaundice.  Musculoskeletal: No joint swelling or erythema.  Neurologic: Awake, alert, oriented.  Moving all extremities.  Psychiatric: No agitation.    Labs/Imaging/Procedures   Labs  Lab Results   Component Value Date    WBC 8.73 12/18/2024    HGB 12.5 12/18/2024     12/18/2024    ALT 37 (H) 07/12/2023    AST 36 07/12/2023     12/18/2024    K 4.0 12/18/2024     12/18/2024    CREATININE 0.5 (L) 12/18/2024    BUN 10 12/18/2024    CO2 29 12/18/2024    TSH 1.540 07/12/2023    INR 1.0 03/25/2021    HGBA1C 8.3 (H) 07/12/2023     Lab Results   Component Value Date    CHOL 151 07/12/2023    CHOL 115 11/24/2021    HDL 43 07/12/2023    HDL 35 (L) 11/24/2021    TRIG 162 (H) 07/12/2023    TRIG 130 11/24/2021     Lab Results   Component Value Date    LDLCALC 80 07/12/2023    LDLCALC 57 11/24/2021     Imaging  CV nuclear cardiac PET/CT myocardial perfusion scan 10/3/2024: There " is a moderate-sized fixed inferoapical and apical defect with no significant reversibility.  Findings consistent with scar.  Resting LVEF 53%, increasing to 61% with stress.  Normal wall motion.  Normal resting myocardial blood flow, normal stress myocardial blood flow and normal flow reserve.  No acute EKG changes or arrhythmia with stress.    Lower extremity JOURDAN 6/26/2024: Right JOURDAN 1.2, left JOURDAN 0.97.    TTE 7/5/2023:  Technically difficult study.  Normal LV size with mild concentric LVH.  LVEF approximately 65%.  Endocardium is difficult to visualize and wall motion cannot be adequately assessed.  No obvious wall motion abnormalities are noted.  Abnormal septal motion consistent with conduction abnormality.  Normal RV size and function.  Trace TR.  Jet insufficient to calculate RVSP.  Normal left atrial size.  Diastolic filling pattern acceptable for age.  The aortic valve is difficult to visualize but appears trileaflet and opens well.  No aortic stenosis or regurgitation.  Aortic root and ascending aorta are sclerotic.  Sclerotic mitral valve leaflets with mild MAC.  Trace mitral regurgitation.  IVC is not well-visualized.  Trivial pericardial effusion.     PVR 6/28/2023: The right resting JOURDAN is normal.  The left resting JOURDAN is normal.     PVR 6/22/22: Resting JOURDAN within normal range-1.13 on the right and 0.9 on the left.  PVR is normal on the left and nondiagnostic on the right.     PVR lower extremity 6/23/2021: Normal ABIs bilaterally at rest.  History of kissing iliac stents.       Cardiac catheterization 3/30/2021: Discrete 90% stenosis in the mid LAD followed by tubular 70% stenosis.  Prior left circumflex stents patent.  Patient had successful NERI of LAD lesions.       Echo 3/10/2021:  Technically difficult study secondary to body habitus. Overall normal LV size with mild concentric LVH.  LVEF 65-70%.  No obvious regional wall motion abnormalities but endocardium difficult to visualize.  Impaired  relaxation. The RV  is difficult to visualize in subcostal views are of poor quality.  The RV is grossly normal in size and function in the apical views. · Trileaflet aortic valve.  No aortic stenosis.  Sclerotic mitral valve leaflets with trace MR.  No significant pericardial effusion.     Exercise MPI 3/10/2021:  Abnormal exercise nuclear stress test.  Reversible defects are noted in the lateral wall, apex, and anterior wall suggestive of ischemia.  No prior study for comparison.  LVEF 67%.  No transient ischemic dilatation.  EKG portion is negative for arrhythmias or ischemic changes.  Patient exercised on Abner protocol for 3 minutes 4.6 METS.  Poor exercise tolerance.  No chest pain reported.  Peak blood pressure 144/88 mmHg.  Patient achieved 92% of predicted maximal heart rate.     LE arterial duplex bilateral 7/22/2020: Triphasic waveforms throughout the lower extremity with at least two-vessel runoff to the foot.  The left lower extremity has triphasic waveforms with at least two-vessel runoff.  There is a probable stenosis at the origin of the profunda.  No significant change.       LE arterial duplex bilateral 7/17/2019: Triphasic and biphasic waveforms throughout the right and left lower extremities.  No evidence of discrete stenosis or obstruction.       Exercise stress echocardiogram 4/19/2019: Very technically difficult study.  Exercise EKG negative for ischemia or arrhythmia. Poor exercise capacity completing only stage I Abner protocol.  Exercise EKG negative for ischemia or arrhythmia at that workload.  At rest, normal LV size and function with LVEF 60-65%.  Basal mid inferior hypokinesis.  Mildly dilated RV with preserved function.  Trace TR.  Trace MR.  Trileaflet aortic valve.  No significant stenosis or insufficiency.  Prominent pericardial fat pad.  Immediately following exercise overall LV function improves.  Persistent basal inferior hypokinesis consistent with scar. No ischemia.     PVR  1/9/2019: Right JOURDAN 1.12 at rest and 1.1 following exercise.  Left JOURDAN 0.95 at rest and 0.96 following exercise.  Normal segmental pressures and PVRs.       Periph Intervention 10/2017: Successful right iliac stent.     Abd Ao Duplex 9/2017: Elevated velocities right MICHAEL with monophasic flow.     Echo 9/2017: LVEF 65%, Top normal LV size with overall preserved function. No significant pulmonary hypertension by Doppler. Technically difficult study.     Cath 8/13/17: LAD tortuous, no significant disease. RCA dominant and patent. Left circumflex with 90% proximal lesion, 95% mid lesion and distal thrombus. Successful NERI ×2 left circumflex. Occluded right MICHAEL at origin with collaterals. Patent left MICHAEL and CFA    EKG:     Assessment/Plan     1. CAD in native artery    2. Type 2 diabetes mellitus without complication, without long-term current use of insulin (CMS/[facility])    3. Class 3 severe obesity due to excess calories with serious comorbidity and body mass index (BMI) of 40.0 to 44.9 in adult (CMS/[facility])    4. Abnormal EKG    5. Peripheral vascular disease (CMS/[facility])    6. Claudication (CMS/[facility])    7. Essential hypertension    8. Pre-operative cardiovascular examination       Mirela is doing well.  She has no evidence of volume overload, but does have aggressive coronary disease for a woman in her 50s.  We reviewed the results of her PET scan and I was very pleased to see that she had no evidence of ischemia and had normal flow reserve.  LV function remains preserved.  She has no new anginal symptoms.  Her peripheral vascular disease has been stable with acceptable JOURDAN and no new claudication.    Her EKG has no acute change.  Blood pressure remains well-controlled on metoprolol 100 mg once daily and lisinopril 10 mg once daily.    She is on Jardiance for her diabetes and Mounjaro to help promote weight loss.  Her most recent lab work on the 18th showed normal creatinine.  Her CBC is unremarkable.  She has not had a recent  lipid profile or A1c that is available today, but hopefully this can be performed soon.  She is waiting to transition to a new primary care physician.    Mirela may proceed with D&C as scheduled on January 2.  She may hold aspirin for 5 to 7 days prior to the procedure.  I told her to take her last dose of Xarelto on December 30 and she will hold the medication on December 31 and January 1 in anticipation of surgery on January 2.  She should resume this within 24 hours following surgery if no surgical contraindication.  She was given written and verbal instructions regarding this.  Mirela is already aware about holding her diabetic medications.  She should hold lisinopril.  She may take her metoprolol without interruption.    Return in about 6 months (around 6/23/2025) for follow-up, earlier if any change in symptoms.  Available medical records reviewed and updated including laboratory data, imaging studies, previous outpatient and inpatient records.  Counseling/education performed. Care everywhere utilized where appropriate.    Thank you for allowing me to participate in the care of this patient.  I hope this information is helpful.      Zara Mccloud MD, Merged with [facility], [facility], [facility]  12/23/2024  10:50 AM  This document was generated utilizing voice recognition technology. Please excuse any typographical errors which may be present.      By signing my name below, I, Gladys Ham, attest that this documentation has been prepared under the direction and in the presence of MD Gladys Galan  12/23/2024 10:50 AM

## 2024-12-27 ENCOUNTER — PRE-ADMISSION TESTING (OUTPATIENT)
Dept: PREADMISSION TESTING | Age: 59
End: 2024-12-27
Payer: COMMERCIAL

## 2024-12-27 VITALS — BODY MASS INDEX: 39.09 KG/M2 | WEIGHT: 229 LBS | HEIGHT: 64 IN

## 2024-12-27 NOTE — PRE-PROCEDURE INSTRUCTIONS
[address] [address]    1. We will call you between 3 pm and 7 pm on 12/31 to determine that arrival time for your procedure. If you do not hear by 6PM. Please call [phone] for arrival time.     2. Please report to Main Entrance near Park[address], walk into main lobby and report to the admission desk on the first floor on the day of your procedure.    3. Please follow these fasting guidelines:     No solid food EIGHT HOURS prior to arrival time on day of surgery.  6 ounces of clear liquids, meaning water or PLAIN black coffee WITHOUT any milk, cream, sugar, or sweetener are permitted up to TWO HOURS prior to arrival at the hospital.    4. Please take ONLY the following medications with a sip of water on the morning of your procedure:    Take metoprolol.    5. Other Instructions:  Stop aspirin and xarelto as directed by your cardiologist.  (Last dose aspirin  was 12/25, last dose xarelto to be taken 12/29.)  Do not take mounjaro within  & days of surgery - Last dose 12/22.   Stop NSAID  medications( non steroidal antiinflammatories - advil, motrin , aspirin, ibuprofen), as well as vitamins and supplements  -  one week prior to surgery unless directed differently by surgeon.  You may brush your teeth the morning of the procedure. Rinse and spit, do not swallow.  Bring a list of your medications with dosages.    Use surgical wash as directed - use dial gold soap.     6. If you develop a cold, cough, fever, rash, or other symptom prior to the day of the procedure, please report it to your physician immediately.    7. If you need to cancel the procedure for any reason, please contact your physician.    8. Make arrangements to have safe transportation home accompanied by a responsible adult. If you have not arranged safe transportation home, your surgery will be cancelled. Safe transportation may include private vehicle, ride-share service, taxi and public  transportation when accompanied by a responsible adult who will assist you home. A responsible adult is someone known to you and does not include the taxi, ride-share or public transit drive transporting you.    9.  If it is medically necessary for you to have a longer stay, you will be informed as soon as the decision is made.    10. Only bring essential items to the hospital.  Do not wear or bring anything of value to the hospital including jewelry of any kind, money, or wallet. Do not wear make-up or contact lenses.  DO NOT BRING MEDICATIONS FROM HOME unless instructed to do so. DO bring your hearing aids, glasses, and a case    11. No lotion, creams, powders, or oils on skin the morning of procedure     12. Dress in comfortable clothes.    13.  If instructed, please bring a copy of your Advanced Directive (Living Will/Durable Power of ) on the day of your procedure.     14. Ensuring your safety at all times is a very important part of our [facility] Culture of Safety. After having surgery and sedation, you are at risk for falling and balance issues. Although you may feel awake, the effects of the medication can last up to 24 hours after anesthesia. If you need to use the bathroom during your recovery period, nursing staff will escort you there and stay with you to ensure your safety.    15. Refrain from drinking alcohol and smoking cigarettes for 24 hours prior to surgery.    16. Shower with antibacterial soap (Dial) the night before and morning of your procedure.  If your procedure indicates the need for CHG antiseptic wash (Bactoshield or Hibiclens), please use this instead and follow instructions as discussed at the time of your Pre-Admission Testing phone interview or visit.    Above instructions reviewed with patient and patient acknowledges understanding.    Form explained by: Brittany Steen RN

## 2025-01-01 ENCOUNTER — ANESTHESIA EVENT (OUTPATIENT)
Dept: SURGERY | Facility: HOSPITAL | Age: 60
Setting detail: HOSPITAL OUTPATIENT SURGERY
End: 2025-01-01
Payer: COMMERCIAL

## 2025-01-02 ENCOUNTER — ANESTHESIA (OUTPATIENT)
Dept: SURGERY | Facility: HOSPITAL | Age: 60
Setting detail: HOSPITAL OUTPATIENT SURGERY
End: 2025-01-02
Payer: COMMERCIAL

## 2025-01-02 ENCOUNTER — PREP FOR CASE (OUTPATIENT)
Dept: OBSTETRICS AND GYNECOLOGY | Facility: CLINIC | Age: 60
End: 2025-01-02
Payer: COMMERCIAL

## 2025-01-02 ENCOUNTER — HOSPITAL ENCOUNTER (OUTPATIENT)
Facility: HOSPITAL | Age: 60
Setting detail: HOSPITAL OUTPATIENT SURGERY
Discharge: HOME | End: 2025-01-02
Attending: OBSTETRICS & GYNECOLOGY | Admitting: OBSTETRICS & GYNECOLOGY
Payer: COMMERCIAL

## 2025-01-02 VITALS
DIASTOLIC BLOOD PRESSURE: 65 MMHG | BODY MASS INDEX: 39.44 KG/M2 | OXYGEN SATURATION: 98 % | SYSTOLIC BLOOD PRESSURE: 131 MMHG | TEMPERATURE: 97 F | HEART RATE: 72 BPM | RESPIRATION RATE: 16 BRPM | HEIGHT: 64 IN | WEIGHT: 231 LBS

## 2025-01-02 DIAGNOSIS — R93.89 THICKENED ENDOMETRIUM: ICD-10-CM

## 2025-01-02 DIAGNOSIS — N84.0 ENDOMETRIAL POLYP: ICD-10-CM

## 2025-01-02 LAB
ABO + RH BLD: NORMAL
D AG BLD QL: NEGATIVE
GLUCOSE BLD-MCNC: 146 MG/DL (ref 70–99)
GLUCOSE BLD-MCNC: 166 MG/DL (ref 70–99)
POCT TEST: ABNORMAL
POCT TEST: ABNORMAL

## 2025-01-02 PROCEDURE — 25800000 HC PHARMACY IV SOLUTIONS

## 2025-01-02 PROCEDURE — 71000002 HC PACU PHASE 2 INITIAL 30MIN: Performed by: OBSTETRICS & GYNECOLOGY

## 2025-01-02 PROCEDURE — 88305 TISSUE EXAM BY PATHOLOGIST: CPT | Performed by: OBSTETRICS & GYNECOLOGY

## 2025-01-02 PROCEDURE — 71000001 HC PACU PHASE 1 INITIAL 30MIN: Performed by: OBSTETRICS & GYNECOLOGY

## 2025-01-02 PROCEDURE — 36000012 HC OR LEVEL 2 EA ADDL MIN: Performed by: OBSTETRICS & GYNECOLOGY

## 2025-01-02 PROCEDURE — 58558 HYSTEROSCOPY BIOPSY: CPT | Performed by: OBSTETRICS & GYNECOLOGY

## 2025-01-02 PROCEDURE — 0UB98ZX EXCISION OF UTERUS, VIA NATURAL OR ARTIFICIAL OPENING ENDOSCOPIC, DIAGNOSTIC: ICD-10-PCS | Performed by: OBSTETRICS & GYNECOLOGY

## 2025-01-02 PROCEDURE — 63600000 HC DRUGS/DETAIL CODE: Mod: JZ

## 2025-01-02 PROCEDURE — 36415 COLL VENOUS BLD VENIPUNCTURE: CPT | Performed by: OBSTETRICS & GYNECOLOGY

## 2025-01-02 PROCEDURE — 37000002 HC ANESTHESIA MAC: Performed by: OBSTETRICS & GYNECOLOGY

## 2025-01-02 PROCEDURE — 25000000 HC PHARMACY GENERAL

## 2025-01-02 PROCEDURE — 0UDB8ZX EXTRACTION OF ENDOMETRIUM, VIA NATURAL OR ARTIFICIAL OPENING ENDOSCOPIC, DIAGNOSTIC: ICD-10-PCS | Performed by: OBSTETRICS & GYNECOLOGY

## 2025-01-02 PROCEDURE — 63600000 HC DRUGS/DETAIL CODE: Mod: JW

## 2025-01-02 PROCEDURE — C1782 MORCELLATOR: HCPCS | Performed by: OBSTETRICS & GYNECOLOGY

## 2025-01-02 PROCEDURE — 71000011 HC PACU PHASE 1 EA ADDL MIN: Performed by: OBSTETRICS & GYNECOLOGY

## 2025-01-02 PROCEDURE — 36000002 HC OR LEVEL 2 INITIAL 30MIN: Performed by: OBSTETRICS & GYNECOLOGY

## 2025-01-02 RX ORDER — OXYCODONE HYDROCHLORIDE 5 MG/1
5 TABLET ORAL EVERY 4 HOURS PRN
Status: DISCONTINUED | OUTPATIENT
Start: 2025-01-02 | End: 2025-01-02 | Stop reason: HOSPADM

## 2025-01-02 RX ORDER — IBUPROFEN 400 MG/1
400 TABLET ORAL EVERY 4 HOURS PRN
Status: DISCONTINUED | OUTPATIENT
Start: 2025-01-02 | End: 2025-01-02 | Stop reason: HOSPADM

## 2025-01-02 RX ORDER — LIDOCAINE HYDROCHLORIDE 10 MG/ML
INJECTION, SOLUTION EPIDURAL; INFILTRATION; INTRACAUDAL; PERINEURAL AS NEEDED
Status: DISCONTINUED | OUTPATIENT
Start: 2025-01-02 | End: 2025-01-02 | Stop reason: SURG

## 2025-01-02 RX ORDER — PROPOFOL 10 MG/ML
INJECTION, EMULSION INTRAVENOUS CONTINUOUS PRN
Status: DISCONTINUED | OUTPATIENT
Start: 2025-01-02 | End: 2025-01-02 | Stop reason: SURG

## 2025-01-02 RX ORDER — DEXTROSE 50 % IN WATER (D50W) INTRAVENOUS SYRINGE
25 AS NEEDED
Status: DISCONTINUED | OUTPATIENT
Start: 2025-01-02 | End: 2025-01-02 | Stop reason: HOSPADM

## 2025-01-02 RX ORDER — METOCLOPRAMIDE HYDROCHLORIDE 5 MG/ML
10 INJECTION INTRAMUSCULAR; INTRAVENOUS
Status: DISCONTINUED | OUTPATIENT
Start: 2025-01-02 | End: 2025-01-02 | Stop reason: HOSPADM

## 2025-01-02 RX ORDER — KETAMINE HYDROCHLORIDE 50 MG/ML
INJECTION, SOLUTION INTRAMUSCULAR; INTRAVENOUS AS NEEDED
Status: DISCONTINUED | OUTPATIENT
Start: 2025-01-02 | End: 2025-01-02 | Stop reason: SURG

## 2025-01-02 RX ORDER — MIDAZOLAM HYDROCHLORIDE 2 MG/2ML
INJECTION, SOLUTION INTRAMUSCULAR; INTRAVENOUS AS NEEDED
Status: DISCONTINUED | OUTPATIENT
Start: 2025-01-02 | End: 2025-01-02 | Stop reason: SURG

## 2025-01-02 RX ORDER — DEXTROSE 40 %
15-30 GEL (GRAM) ORAL AS NEEDED
Status: DISCONTINUED | OUTPATIENT
Start: 2025-01-02 | End: 2025-01-02 | Stop reason: HOSPADM

## 2025-01-02 RX ORDER — FENTANYL CITRATE 50 UG/ML
25 INJECTION, SOLUTION INTRAMUSCULAR; INTRAVENOUS EVERY 5 MIN PRN
Status: DISCONTINUED | OUTPATIENT
Start: 2025-01-02 | End: 2025-01-02 | Stop reason: HOSPADM

## 2025-01-02 RX ORDER — HYDROMORPHONE HYDROCHLORIDE 1 MG/ML
0.5 INJECTION, SOLUTION INTRAMUSCULAR; INTRAVENOUS; SUBCUTANEOUS
Status: DISCONTINUED | OUTPATIENT
Start: 2025-01-02 | End: 2025-01-02 | Stop reason: HOSPADM

## 2025-01-02 RX ORDER — ONDANSETRON HYDROCHLORIDE 2 MG/ML
INJECTION, SOLUTION INTRAVENOUS AS NEEDED
Status: DISCONTINUED | OUTPATIENT
Start: 2025-01-02 | End: 2025-01-02 | Stop reason: SURG

## 2025-01-02 RX ORDER — IBUPROFEN 200 MG
16-32 TABLET ORAL AS NEEDED
Status: DISCONTINUED | OUTPATIENT
Start: 2025-01-02 | End: 2025-01-02 | Stop reason: HOSPADM

## 2025-01-02 RX ORDER — PROPOFOL 200MG/20ML
SYRINGE (ML) INTRAVENOUS AS NEEDED
Status: DISCONTINUED | OUTPATIENT
Start: 2025-01-02 | End: 2025-01-02 | Stop reason: SURG

## 2025-01-02 RX ORDER — ONDANSETRON HYDROCHLORIDE 2 MG/ML
4 INJECTION, SOLUTION INTRAVENOUS
Status: DISCONTINUED | OUTPATIENT
Start: 2025-01-02 | End: 2025-01-02 | Stop reason: HOSPADM

## 2025-01-02 RX ORDER — OXYCODONE HYDROCHLORIDE 5 MG/1
5 TABLET ORAL ONCE AS NEEDED
Status: DISCONTINUED | OUTPATIENT
Start: 2025-01-02 | End: 2025-01-02 | Stop reason: HOSPADM

## 2025-01-02 RX ORDER — ONDANSETRON 4 MG/1
4 TABLET, ORALLY DISINTEGRATING ORAL EVERY 8 HOURS PRN
Status: DISCONTINUED | OUTPATIENT
Start: 2025-01-02 | End: 2025-01-02 | Stop reason: HOSPADM

## 2025-01-02 RX ORDER — ACETAMINOPHEN 325 MG/1
650 TABLET ORAL EVERY 4 HOURS PRN
Status: DISCONTINUED | OUTPATIENT
Start: 2025-01-02 | End: 2025-01-02 | Stop reason: HOSPADM

## 2025-01-02 RX ORDER — FENTANYL CITRATE 50 UG/ML
INJECTION, SOLUTION INTRAMUSCULAR; INTRAVENOUS AS NEEDED
Status: DISCONTINUED | OUTPATIENT
Start: 2025-01-02 | End: 2025-01-02 | Stop reason: SURG

## 2025-01-02 RX ORDER — SODIUM CHLORIDE 9 MG/ML
INJECTION, SOLUTION INTRAVENOUS CONTINUOUS PRN
Status: DISCONTINUED | OUTPATIENT
Start: 2025-01-02 | End: 2025-01-02 | Stop reason: SURG

## 2025-01-02 RX ADMIN — KETAMINE HYDROCHLORIDE 10 MG: 50 INJECTION INTRAMUSCULAR; INTRAVENOUS at 12:28

## 2025-01-02 RX ADMIN — FENTANYL CITRATE 25 MCG: 50 INJECTION, SOLUTION INTRAMUSCULAR; INTRAVENOUS at 12:13

## 2025-01-02 RX ADMIN — ONDANSETRON HYDROCHLORIDE 4 MG: 2 SOLUTION INTRAMUSCULAR; INTRAVENOUS at 12:21

## 2025-01-02 RX ADMIN — LIDOCAINE HYDROCHLORIDE 10 ML: 10 INJECTION, SOLUTION EPIDURAL; INFILTRATION; INTRACAUDAL; PERINEURAL at 12:07

## 2025-01-02 RX ADMIN — PROPOFOL 125 MCG/KG/MIN: 10 INJECTION, EMULSION INTRAVENOUS at 12:07

## 2025-01-02 RX ADMIN — PROPOFOL 40 MG: 10 INJECTION, EMULSION INTRAVENOUS at 12:10

## 2025-01-02 RX ADMIN — MIDAZOLAM HYDROCHLORIDE 2 MG: 1 INJECTION, SOLUTION INTRAMUSCULAR; INTRAVENOUS at 12:03

## 2025-01-02 RX ADMIN — SODIUM CHLORIDE: 9 INJECTION, SOLUTION INTRAVENOUS at 12:03

## 2025-01-02 RX ADMIN — KETAMINE HYDROCHLORIDE 20 MG: 50 INJECTION INTRAMUSCULAR; INTRAVENOUS at 12:12

## 2025-01-02 RX ADMIN — FENTANYL CITRATE 25 MCG: 50 INJECTION, SOLUTION INTRAMUSCULAR; INTRAVENOUS at 12:07

## 2025-01-02 RX ADMIN — PROPOFOL 30 MG: 10 INJECTION, EMULSION INTRAVENOUS at 12:07

## 2025-01-02 NOTE — ANESTHESIOLOGIST PRE-PROCEDURE ATTESTATION
Pre-Procedure Patient Identification:  I am the Primary Anesthesiologist and have identified the patient on 01/02/25 at 10:55 AM.   I have confirmed the procedure(s) will be performed by the following surgeon/proceduralist Gladys Kunz DO.

## 2025-01-02 NOTE — DISCHARGE INSTRUCTIONS
Dilation and Curettage, Truclear polypectomy (D&C/Hysteroscopy)  Discharge Instructions    What to Expect    It is normal to have light bleeding.  This can occur immediately after the procedure or in a few days.      It is normal to experience some mild cramping after the procedure but this usually does not last for more than a few days.  You may take Acetaminophen (Tylenol) or Ibuprofen (Motrin, Advil) as directed for pain.    Activity      Please do not get in a swimming pool, hot tub or tub bath for at least 2-3 days after your procedure.      You may resume normal activities in 1-2 days. Please delay sexual intercourse for at least 5-7 days.    Follow-up    Your provider will reach out to you with the results and arrange any necessary follow-up with you    Medications    Please restart your Xarelto and Aspirin tomorrow morning. Continue to take the remainder of your medications as prescribed    PLEASE CALL THE OFFICE [phone] IF YOU EXPERIENCE ANY OF THE FOLLOWING:    - Heavy bleeding (using one pad an hour)  - Fever greater than 101.0   - Foul smelling vaginal discharge   - Worsening pain or any other concerns

## 2025-01-02 NOTE — OR SURGEON
Pre-Procedure patient identification:  I am the primary operating surgeon/proceduralist and I have reviewed the applicable pathology reports and radiology studies for this procedure. I have identified the patient on 01/02/25 at 11:25 AM Gladys Kunz DO  Phone Number: [phone]

## 2025-01-02 NOTE — OP NOTE
D&C Hysteroscopy and polypectomy/truclear Procedure Note     Procedure:    D&C Hysteroscopy and polypectomy/truclear  CPT(R) Code:  12697 - HI HYSTEROSCOPY,W/ENDO BX      Pre-Op Diagnosis: Postmenopausal bleeding with thickened endometrium, fibroids    Post-Op Diagnosis: Same, large endometrial polyp    Surgeon: Dr. Gladys Kunz    Assist: Dr. Jaylene Renee PGY-1    Anesthesia: MAC    Estimated Blood Loss: Minimal    Fluid Deficit: 330           Specimens:   ID Type Source Tests Collected by Time Destination   2 : EMC and uterine polyp Tissue Uterus PATHOLOGY TISSUE EXAM Gladys Kunz L, DO 2025 1232               Complications: None    Condition: Stable    Findings: Normal appearing external female genitalia. Normal multiparous appearing cervix with no lesions or bleeding. On hysteroscopic evaluation, atrophic appearing endometrium with a large endometrial with a thin stalk on the inferior posterior uterine wall. Two fibroids noted on on the right lateral side wall of the uterus and one on the fundus.    Procedure Details:     Mirela Lopez is a 59 y.o.  with a history of post menopausal with thickened endometrium. Patient was explained risks and benefits of procedure and consented for a D&C hysteroscopy. Consents were reconfirmed today.     On 25 patient presented to the hospital and denied any significant interval history. The surgeon and patient were mutually identified and the patient was taken to the operating room.  She was given MAC anesthesia. The patient was placed in the dorsal lithotomy position and prepped and draped in the usual sterile fashion.  A timeout was performed.  Next, a bimanual examination under anesthesia was performed and the cervix was found not to be dilated.  The uterus was found to be mid position and mobile with non palpable adnexa.  Normal appearing external genitalia were appreciated. The patient was then straight cathed for 200cc of clear urine.  A Bradley speculum  and a Woolford retractor were placed in the vagina, the cervix was easily visualized and the anterior lip was grasped with a single toothed tenaculum. The cervix serially dilated to accommodate a 7mm hysteroscope. Upon insertion of the scope, the above findings were noted with a large endometrial polyp taking up most of the endometrial cavity. A Truclear soft tissue plus was inserted into the hysteroscope and the polyp was resected under direct visualization. Samples of all four walls of the uterus were also taken under direct visualization for endometrial sampling. The ostia were visualized bilaterally. At the completion of the procedure the cavity was intact with no injuries identified. The hysteroscope was then removed. The tissue was sent pathology for permanent evaluation. The single tooth tenaculum was removed from the cervix and hemostasis was observed from this site as well as from the os. All instruments were removed from the vagina. All sponge and instrument counts were reported to be correct. The patient tolerated the procedure well and was taken to the recovery room awake and in stable condition.    Dr. Kunz was scrubbed and present for the entire procedure.    Jaylene Renee MD

## 2025-01-02 NOTE — PERIOPERATIVE NURSING NOTE
Pt. D/c'd from Stage 2 to home as per Dr. Kunz's orders. VSS. Pt. Voided with no problem. Small amount of bleeding in toilet noted.

## 2025-01-02 NOTE — H&P
Gynecology History and Physical    HPI     Patient is a 59 y.o. female who presents for D&C hysteroscopy, possible polypectomy/myomectomy due to PMB and thickened EMS. Office EMB path benign, but given 13 mm stripe with cystic change suggested more diagnostic sampling which she is agreeable to.   OB History:   OB History    Para Term  AB Living   1 1 1 0 0 1   SAB IAB Ectopic Multiple Live Births   0 0 0 0 1      # Outcome Date GA Lbr Brian/2nd Weight Sex Type Anes PTL Lv   1 Term      CS-LTranv   SILVESTRE     Medical History:   Past Medical History:   Diagnosis Date    GERD (gastroesophageal reflux disease)     Hyperlipidemia     Hypertension     Myocardial infarction (CMS/HCC)     Pap smear for cervical cancer screening 2023    neg/neg    Peripheral vascular disease (CMS/[facility])     Right iliac claudication.  Aortogram demonstrated near occlusion of the right common iliac.  Successful right iliac stent 2017    PMB (postmenopausal bleeding)        Surgical History:   Past Surgical History   Procedure Laterality Date    Cardiac catheterization      LAD tortuous, no significant disease.  Dominant RCA-patent.  90% proximal cx, 95% mid cx and distal thrombus    Carpal tunnel release       section  2003    Coronary stent placement  2017    NERI ×2 left circumflex-performed in [place]    Iliac artery stent  10/2017    Dr. Ugalde    IVUS - coronary N/A 3/30/2021    Performed by Ashkan Cerrato III, [place] at [facility] [place]    LEFT HEART CATH WITH CORONARY ANGIOGRAPHY N/A 3/30/2021    Performed by Ashkan Cerrato III, MD at [facility] [place] C[place]/EP    Stent NERI coronary - initial vessel N/A 3/30/2021    Performed by Ashkan Cerrato III, MD at [facility] [place] CATH/EP    [place] tooth extraction         Social History:   Social History     Social History Narrative     one child     She is a supervisor for adult probation        Family History:   Family History   Problem  Relation Name Age of Onset    Diabetes type II Maternal Grandmother      Heart disease Biological Father Christopher     Hyperlipidemia Biological Father Christopher     Diabetes Biological Father Christopher     Breast cancer Biological Mother Nati         postmenopausal    Lung cancer Biological Mother Nati     Cervical cancer Neg Hx      Uterine cancer Neg Hx         Allergies: Adhesive and Sulfa (sulfonamide antibiotics)    Prior to Admission medications    Medication Sig Start Date End Date Taking? Authorizing Provider   aspirin 81 mg enteric coated tablet Take 81 mg by mouth daily. 8/23/17   Erik Bass MD   atorvastatin (LIPITOR) 80 mg tablet Take 1 tablet (80 mg total) by mouth once daily. 11/13/23   Zara Sharma MD   empagliflozin (JARDIANCE) 25 mg tablet Take 1 tablet (25 mg total) by mouth daily. 10/13/23   Davian Fisher DO   ferrous sulfate 134 mg (27 mg iron) tablet Take 1 tablet by mouth daily with breakfast.   4/24/18   Erik Bass MD   glipiZIDE (GLUCOTROL XL) 5 mg 24 hr tablet Take 1 tablet (5 mg total) by mouth daily. 7/22/24   Nora Gudino DO   lisinopriL (PRINIVIL) 10 mg tablet Take 1 tablet (10 mg total) by mouth once daily. 11/13/23   Zara Sharma MD   metFORMIN XR (GLUCOPHAGE-XR) 750 mg 24 hr tablet TAKE TWO TABLETS BY MOUTH DAILY 7/22/24   Nora Gudino DO   metoprolol succinate XL (TOPROL-XL) 100 mg 24 hr tablet Take 1 tablet (100 mg total) by mouth once daily. 11/13/23   Zara Sharma MD   rivaroxaban (XARELTO) 2.5 mg tablet tablet Take 1 tablet (2.5 mg total) by mouth 2 (two) times a day. 11/13/23   Zara Sharma MD   tirzepatide (MOUNJARO) 2.5 mg/0.5 mL pen injector Inject 0.5 mL (2.5 mg total) under the skin every (seven) 7 days. 10/30/24   Davian Fisher DO       Review of Systems  Pertinent items are noted in HPI.    Objective     Vital Signs for the last 24 hours:   Upon admission    Exam  General Appearance: Alert,  cooperative, no acute distress  Head: Normocephalic, without obvious abnormality  Cards: RRR  Lungs: CTA BL  Breast: Deferred  Abdomen: Soft, nontender, nondistended,no masses, no organomegaly  Pelvic exam:  deferred  Extremities: no edema    Labs  CBC Results         12/18/24 07/12/23 11/24/21     0940 1116 0843    WBC 8.73 6.2 8.5    RBC 4.29 4.19 4.36    HGB 12.5 12.7 12.6    HCT 38.6 36.7 38.0    MCV 90.0 88 87    MCH 29.1 30.3 28.9    MCHC 32.4 34.6 33.2     187 192            Assessment/Plan     58yo presenting for D&C hysteroscopy/possible polypectomy/myomectomy due to PMB and thickened EMS    vitals upon admission  No abx indicated  Anesthesia per protocol  Hx MI/HTN: s/p cardiology clearance. Plavix/ASA held pre-procedure    Gladys Kunz DO

## 2025-01-02 NOTE — H&P (VIEW-ONLY)
Gynecology History and Physical    HPI     Patient is a 59 y.o. female who presents for D&C hysteroscopy, possible polypectomy/myomectomy due to PMB and thickened EMS. Office EMB path benign, but given 13 mm stripe with cystic change suggested more diagnostic sampling which she is agreeable to.   OB History:   OB History    Para Term  AB Living   1 1 1 0 0 1   SAB IAB Ectopic Multiple Live Births   0 0 0 0 1      # Outcome Date GA Lbr Brian/2nd Weight Sex Type Anes PTL Lv   1 Term      CS-LTranv   SILVESTRE     Medical History:   Past Medical History:   Diagnosis Date    GERD (gastroesophageal reflux disease)     Hyperlipidemia     Hypertension     Myocardial infarction (CMS/HCC)     Pap smear for cervical cancer screening 2023    neg/neg    Peripheral vascular disease (CMS/[facility])     Right iliac claudication.  Aortogram demonstrated near occlusion of the right common iliac.  Successful right iliac stent 2017    PMB (postmenopausal bleeding)        Surgical History:   Past Surgical History   Procedure Laterality Date    Cardiac catheterization      LAD tortuous, no significant disease.  Dominant RCA-patent.  90% proximal cx, 95% mid cx and distal thrombus    Carpal tunnel release       section  2003    Coronary stent placement  2017    NERI ×2 left circumflex-performed in [place]    Iliac artery stent  10/2017    Dr. Ugalde    IVUS - coronary N/A 3/30/2021    Performed by Ashkan Cerrato III, [place] [facility] [place]    LEFT HEART CATH WITH CORONARY ANGIOGRAPHY N/A 3/30/2021    Performed by Ashkan Cerrato III, [place] at [facility] [place]    Stent NERI coronary - initial vessel N/A 3/30/2021    Performed by Ashkan Cerrato III, [place] at [facility] [place]    [place] tooth extraction         Social History:   Social History     Social History Narrative     one child     She is a supervisor for adult probation        Family History:   Family History   Problem  Relation Name Age of Onset    Diabetes type II Maternal Grandmother      Heart disease Biological Father Christopher     Hyperlipidemia Biological Father Christopher     Diabetes Biological Father Christopher     Breast cancer Biological Mother Nati         postmenopausal    Lung cancer Biological Mother Nati     Cervical cancer Neg Hx      Uterine cancer Neg Hx         Allergies: Adhesive and Sulfa (sulfonamide antibiotics)    Prior to Admission medications    Medication Sig Start Date End Date Taking? Authorizing Provider   aspirin 81 mg enteric coated tablet Take 81 mg by mouth daily. 8/23/17   Erik Bass MD   atorvastatin (LIPITOR) 80 mg tablet Take 1 tablet (80 mg total) by mouth once daily. 11/13/23   Zara Sharma MD   empagliflozin (JARDIANCE) 25 mg tablet Take 1 tablet (25 mg total) by mouth daily. 10/13/23   Davian Fisher DO   ferrous sulfate 134 mg (27 mg iron) tablet Take 1 tablet by mouth daily with breakfast.   4/24/18   Erik Bass MD   glipiZIDE (GLUCOTROL XL) 5 mg 24 hr tablet Take 1 tablet (5 mg total) by mouth daily. 7/22/24   Nora Gudino DO   lisinopriL (PRINIVIL) 10 mg tablet Take 1 tablet (10 mg total) by mouth once daily. 11/13/23   Zara Sharma MD   metFORMIN XR (GLUCOPHAGE-XR) 750 mg 24 hr tablet TAKE TWO TABLETS BY MOUTH DAILY 7/22/24   Nora Gudino DO   metoprolol succinate XL (TOPROL-XL) 100 mg 24 hr tablet Take 1 tablet (100 mg total) by mouth once daily. 11/13/23   Zara Sharma MD   rivaroxaban (XARELTO) 2.5 mg tablet tablet Take 1 tablet (2.5 mg total) by mouth 2 (two) times a day. 11/13/23   Zara Sharma MD   tirzepatide (MOUNJARO) 2.5 mg/0.5 mL pen injector Inject 0.5 mL (2.5 mg total) under the skin every (seven) 7 days. 10/30/24   Davian Fisher DO       Review of Systems  Pertinent items are noted in HPI.    Objective     Vital Signs for the last 24 hours:   Upon admission    Exam  General Appearance: Alert,  cooperative, no acute distress  Head: Normocephalic, without obvious abnormality  Cards: RRR  Lungs: CTA BL  Breast: Deferred  Abdomen: Soft, nontender, nondistended,no masses, no organomegaly  Pelvic exam:  deferred  Extremities: no edema    Labs  CBC Results         12/18/24 07/12/23 11/24/21     0940 1116 0843    WBC 8.73 6.2 8.5    RBC 4.29 4.19 4.36    HGB 12.5 12.7 12.6    HCT 38.6 36.7 38.0    MCV 90.0 88 87    MCH 29.1 30.3 28.9    MCHC 32.4 34.6 33.2     187 192            Assessment/Plan     58yo presenting for D&C hysteroscopy/possible polypectomy/myomectomy due to PMB and thickened EMS    vitals upon admission  No abx indicated  Anesthesia per protocol  Hx MI/HTN: s/p cardiology clearance. Plavix/ASA held pre-procedure    Gladys Kunz DO

## 2025-01-03 ENCOUNTER — TELEPHONE (OUTPATIENT)
Dept: OBSTETRICS AND GYNECOLOGY | Facility: CLINIC | Age: 60
End: 2025-01-03
Payer: COMMERCIAL

## 2025-01-03 LAB
CASE RPRT: NORMAL
CLINICAL INFO: NORMAL
PATH REPORT.FINAL DX SPEC: NORMAL
PATH REPORT.GROSS SPEC: NORMAL

## 2025-01-03 NOTE — ANESTHESIA POSTPROCEDURE EVALUATION
Patient: Mirela Lopez    Procedure Summary       Date: 01/02/25 Room / Location: [facility]    Anesthesia Start: 1203 Anesthesia Stop: 1252    Procedure: D&C Hysteroscopy and polypectomy/truclear (Uterus) Diagnosis:       Thickened endometrium      Endometrial polyp      (Thickened endometrium)      (Endometrial polyp)    Surgeons: Gladys Kunz DO Responsible Provider: Ann Guan MD    Anesthesia Type: MAC ASA Status: 3            Anesthesia Type: MAC  PACU Vitals  1/2/2025 1246 - 1/2/2025 1346        1/2/2025  1249 1/2/2025  1300 1/2/2025  1315 1/2/2025  1330    BP: 133/70 150/72 140/70 140/63    Temp: 36.3 °C (97.4 °F) -- -- --    Pulse: 76 71 59 54    Resp: 20 13 10 10    SpO2: 100 % 100 % 100 % 98 %                1/2/2025  1345             BP: 151/70       Temp: --       Pulse: 63       Resp: 10       SpO2: 96 %                 Anesthesia Post Evaluation    Pain management: adequate  Mode of pain management: IV medication  Patient location during evaluation: PACU  Patient participation: complete - patient participated  Level of consciousness: awake and alert  Cardiovascular status: acceptable  Airway Patency: adequate  Respiratory status: acceptable  Hydration status: acceptable  Anesthetic complications: no

## 2025-01-03 NOTE — TELEPHONE ENCOUNTER
Spoke with mirela this morning, doing well no issues post D&C yesterday. Path benign polyp, no carcinoma. Mirela relieved to hear this. I will see her in August for her annual and she will call sooner with any concerns.

## 2025-01-06 DIAGNOSIS — E11.9 TYPE 2 DIABETES MELLITUS WITHOUT COMPLICATION, WITHOUT LONG-TERM CURRENT USE OF INSULIN (CMS/HCC): ICD-10-CM

## 2025-01-06 RX ORDER — TIRZEPATIDE 2.5 MG/.5ML
INJECTION, SOLUTION SUBCUTANEOUS
Qty: 2 ML | Refills: 0 | Status: SHIPPED | OUTPATIENT
Start: 2025-01-06 | End: 2025-02-02 | Stop reason: SDUPTHER

## 2025-02-02 DIAGNOSIS — E11.9 TYPE 2 DIABETES MELLITUS WITHOUT COMPLICATION, WITHOUT LONG-TERM CURRENT USE OF INSULIN (CMS/HCC): ICD-10-CM

## 2025-02-02 RX ORDER — TIRZEPATIDE 2.5 MG/.5ML
2.5 INJECTION, SOLUTION SUBCUTANEOUS
Qty: 2 ML | Refills: 0 | Status: SHIPPED | OUTPATIENT
Start: 2025-02-02

## 2025-03-03 ENCOUNTER — OFFICE VISIT (OUTPATIENT)
Dept: PRIMARY CARE | Facility: CLINIC | Age: 60
End: 2025-03-03
Payer: COMMERCIAL

## 2025-03-03 VITALS
HEIGHT: 64 IN | WEIGHT: 225.8 LBS | OXYGEN SATURATION: 97 % | HEART RATE: 89 BPM | BODY MASS INDEX: 38.55 KG/M2 | TEMPERATURE: 97.6 F | DIASTOLIC BLOOD PRESSURE: 72 MMHG | SYSTOLIC BLOOD PRESSURE: 148 MMHG | RESPIRATION RATE: 18 BRPM

## 2025-03-03 DIAGNOSIS — D76.3 ROSAI-DORFMAN DISEASE (CMS/HCC): ICD-10-CM

## 2025-03-03 DIAGNOSIS — E11.9 TYPE 2 DIABETES MELLITUS WITHOUT COMPLICATION, WITHOUT LONG-TERM CURRENT USE OF INSULIN (CMS/HCC): Primary | ICD-10-CM

## 2025-03-03 DIAGNOSIS — I10 ESSENTIAL HYPERTENSION: ICD-10-CM

## 2025-03-03 DIAGNOSIS — E61.1 IRON DEFICIENCY: ICD-10-CM

## 2025-03-03 DIAGNOSIS — Z00.00 HEALTHCARE MAINTENANCE: ICD-10-CM

## 2025-03-03 DIAGNOSIS — E78.00 PURE HYPERCHOLESTEROLEMIA: ICD-10-CM

## 2025-03-03 PROCEDURE — 3078F DIAST BP <80 MM HG: CPT | Performed by: STUDENT IN AN ORGANIZED HEALTH CARE EDUCATION/TRAINING PROGRAM

## 2025-03-03 PROCEDURE — 99214 OFFICE O/P EST MOD 30 MIN: CPT | Performed by: STUDENT IN AN ORGANIZED HEALTH CARE EDUCATION/TRAINING PROGRAM

## 2025-03-03 PROCEDURE — 3008F BODY MASS INDEX DOCD: CPT | Performed by: STUDENT IN AN ORGANIZED HEALTH CARE EDUCATION/TRAINING PROGRAM

## 2025-03-03 PROCEDURE — 3077F SYST BP >= 140 MM HG: CPT | Performed by: STUDENT IN AN ORGANIZED HEALTH CARE EDUCATION/TRAINING PROGRAM

## 2025-03-03 RX ORDER — GLIPIZIDE 5 MG/1
5 TABLET, FILM COATED, EXTENDED RELEASE ORAL DAILY
Qty: 90 TABLET | Refills: 1 | Status: SHIPPED | OUTPATIENT
Start: 2025-03-03

## 2025-03-03 RX ORDER — METFORMIN HYDROCHLORIDE 750 MG/1
TABLET, EXTENDED RELEASE ORAL
Qty: 180 TABLET | Refills: 1 | Status: SHIPPED | OUTPATIENT
Start: 2025-03-03

## 2025-03-03 NOTE — ASSESSMENT & PLAN NOTE
Check labs  Continue empagliflozin 25 mg daily, glipizide 5 mg daily, metformin 750 mg twice daily  Restart mounjaro pending baseline A1c  Continue statin and lisinopril  Orders:    Hemoglobin A1c; Future    Microalbumin/Creatinine Ur Random; Future    glipiZIDE (GLUCOTROL XL) 5 mg 24 hr tablet; Take 1 tablet (5 mg total) by mouth daily.    metFORMIN XR (GLUCOPHAGE-XR) 750 mg 24 hr tablet; TAKE TWO TABLETS BY MOUTH DAILY    empagliflozin (JARDIANCE) 25 mg tablet; Take 1 tablet (25 mg total) by mouth daily.

## 2025-03-03 NOTE — PROGRESS NOTES
"Subjective      Patient ID: Mirela Lopez is a 59 y.o. female.  1965      HPI    Patient presents for transfer of care visit.  Has a PMH of type II diabetes, essential hypertension, hypercholesterolemia, iron deficiency, Rosai Ileana disease, CAD.  Follows with cardiology.  Due for labs.  Discontinued mounjaro - interested in restarting.  Diet: improved. Trying to cut out carbs.  Exercise: started chair yoga and has a treadmill  Home BP log: none  Home sugar log: none    The following have been reviewed and updated as appropriate in this visit:   Tobacco  Allergies  Meds  Problems  Med Hx  Surg Hx  Fam Hx       Review of Systems reviewed and as noted in the HPI.    Objective     Vitals:    03/03/25 0917   BP: (!) 148/72   BP Location: Left upper arm   Patient Position: Sitting   Pulse: 89   Resp: 18   Temp: 36.4 °C (97.6 °F)   TempSrc: Temporal   SpO2: 97%   Weight: 102 kg (225 lb 12.8 oz)   Height: 1.626 m (5' 4\")     Body mass index is 38.76 kg/m².    Physical Exam  Vitals reviewed.   HENT:      Head: Normocephalic and atraumatic.   Eyes:      Extraocular Movements: Extraocular movements intact.   Cardiovascular:      Rate and Rhythm: Normal rate and regular rhythm.   Pulmonary:      Effort: Pulmonary effort is normal.      Breath sounds: Normal breath sounds.   Neurological:      General: No focal deficit present.      Mental Status: She is alert and oriented to person, place, and time.   Psychiatric:         Mood and Affect: Mood normal.         Assessment & Plan  Type 2 diabetes mellitus without complication, without long-term current use of insulin (CMS/[facility])  Check labs  Continue empagliflozin 25 mg daily, glipizide 5 mg daily, metformin 750 mg twice daily  Restart mounjaro pending baseline A1c  Continue statin and lisinopril  Orders:    Hemoglobin A1c; Future    Microalbumin/Creatinine Ur Random; Future    glipiZIDE (GLUCOTROL XL) 5 mg 24 hr tablet; Take 1 tablet (5 mg total) by mouth daily.    " metFORMIN XR (GLUCOPHAGE-XR) 750 mg 24 hr tablet; TAKE TWO TABLETS BY MOUTH DAILY    empagliflozin (JARDIANCE) 25 mg tablet; Take 1 tablet (25 mg total) by mouth daily.    Pure hypercholesterolemia  Continue atorvastatin 80 mg daily  Orders:    Lipid panel; Future    Essential hypertension  Continue lisinopril 10 mg daily and metoprolol succinate 100 mg daily       Iron deficiency  Continue p.o. iron supplementation  Orders:    Iron and TIBC; Future    Ferritin; Future    Rosai-Ileana disease (CMS/HCC)  Stable       Healthcare maintenance    Orders:    Comprehensive metabolic panel; Future    CBC; Future        Kathi Ricketts MD  3/3/2025

## 2025-03-04 PROBLEM — E11.51 DIABETES MELLITUS WITH PERIPHERAL VASCULAR DISEASE (CMS/HCC): Status: ACTIVE | Noted: 2021-02-22

## 2025-03-13 ENCOUNTER — DOCUMENTATION (OUTPATIENT)
Dept: PRIMARY CARE | Facility: CLINIC | Age: 60
End: 2025-03-13
Payer: COMMERCIAL

## 2025-03-13 NOTE — PROGRESS NOTES
Coding Clarification ([facility]) - Formerly [facility]  Note       DATE: 3/13/2025    RE: Mirela Lopez  : 1965      Under the direction of Kathi Ricketts MD, the following adjustments were made to this patients Problem List:      Specified Code: E11.51 Code Description: Type 2 diabetes mellitus with diabetic peripheral angiopathy without gangrene    Clarification Type EMR System Encounter Type Date of Service Provider   Code Specificity Epic Progress note  2024 [facility]    Rationale: Code identified in claims, without supporting clinical information.  [facility] submitted code E11.51 on DOS 2024. Higher specificity for E11.9 from Problem List is available to be E11.51        Original Code: E11.9

## 2025-05-06 LAB — MLHC DIABETIC EYE EXAM (EXTERNAL): NORMAL

## 2025-05-27 ENCOUNTER — APPOINTMENT (OUTPATIENT)
Dept: URBAN - METROPOLITAN AREA CLINIC 23 | Facility: CLINIC | Age: 60
Setting detail: DERMATOLOGY
End: 2025-05-27

## 2025-05-27 DIAGNOSIS — B07.8 OTHER VIRAL WARTS: ICD-10-CM

## 2025-05-27 DIAGNOSIS — L82.0 INFLAMED SEBORRHEIC KERATOSIS: ICD-10-CM

## 2025-05-27 DIAGNOSIS — L30.4 ERYTHEMA INTERTRIGO: ICD-10-CM | Status: RESOLVED

## 2025-05-27 PROCEDURE — ? PRESCRIPTION

## 2025-05-27 PROCEDURE — 99203 OFFICE O/P NEW LOW 30 MIN: CPT | Mod: 25

## 2025-05-27 PROCEDURE — ? PRESCRIPTION MEDICATION MANAGEMENT

## 2025-05-27 PROCEDURE — ? COUNSELING

## 2025-05-27 PROCEDURE — ? LIQUID NITROGEN

## 2025-05-27 PROCEDURE — 17110 DESTRUCTION B9 LES UP TO 14: CPT

## 2025-05-27 RX ORDER — KETOCONAZOLE 20 MG/G
CREAM TOPICAL
Qty: 60 | Refills: 3 | Status: ERX | COMMUNITY
Start: 2025-05-27

## 2025-05-27 RX ADMIN — KETOCONAZOLE: 20 CREAM TOPICAL at 00:00

## 2025-05-27 ASSESSMENT — LOCATION DETAILED DESCRIPTION DERM
LOCATION DETAILED: RIGHT AXILLARY VAULT
LOCATION DETAILED: LEFT DISTAL POSTERIOR THIGH
LOCATION DETAILED: LEFT AXILLARY VAULT
LOCATION DETAILED: LEFT INFRAMAMMARY CREASE (INNER QUADRANT)
LOCATION DETAILED: SUPERIOR LUMBAR SPINE
LOCATION DETAILED: INFERIOR THORACIC SPINE

## 2025-05-27 ASSESSMENT — LOCATION ZONE DERM
LOCATION ZONE: LEG
LOCATION ZONE: TRUNK
LOCATION ZONE: AXILLAE

## 2025-05-27 ASSESSMENT — LOCATION SIMPLE DESCRIPTION DERM
LOCATION SIMPLE: LEFT POSTERIOR THIGH
LOCATION SIMPLE: LOWER BACK
LOCATION SIMPLE: RIGHT AXILLARY VAULT
LOCATION SIMPLE: UPPER BACK
LOCATION SIMPLE: LEFT BREAST
LOCATION SIMPLE: LEFT AXILLARY VAULT

## 2025-05-27 NOTE — HPI: RASH
What Type Of Note Output Would You Prefer (Optional)?: Standard Output
Is This A New Presentation, Or A Follow-Up?: Rash
Additional History: Pt went to urgent care and was prescribed clotrimazole-betamethasone cream, which cleared up the rash.

## 2025-05-27 NOTE — PROCEDURE: LIQUID NITROGEN
Consent: The patient's consent was obtained including but not limited to risks of crusting, scabbing, blistering, scarring, darker or lighter pigmentary change, recurrence, incomplete removal and infection.
Detail Level: Simple
Render Note In Bullet Format When Appropriate: No
Spray Paint Text: The liquid nitrogen was applied to the skin utilizing a spray paint frosting technique.
Medical Necessity Clause: This procedure was medically necessary because the lesions that were treated were:
Show Applicator Variable?: Yes
Medical Necessity Information: It is in your best interest to select a reason for this procedure from the list below. All of these items fulfill various CMS LCD requirements except the new and changing color options.
Number Of Freeze-Thaw Cycles: 3 freeze-thaw cycles
Post-Care Instructions: I reviewed with the patient in detail post-care instructions. Patient is to wear sunprotection, and avoid picking at any of the treated lesions. Pt may apply Vaseline to crusted or scabbing areas.
Duration Of Freeze Thaw-Cycle (Seconds): 3

## 2025-05-27 NOTE — PROCEDURE: PRESCRIPTION MEDICATION MANAGEMENT
Discontinue Regimen: clotrimazole-betamethasone cream (prescribed by urgent care)
Detail Level: Simple
Initiate Treatment: PRN Flares - ketoconazole 2 % topical cream: Apply to affected areas 2 times daily 1-2 weeks until clear. May repeat PRN flares.
Render In Strict Bullet Format?: No

## 2025-06-24 ENCOUNTER — TELEPHONE (OUTPATIENT)
Dept: VASCULAR SURGERY | Facility: CLINIC | Age: 60
End: 2025-06-24
Payer: COMMERCIAL

## 2025-06-25 ENCOUNTER — HOSPITAL ENCOUNTER (OUTPATIENT)
Dept: CARDIOLOGY | Facility: CLINIC | Age: 60
Discharge: HOME | End: 2025-06-25
Attending: SURGERY
Payer: COMMERCIAL

## 2025-06-25 VITALS — HEIGHT: 64 IN | BODY MASS INDEX: 38.41 KG/M2 | WEIGHT: 225 LBS

## 2025-06-25 DIAGNOSIS — I73.9 PERIPHERAL VASCULAR DISEASE (CMS/HCC): ICD-10-CM

## 2025-06-25 PROCEDURE — 93923 UPR/LXTR ART STDY 3+ LVLS: CPT | Performed by: SURGERY

## 2025-06-27 LAB
BSA FOR ECHO PROCEDURE: 2.15 M2
LEFT 1ST TOE INDEX: 1
LEFT 1ST TOE: 118 MMHG
LEFT ABI: 1.1
LEFT ARM BP: 118 MMHG
LEFT DORSALIS PEDIS INDEX: 0.92
LEFT DORSALIS PEDIS: 108 MMHG
LEFT LOW THIGH INDEX: 1.14
LEFT LOW THIGH: 135 MMHG
LEFT POST TIBIAL INDEX: 1.1
LEFT POSTERIOR TIBIAL: 130 MMHG
LEFT PROXIMAL CALF INDEX: 1.12
LEFT PROXIMAL CALF: 132 MMHG
LEFT TBI: 1
RIGHT 1ST TOE INDEX: 0.7
RIGHT 1ST TOE: 83 MMHG
RIGHT ABI: 1.22
RIGHT ARM BP: 112 MMHG
RIGHT DORSALIS PEDIS INDEX: 1.03
RIGHT DORSALIS PEDIS: 121 MMHG
RIGHT LOW THIGH INDEX: 1.34
RIGHT LOW THIGH: 158 MMHG
RIGHT POST TIBIAL INDEX: 1.22
RIGHT POSTERIOR TIBIAL: 144 MMHG
RIGHT PROXIMAL CALF INDEX: 1.31
RIGHT PROXIMAL CALF: 154 MMHG
RIGHT TBI: 0.7

## 2025-06-30 ENCOUNTER — OFFICE VISIT (OUTPATIENT)
Dept: CARDIOLOGY | Facility: CLINIC | Age: 60
End: 2025-06-30
Payer: COMMERCIAL

## 2025-06-30 VITALS
BODY MASS INDEX: 38.76 KG/M2 | HEART RATE: 91 BPM | HEIGHT: 64 IN | RESPIRATION RATE: 16 BRPM | WEIGHT: 227 LBS | DIASTOLIC BLOOD PRESSURE: 64 MMHG | SYSTOLIC BLOOD PRESSURE: 126 MMHG

## 2025-06-30 DIAGNOSIS — I10 ESSENTIAL HYPERTENSION: ICD-10-CM

## 2025-06-30 DIAGNOSIS — E66.813 CLASS 3 SEVERE OBESITY DUE TO EXCESS CALORIES WITH SERIOUS COMORBIDITY AND BODY MASS INDEX (BMI) OF 40.0 TO 44.9 IN ADULT: ICD-10-CM

## 2025-06-30 DIAGNOSIS — I25.10 CAD IN NATIVE ARTERY: Primary | ICD-10-CM

## 2025-06-30 DIAGNOSIS — R94.31 ABNORMAL EKG: ICD-10-CM

## 2025-06-30 DIAGNOSIS — E11.9 TYPE 2 DIABETES MELLITUS WITHOUT COMPLICATION, WITHOUT LONG-TERM CURRENT USE OF INSULIN (CMS/HCC): ICD-10-CM

## 2025-06-30 DIAGNOSIS — I73.9 CLAUDICATION (CMS/HCC): ICD-10-CM

## 2025-06-30 DIAGNOSIS — I73.9 PERIPHERAL VASCULAR DISEASE (CMS/HCC): ICD-10-CM

## 2025-06-30 LAB
ATRIAL RATE: 91
P AXIS: 44
PR INTERVAL: 154
QRS DURATION: 82
QT INTERVAL: 364
QTC CALCULATION(BAZETT): 447
R AXIS: -18
T WAVE AXIS: 56
VENTRICULAR RATE: 91

## 2025-06-30 PROCEDURE — 93000 ELECTROCARDIOGRAM COMPLETE: CPT | Performed by: INTERNAL MEDICINE

## 2025-06-30 PROCEDURE — 99214 OFFICE O/P EST MOD 30 MIN: CPT | Performed by: INTERNAL MEDICINE

## 2025-06-30 PROCEDURE — 3008F BODY MASS INDEX DOCD: CPT | Performed by: INTERNAL MEDICINE

## 2025-06-30 PROCEDURE — 3078F DIAST BP <80 MM HG: CPT | Performed by: INTERNAL MEDICINE

## 2025-06-30 PROCEDURE — 3074F SYST BP LT 130 MM HG: CPT | Performed by: INTERNAL MEDICINE

## 2025-06-30 NOTE — PROGRESS NOTES
"     Cardiology Office Visit     Patient ID: Mirela Lopez 60 y.o. female 1965  PCP: Kathi Ricketts MD   History Present Illness     Mirela Lopez presents to the office today for follow-up regarding coronary artery disease and previous percutaneous intervention and a 90% LAD lesion in March 2021.  She also had prior circumflex intervention for acute coronary syndrome modification in [place] in 2017.  She has a history of peripheral vascular disease and previous \"kissing iliac stents\" but is stable from that perspective.  She denies any claudication.  She is enjoying longterm, but did develop some dysfunctional uterine bleeding and had a biopsy which was negative.  She had a D&C performed without any cardiac issue.    We reviewed the findings of her recent cardiac PET scan which did not demonstrate any significant ischemia.  Despite her known coronary disease and diabetes, she had normal myocardial blood flow at rest and normal flow reserve.  There is no evidence of any ischemia but she does have a small inferoapical scar.  On review of systems, she has no new anginal symptoms.  No exertional chest pain, increasing dyspnea, PND or orthopnea.    She and her  are looking forward to a [employer] cruise shortly.    Allergies/Medications   Allergies:Adhesive and Sulfa (sulfonamide antibiotics)    Medications:    aspirin, Take 81 mg by mouth daily.    atorvastatin, Take 1 tablet (80 mg total) by mouth once daily.    empagliflozin, Take 1 tablet (25 mg total) by mouth daily.    ferrous sulfate, Take 1 tablet by mouth daily with breakfast.      glipiZIDE, Take 1 tablet (5 mg total) by mouth daily.    lisinopriL, Take 1 tablet (10 mg total) by mouth once daily.    metFORMIN XR, TAKE TWO TABLETS BY MOUTH DAILY    metoprolol succinate XL, Take 1 tablet (100 mg total) by mouth once daily.    rivaroxaban, Take 1 tablet (2.5 mg total) by mouth 2 (two) times a day.     Physical Exam     Vitals:    06/30/25 0923 " "  BP: 126/64   BP Location: Left upper arm   Patient Position: Sitting   Pulse: 91   Resp: 16   Weight: 103 kg (227 lb)   Height: 1.626 m (5' 4.02\")     BP Readings from Last 3 Encounters:   06/30/25 126/64   03/03/25 (!) 148/72   01/02/25 131/65     Wt Readings from Last 3 Encounters:   06/30/25 103 kg (227 lb)   06/25/25 102 kg (225 lb)   03/03/25 102 kg (225 lb 12.8 oz)      Physical Exam:  Constitutional: Obese white female. No apparent distress.   Eyes: No icterus  ENT:  Mucosa moist  Neck: Supple, no JVD or hepatojugular reflux.  No bruits.  Cardiac: Normal S1 and S2, regular rate and rhythm, no S3.  No rub.  No ectopy.  There is a 1/6 systolic murmur at the left sternal border.   Lungs: Clear to auscultation bilaterally.  No wheezing.  Abdomen: Soft, nontender, positive normoactive bowel sounds.  No bruit.  Extremities: No clubbing or cyanosis.  No calf tenderness.  No edema.  Distal pulses are intact but diminished bilaterally.   Skin: Warm and dry.  No jaundice.  Musculoskeletal: No joint swelling or erythema.  Neurologic: Awake, alert, oriented.  Moving all extremities.  Psychiatric: No agitation.    Labs/Imaging/Procedures   Labs  Lab Results   Component Value Date    WBC 8.73 12/18/2024    HGB 12.5 12/18/2024     12/18/2024    ALT 37 (H) 07/12/2023    AST 36 07/12/2023     12/18/2024    K 4.0 12/18/2024     12/18/2024    CREATININE 0.5 (L) 12/18/2024    BUN 10 12/18/2024    CO2 29 12/18/2024    TSH 1.540 07/12/2023    INR 1.0 03/25/2021    HGBA1C 8.3 (H) 07/12/2023     Lab Results   Component Value Date    CHOL 151 07/12/2023    CHOL 115 11/24/2021    HDL 43 07/12/2023    HDL 35 (L) 11/24/2021    TRIG 162 (H) 07/12/2023    TRIG 130 11/24/2021     Lab Results   Component Value Date    LDLCALC 80 07/12/2023    LDLCALC 57 11/24/2021     Imaging  CV nuclear cardiac PET/CT myocardial perfusion scan 10/3/2024: There is a moderate-sized fixed inferoapical and apical defect with no significant " reversibility.  Findings consistent with scar.  Resting LVEF 53%, increasing to 61% with stress.  Normal wall motion.  Normal resting myocardial blood flow, normal stress myocardial blood flow and normal flow reserve.  No acute EKG changes or arrhythmia with stress.    Lower extremity JOURDAN 6/26/2024: Right JOURDAN 1.2, left JOURDAN 0.97.    TTE 7/5/2023:  Technically difficult study.  Normal LV size with mild concentric LVH.  LVEF approximately 65%.  Endocardium is difficult to visualize and wall motion cannot be adequately assessed.  No obvious wall motion abnormalities are noted.  Abnormal septal motion consistent with conduction abnormality.  Normal RV size and function.  Trace TR.  Jet insufficient to calculate RVSP.  Normal left atrial size.  Diastolic filling pattern acceptable for age.  The aortic valve is difficult to visualize but appears trileaflet and opens well.  No aortic stenosis or regurgitation.  Aortic root and ascending aorta are sclerotic.  Sclerotic mitral valve leaflets with mild MAC.  Trace mitral regurgitation.  IVC is not well-visualized.  Trivial pericardial effusion.     PVR 6/28/2023: The right resting JOURDAN is normal.  The left resting JOURDAN is normal.     PVR 6/22/22: Resting JOURDAN within normal range-1.13 on the right and 0.9 on the left.  PVR is normal on the left and nondiagnostic on the right.     PVR lower extremity 6/23/2021: Normal ABIs bilaterally at rest.  History of kissing iliac stents.       Cardiac catheterization 3/30/2021: Discrete 90% stenosis in the mid LAD followed by tubular 70% stenosis.  Prior left circumflex stents patent.  Patient had successful NERI of LAD lesions.       Echo 3/10/2021:  Technically difficult study secondary to body habitus. Overall normal LV size with mild concentric LVH.  LVEF 65-70%.  No obvious regional wall motion abnormalities but endocardium difficult to visualize.  Impaired relaxation. The RV  is difficult to visualize in subcostal views are of poor  quality.  The RV is grossly normal in size and function in the apical views. · Trileaflet aortic valve.  No aortic stenosis.  Sclerotic mitral valve leaflets with trace MR.  No significant pericardial effusion.     Exercise MPI 3/10/2021:  Abnormal exercise nuclear stress test.  Reversible defects are noted in the lateral wall, apex, and anterior wall suggestive of ischemia.  No prior study for comparison.  LVEF 67%.  No transient ischemic dilatation.  EKG portion is negative for arrhythmias or ischemic changes.  Patient exercised on Abner protocol for 3 minutes 4.6 METS.  Poor exercise tolerance.  No chest pain reported.  Peak blood pressure 144/88 mmHg.  Patient achieved 92% of predicted maximal heart rate.     LE arterial duplex bilateral 7/22/2020: Triphasic waveforms throughout the lower extremity with at least two-vessel runoff to the foot.  The left lower extremity has triphasic waveforms with at least two-vessel runoff.  There is a probable stenosis at the origin of the profunda.  No significant change.       LE arterial duplex bilateral 7/17/2019: Triphasic and biphasic waveforms throughout the right and left lower extremities.  No evidence of discrete stenosis or obstruction.       Exercise stress echocardiogram 4/19/2019: Very technically difficult study.  Exercise EKG negative for ischemia or arrhythmia. Poor exercise capacity completing only stage I Abner protocol.  Exercise EKG negative for ischemia or arrhythmia at that workload.  At rest, normal LV size and function with LVEF 60-65%.  Basal mid inferior hypokinesis.  Mildly dilated RV with preserved function.  Trace TR.  Trace MR.  Trileaflet aortic valve.  No significant stenosis or insufficiency.  Prominent pericardial fat pad.  Immediately following exercise overall LV function improves.  Persistent basal inferior hypokinesis consistent with scar. No ischemia.     PVR 1/9/2019: Right JOURDAN 1.12 at rest and 1.1 following exercise.  Left JOURDAN 0.95 at  rest and 0.96 following exercise.  Normal segmental pressures and PVRs.       Periph Intervention 10/2017: Successful right iliac stent.     Abd Ao Duplex 9/2017: Elevated velocities right MICHAEL with monophasic flow.     Echo 9/2017: LVEF 65%, Top normal LV size with overall preserved function. No significant pulmonary hypertension by Doppler. Technically difficult study.     Cath 8/13/17: LAD tortuous, no significant disease. RCA dominant and patent. Left circumflex with 90% proximal lesion, 95% mid lesion and distal thrombus. Successful NERI ×2 left circumflex. Occluded right MICHAEL at origin with collaterals. Patent left MICHAEL and CFA    EKG:     Assessment/Plan     1. CAD in native artery    2. Peripheral vascular disease (CMS/HCC)    3. Type 2 diabetes mellitus without complication, without long-term current use of insulin (CMS/[facility])    4. Class 3 severe obesity due to excess calories with serious comorbidity and body mass index (BMI) of 40.0 to 44.9 in adult    5. Abnormal EKG    6. Claudication (CMS/HCC)    7. Essential hypertension       Mirela is doing well.  She has no evidence of volume overload, but does have aggressive coronary disease for a woman in her 50s.  We reviewed the results of her PET scan and I was very pleased to see that she had no evidence of ischemia and had normal flow reserve.  LV function remains preserved.  She has no new anginal symptoms.  Her peripheral vascular disease has been stable with acceptable JOURDAN and no new claudication.  We discussed her most recent imaging.  Unfortunately her vascular surgery appointment had to be deferred due to surgical emergency.    Her EKG has no acute change.  Blood pressure remains well-controlled on metoprolol 100 mg once daily and lisinopril 10 mg once daily.    She is on Jardiance for her diabetes and Mounjaro is on hold after she stopped it for surgery.  She is awaiting new blood work and will be getting this done shortly and then follow-up with you  regarding further management.      Return in about 9 months (around 3/30/2026) for follow-up, earlier if any change in symptoms.  Available medical records reviewed and updated including laboratory data, imaging studies, previous outpatient and inpatient records.  Counseling/education performed. Care everywhere utilized where appropriate.    Thank you for allowing me to participate in the care of this patient.  I hope this information is helpful.      Zara Mccloud MD, [facility], FASE  6/30/2025  11:07 AM  This document was generated utilizing voice recognition technology. Please excuse any typographical errors which may be present.

## 2025-06-30 NOTE — LETTER
"June 30, 2025     Kathi Ricketts MD  [address] [facility] [address]    Patient: Mirela Lopez  YOB: 1965  Date of Visit: 6/30/2025      Dear Dr. Ricketts:    Thank you for referring Mirela Lopez to me for evaluation. Below are my notes for this consultation.    If you have questions, please do not hesitate to call me. I look forward to following your patient along with you.         Sincerely,        Zara Mccloud MD        CC: MD Gladys Naik, Zara Masters MD  6/30/2025 11:09 AM  Sign when Signing Visit       Cardiology Office Visit     Patient ID: Mirela Lopez 60 y.o. female 1965  PCP: Kathi Ricketts MD   History Present Illness     Mirela Lopez presents to the office today for follow-up regarding coronary artery disease and previous percutaneous intervention and a 90% LAD lesion in March 2021.  She also had prior circumflex intervention for acute coronary syndrome modification in [place] in 2017.  She has a history of peripheral vascular disease and previous \"kissing iliac stents\" but is stable from that perspective.  She denies any claudication.  She is enjoying [facility], but did develop some dysfunctional uterine bleeding and had a biopsy which was negative.  She had a D&C performed without any cardiac issue.    We reviewed the findings of her recent cardiac PET scan which did not demonstrate any significant ischemia.  Despite her known coronary disease and diabetes, she had normal myocardial blood flow at rest and normal flow reserve.  There is no evidence of any ischemia but she does have a small inferoapical scar.  On review of systems, she has no new anginal symptoms.  No exertional chest pain, increasing dyspnea, PND or orthopnea.    She and her  are looking forward to a [place] shortly.    Allergies/Medications   Allergies:Adhesive and Sulfa (sulfonamide antibiotics)    Medications:  •  aspirin, Take 81 mg by " "mouth daily.  •  atorvastatin, Take 1 tablet (80 mg total) by mouth once daily.  •  empagliflozin, Take 1 tablet (25 mg total) by mouth daily.  •  ferrous sulfate, Take 1 tablet by mouth daily with breakfast.    •  glipiZIDE, Take 1 tablet (5 mg total) by mouth daily.  •  lisinopriL, Take 1 tablet (10 mg total) by mouth once daily.  •  metFORMIN XR, TAKE TWO TABLETS BY MOUTH DAILY  •  metoprolol succinate XL, Take 1 tablet (100 mg total) by mouth once daily.  •  rivaroxaban, Take 1 tablet (2.5 mg total) by mouth 2 (two) times a day.     Physical Exam     Vitals:    06/30/25 0923   BP: 126/64   BP Location: Left upper arm   Patient Position: Sitting   Pulse: 91   Resp: 16   Weight: 103 kg (227 lb)   Height: 1.626 m (5' 4.02\")     BP Readings from Last 3 Encounters:   06/30/25 126/64   03/03/25 (!) 148/72   01/02/25 131/65     Wt Readings from Last 3 Encounters:   06/30/25 103 kg (227 lb)   06/25/25 102 kg (225 lb)   03/03/25 102 kg (225 lb 12.8 oz)      Physical Exam:  Constitutional: Obese white female. No apparent distress.   Eyes: No icterus  ENT:  Mucosa moist  Neck: Supple, no JVD or hepatojugular reflux.  No bruits.  Cardiac: Normal S1 and S2, regular rate and rhythm, no S3.  No rub.  No ectopy.  There is a 1/6 systolic murmur at the left sternal border.   Lungs: Clear to auscultation bilaterally.  No wheezing.  Abdomen: Soft, nontender, positive normoactive bowel sounds.  No bruit.  Extremities: No clubbing or cyanosis.  No calf tenderness.  No edema.  Distal pulses are intact but diminished bilaterally.   Skin: Warm and dry.  No jaundice.  Musculoskeletal: No joint swelling or erythema.  Neurologic: Awake, alert, oriented.  Moving all extremities.  Psychiatric: No agitation.    Labs/Imaging/Procedures   Labs  Lab Results   Component Value Date    WBC 8.73 12/18/2024    HGB 12.5 12/18/2024     12/18/2024    ALT 37 (H) 07/12/2023    AST 36 07/12/2023     12/18/2024    K 4.0 12/18/2024     " 12/18/2024    CREATININE 0.5 (L) 12/18/2024    BUN 10 12/18/2024    CO2 29 12/18/2024    TSH 1.540 07/12/2023    INR 1.0 03/25/2021    HGBA1C 8.3 (H) 07/12/2023     Lab Results   Component Value Date    CHOL 151 07/12/2023    CHOL 115 11/24/2021    HDL 43 07/12/2023    HDL 35 (L) 11/24/2021    TRIG 162 (H) 07/12/2023    TRIG 130 11/24/2021     Lab Results   Component Value Date    LDLCALC 80 07/12/2023    LDLCALC 57 11/24/2021     Imaging  CV nuclear cardiac PET/CT myocardial perfusion scan 10/3/2024: There is a moderate-sized fixed inferoapical and apical defect with no significant reversibility.  Findings consistent with scar.  Resting LVEF 53%, increasing to 61% with stress.  Normal wall motion.  Normal resting myocardial blood flow, normal stress myocardial blood flow and normal flow reserve.  No acute EKG changes or arrhythmia with stress.    Lower extremity JOURDAN 6/26/2024: Right JOURDAN 1.2, left JOURDAN 0.97.    TTE 7/5/2023:  Technically difficult study.  Normal LV size with mild concentric LVH.  LVEF approximately 65%.  Endocardium is difficult to visualize and wall motion cannot be adequately assessed.  No obvious wall motion abnormalities are noted.  Abnormal septal motion consistent with conduction abnormality.  Normal RV size and function.  Trace TR.  Jet insufficient to calculate RVSP.  Normal left atrial size.  Diastolic filling pattern acceptable for age.  The aortic valve is difficult to visualize but appears trileaflet and opens well.  No aortic stenosis or regurgitation.  Aortic root and ascending aorta are sclerotic.  Sclerotic mitral valve leaflets with mild MAC.  Trace mitral regurgitation.  IVC is not well-visualized.  Trivial pericardial effusion.     PVR 6/28/2023: The right resting JOURDAN is normal.  The left resting JOURDAN is normal.     PVR 6/22/22: Resting JOURDAN within normal range-1.13 on the right and 0.9 on the left.  PVR is normal on the left and nondiagnostic on the right.     PVR lower extremity  6/23/2021: Normal ABIs bilaterally at rest.  History of kissing iliac stents.       Cardiac catheterization 3/30/2021: Discrete 90% stenosis in the mid LAD followed by tubular 70% stenosis.  Prior left circumflex stents patent.  Patient had successful NERI of LAD lesions.       Echo 3/10/2021:  Technically difficult study secondary to body habitus. Overall normal LV size with mild concentric LVH.  LVEF 65-70%.  No obvious regional wall motion abnormalities but endocardium difficult to visualize.  Impaired relaxation. The RV  is difficult to visualize in subcostal views are of poor quality.  The RV is grossly normal in size and function in the apical views. · Trileaflet aortic valve.  No aortic stenosis.  Sclerotic mitral valve leaflets with trace MR.  No significant pericardial effusion.     Exercise MPI 3/10/2021:  Abnormal exercise nuclear stress test.  Reversible defects are noted in the lateral wall, apex, and anterior wall suggestive of ischemia.  No prior study for comparison.  LVEF 67%.  No transient ischemic dilatation.  EKG portion is negative for arrhythmias or ischemic changes.  Patient exercised on Abner protocol for 3 minutes 4.6 METS.  Poor exercise tolerance.  No chest pain reported.  Peak blood pressure 144/88 mmHg.  Patient achieved 92% of predicted maximal heart rate.     LE arterial duplex bilateral 7/22/2020: Triphasic waveforms throughout the lower extremity with at least two-vessel runoff to the foot.  The left lower extremity has triphasic waveforms with at least two-vessel runoff.  There is a probable stenosis at the origin of the profunda.  No significant change.       LE arterial duplex bilateral 7/17/2019: Triphasic and biphasic waveforms throughout the right and left lower extremities.  No evidence of discrete stenosis or obstruction.       Exercise stress echocardiogram 4/19/2019: Very technically difficult study.  Exercise EKG negative for ischemia or arrhythmia. Poor exercise capacity  completing only stage I Abner protocol.  Exercise EKG negative for ischemia or arrhythmia at that workload.  At rest, normal LV size and function with LVEF 60-65%.  Basal mid inferior hypokinesis.  Mildly dilated RV with preserved function.  Trace TR.  Trace MR.  Trileaflet aortic valve.  No significant stenosis or insufficiency.  Prominent pericardial fat pad.  Immediately following exercise overall LV function improves.  Persistent basal inferior hypokinesis consistent with scar. No ischemia.     PVR 1/9/2019: Right JOURDAN 1.12 at rest and 1.1 following exercise.  Left JOURDAN 0.95 at rest and 0.96 following exercise.  Normal segmental pressures and PVRs.       Periph Intervention 10/2017: Successful right iliac stent.     Abd Ao Duplex 9/2017: Elevated velocities right MICHAEL with monophasic flow.     Echo 9/2017: LVEF 65%, Top normal LV size with overall preserved function. No significant pulmonary hypertension by Doppler. Technically difficult study.     Cath 8/13/17: LAD tortuous, no significant disease. RCA dominant and patent. Left circumflex with 90% proximal lesion, 95% mid lesion and distal thrombus. Successful NERI ×2 left circumflex. Occluded right MICHAEL at origin with collaterals. Patent left MICHAEL and CFA    EKG:     Assessment/Plan     1. CAD in native artery    2. Peripheral vascular disease (CMS/HCC)    3. Type 2 diabetes mellitus without complication, without long-term current use of insulin (CMS/HCC)    4. Class 3 severe obesity due to excess calories with serious comorbidity and body mass index (BMI) of 40.0 to 44.9 in adult    5. Abnormal EKG    6. Claudication (CMS/HCC)    7. Essential hypertension       Mirela is doing well.  She has no evidence of volume overload, but does have aggressive coronary disease for a woman in her 50s.  We reviewed the results of her PET scan and I was very pleased to see that she had no evidence of ischemia and had normal flow reserve.  LV function remains preserved.  She has no  new anginal symptoms.  Her peripheral vascular disease has been stable with acceptable JOURDAN and no new claudication.  We discussed her most recent imaging.  Unfortunately her vascular surgery appointment had to be deferred due to surgical emergency.    Her EKG has no acute change.  Blood pressure remains well-controlled on metoprolol 100 mg once daily and lisinopril 10 mg once daily.    She is on Jardiance for her diabetes and Mounjaro is on hold after she stopped it for surgery.  She is awaiting new blood work and will be getting this done shortly and then follow-up with you regarding further management.      Return in about 9 months (around 3/30/2026) for follow-up, earlier if any change in symptoms.  Available medical records reviewed and updated including laboratory data, imaging studies, previous outpatient and inpatient records.  Counseling/education performed. Care everywhere utilized where appropriate.    Thank you for allowing me to participate in the care of this patient.  I hope this information is helpful.      Zara Mccloud MD, [facility], FASE  6/30/2025  11:07 AM  This document was generated utilizing voice recognition technology. Please excuse any typographical errors which may be present.

## 2025-07-03 ENCOUNTER — TELEPHONE (OUTPATIENT)
Dept: OBSTETRICS AND GYNECOLOGY | Facility: CLINIC | Age: 60
End: 2025-07-03
Payer: COMMERCIAL

## 2025-07-03 NOTE — TELEPHONE ENCOUNTER
Provider:  Ze Stiles  Appointment Type:  annual  Reason for Visit: annual  Active Symptoms    Onset of Symptoms    Available Day and Time:  last annual 7/9/24  Best Contact Number: [phone]

## 2025-08-13 ENCOUNTER — OFFICE VISIT (OUTPATIENT)
Dept: OBSTETRICS AND GYNECOLOGY | Facility: CLINIC | Age: 60
End: 2025-08-13
Payer: COMMERCIAL

## 2025-08-13 VITALS
WEIGHT: 221.38 LBS | DIASTOLIC BLOOD PRESSURE: 70 MMHG | BODY MASS INDEX: 37.98 KG/M2 | SYSTOLIC BLOOD PRESSURE: 120 MMHG

## 2025-08-13 DIAGNOSIS — N89.8 VAGINAL PRURITUS: Primary | ICD-10-CM

## 2025-08-13 DIAGNOSIS — Z12.31 ENCOUNTER FOR SCREENING MAMMOGRAM FOR MALIGNANT NEOPLASM OF BREAST: ICD-10-CM

## 2025-08-13 PROCEDURE — S0612 ANNUAL GYNECOLOGICAL EXAMINA: HCPCS

## 2025-08-13 RX ORDER — CLOTRIMAZOLE AND BETAMETHASONE DIPROPIONATE 10; .64 MG/G; MG/G
CREAM TOPICAL 2 TIMES DAILY
Qty: 45 G | Refills: 1 | Status: SHIPPED | OUTPATIENT
Start: 2025-08-13 | End: 2025-09-12

## 2025-08-16 LAB
A VAGINAE DNA VAG QL NAA+PROBE: ABNORMAL SCORE
BVAB2 DNA VAG QL NAA+PROBE: ABNORMAL SCORE
C ALBICANS DNA VAG QL NAA+PROBE: POSITIVE
C GLABRATA DNA VAG QL NAA+PROBE: NEGATIVE
C TRACH DNA SPEC QL NAA+PROBE: NEGATIVE
MEGA1 DNA VAG QL NAA+PROBE: ABNORMAL SCORE
N GONORRHOEA DNA VAG QL NAA+PROBE: NEGATIVE
T VAGINALIS DNA VAG QL NAA+PROBE: NEGATIVE

## 2025-09-02 ENCOUNTER — TELEPHONE (OUTPATIENT)
Dept: PRIMARY CARE | Facility: CLINIC | Age: 60
End: 2025-09-02
Payer: COMMERCIAL

## (undated) DEVICE — DRESSING TEGADERM 4X4 3/4

## (undated) DEVICE — GLOVE SZ 7 LINER PROTEXIS PI BL

## (undated) DEVICE — SOLN IRRIG .9%SOD 3L

## (undated) DEVICE — SOLN IV 0.9% NSS 1000ML

## (undated) DEVICE — TOWEL SURGICAL W17XL27IN BLUE COTTON STANDARD PREWASHED DELI

## (undated) DEVICE — DOLPHIN COLLECTION KIT

## (undated) DEVICE — TRAY WET SKIN PREP PREMIUM

## (undated) DEVICE — CATH BALLOON MINI TREK RX 2.00X20MM

## (undated) DEVICE — GOWN SIRUS FABRNF RAGLAN XL ST 28/CS

## (undated) DEVICE — CATH D 6F FR4 100CM

## (undated) DEVICE — KIT CATH LAB ANGIO

## (undated) DEVICE — ADAPTOR SUCTION SPECIMEN SOCK

## (undated) DEVICE — TR BAND REGULAR

## (undated) DEVICE — SOLN BETADINE 4 OZ

## (undated) DEVICE — COVER LIGHTHANDLE STERILE BLUE

## (undated) DEVICE — CATH INTROCAN SAFETY FEP 20G X 1IN

## (undated) DEVICE — GUIDEWIRE DIAGNOSTIC 035-260 EXCHANGE

## (undated) DEVICE — CATH EAGLE EYE PLATINUM

## (undated) DEVICE — DEVICE TRUCLEAR INCISOR 2.9

## (undated) DEVICE — DEVICE TRUCLEAR ULTRA RECIPROCATING

## (undated) DEVICE — CATH BALLOON NC QUANTUM APEX MR 3.00MM X 12MM

## (undated) DEVICE — GUIDEWIRE DIAGNOSTIC J .035. 150 (ORDER IN 5'S)

## (undated) DEVICE — DRAPE APERTURE 16X16

## (undated) DEVICE — GLOVE SZ 6.5 PROTEXIS PI

## (undated) DEVICE — GAUZE 8X4 16 PLY RFID DOUBLE XRAY

## (undated) DEVICE — DEVICE TRUCLEAR ROTARY

## (undated) DEVICE — TUBE SUCTION 1/4INX20FT STERILE

## (undated) DEVICE — GUIDEWIRE BMW UNIVERSAL 190CM "J"

## (undated) DEVICE — CATH 6FR FL3.5

## (undated) DEVICE — GLOVE SZ 7.5 PROTEXIS CLASSIC LATEX

## (undated) DEVICE — KIT HYSTEROSCOPIC PROCEDURE (TRUCLEAR)

## (undated) DEVICE — CATH GUIDE ADROIT 6FR .072 XB3 100CM

## (undated) DEVICE — Device

## (undated) DEVICE — CANISTER SUCTION 3000CC BEMIS

## (undated) DEVICE — GLIDESHEATH SLENDER SS (.021) 6FR 10CM

## (undated) DEVICE — CAP SEAL HYSTEROSCOPE